# Patient Record
Sex: MALE | Race: BLACK OR AFRICAN AMERICAN | NOT HISPANIC OR LATINO | Employment: FULL TIME | URBAN - METROPOLITAN AREA
[De-identification: names, ages, dates, MRNs, and addresses within clinical notes are randomized per-mention and may not be internally consistent; named-entity substitution may affect disease eponyms.]

---

## 2017-05-22 ENCOUNTER — PATIENT OUTREACH (OUTPATIENT)
Dept: ADMINISTRATIVE | Facility: HOSPITAL | Age: 52
End: 2017-05-22

## 2017-05-22 NOTE — PROGRESS NOTES
Called both numbers listed for patient and cell phone does not have voice mail set up.  The second phone number rang and rang did not go to voice mail.    Sent a letter per mail for patient to call back and schedule appointment for labs, office visit, and update health maintenance.

## 2017-08-31 ENCOUNTER — PATIENT MESSAGE (OUTPATIENT)
Dept: FAMILY MEDICINE | Facility: CLINIC | Age: 52
End: 2017-08-31

## 2017-09-09 ENCOUNTER — LAB VISIT (OUTPATIENT)
Dept: LAB | Facility: HOSPITAL | Age: 52
End: 2017-09-09
Payer: COMMERCIAL

## 2017-09-09 LAB
ESTIMATED AVG GLUCOSE: 226 MG/DL
HBA1C MFR BLD HPLC: 9.5 %

## 2017-09-09 PROCEDURE — 83036 HEMOGLOBIN GLYCOSYLATED A1C: CPT

## 2017-09-09 PROCEDURE — 36415 COLL VENOUS BLD VENIPUNCTURE: CPT | Mod: PO

## 2017-09-11 ENCOUNTER — PATIENT MESSAGE (OUTPATIENT)
Dept: FAMILY MEDICINE | Facility: CLINIC | Age: 52
End: 2017-09-11

## 2017-09-18 DIAGNOSIS — N52.9 ERECTILE DYSFUNCTION, UNSPECIFIED ERECTILE DYSFUNCTION TYPE: Primary | ICD-10-CM

## 2017-09-18 RX ORDER — VARDENAFIL HYDROCHLORIDE 20 MG/1
TABLET ORAL
Qty: 10 TABLET | Refills: 12 | Status: SHIPPED | OUTPATIENT
Start: 2017-09-18 | End: 2021-02-05

## 2017-09-18 RX ORDER — METFORMIN HYDROCHLORIDE 1000 MG/1
TABLET ORAL
Qty: 60 TABLET | Refills: 5 | Status: SHIPPED | OUTPATIENT
Start: 2017-09-18 | End: 2018-08-17 | Stop reason: SDUPTHER

## 2017-10-20 ENCOUNTER — TELEPHONE (OUTPATIENT)
Dept: ENDOCRINOLOGY | Facility: CLINIC | Age: 52
End: 2017-10-20

## 2017-12-12 ENCOUNTER — TELEPHONE (OUTPATIENT)
Dept: ENDOCRINOLOGY | Facility: CLINIC | Age: 52
End: 2017-12-12

## 2018-07-30 ENCOUNTER — PATIENT MESSAGE (OUTPATIENT)
Dept: FAMILY MEDICINE | Facility: CLINIC | Age: 53
End: 2018-07-30

## 2018-07-30 DIAGNOSIS — Z00.00 ROUTINE MEDICAL EXAM: Primary | ICD-10-CM

## 2018-07-30 NOTE — TELEPHONE ENCOUNTER
very overdue for labs, definitely needs to get labs done before the appointment or reschedule the appointment.      Labs will be fasting and urine needed also

## 2018-08-02 ENCOUNTER — LAB VISIT (OUTPATIENT)
Dept: LAB | Facility: HOSPITAL | Age: 53
End: 2018-08-02
Attending: INTERNAL MEDICINE
Payer: COMMERCIAL

## 2018-08-02 ENCOUNTER — PATIENT MESSAGE (OUTPATIENT)
Dept: FAMILY MEDICINE | Facility: CLINIC | Age: 53
End: 2018-08-02

## 2018-08-02 DIAGNOSIS — Z00.00 ROUTINE MEDICAL EXAM: ICD-10-CM

## 2018-08-02 LAB
ALBUMIN SERPL BCP-MCNC: 3.9 G/DL
ALP SERPL-CCNC: 66 U/L
ALT SERPL W/O P-5'-P-CCNC: 29 U/L
ANION GAP SERPL CALC-SCNC: 7 MMOL/L
AST SERPL-CCNC: 24 U/L
BASOPHILS # BLD AUTO: 0.03 K/UL
BASOPHILS NFR BLD: 0.5 %
BILIRUB SERPL-MCNC: 1.3 MG/DL
BUN SERPL-MCNC: 11 MG/DL
CALCIUM SERPL-MCNC: 9.8 MG/DL
CHLORIDE SERPL-SCNC: 98 MMOL/L
CHOLEST SERPL-MCNC: 197 MG/DL
CHOLEST/HDLC SERPL: 5.1 {RATIO}
CO2 SERPL-SCNC: 26 MMOL/L
COMPLEXED PSA SERPL-MCNC: 1.8 NG/ML
CREAT SERPL-MCNC: 1.2 MG/DL
DIFFERENTIAL METHOD: NORMAL
EOSINOPHIL # BLD AUTO: 0.1 K/UL
EOSINOPHIL NFR BLD: 2.1 %
ERYTHROCYTE [DISTWIDTH] IN BLOOD BY AUTOMATED COUNT: 12.2 %
EST. GFR  (AFRICAN AMERICAN): >60 ML/MIN/1.73 M^2
EST. GFR  (NON AFRICAN AMERICAN): >60 ML/MIN/1.73 M^2
ESTIMATED AVG GLUCOSE: 255 MG/DL
GLUCOSE SERPL-MCNC: 206 MG/DL
HBA1C MFR BLD HPLC: 10.5 %
HCT VFR BLD AUTO: 46.3 %
HDLC SERPL-MCNC: 39 MG/DL
HDLC SERPL: 19.8 %
HGB BLD-MCNC: 15.7 G/DL
IMM GRANULOCYTES # BLD AUTO: 0.03 K/UL
IMM GRANULOCYTES NFR BLD AUTO: 0.5 %
LDLC SERPL CALC-MCNC: 125.8 MG/DL
LYMPHOCYTES # BLD AUTO: 1.7 K/UL
LYMPHOCYTES NFR BLD: 29.3 %
MCH RBC QN AUTO: 30.6 PG
MCHC RBC AUTO-ENTMCNC: 33.9 G/DL
MCV RBC AUTO: 90 FL
MONOCYTES # BLD AUTO: 0.7 K/UL
MONOCYTES NFR BLD: 12.8 %
NEUTROPHILS # BLD AUTO: 3.1 K/UL
NEUTROPHILS NFR BLD: 54.8 %
NONHDLC SERPL-MCNC: 158 MG/DL
NRBC BLD-RTO: 0 /100 WBC
PLATELET # BLD AUTO: 209 K/UL
PMV BLD AUTO: 10 FL
POTASSIUM SERPL-SCNC: 4.9 MMOL/L
PROT SERPL-MCNC: 7.3 G/DL
RBC # BLD AUTO: 5.13 M/UL
SODIUM SERPL-SCNC: 131 MMOL/L
TRIGL SERPL-MCNC: 161 MG/DL
TSH SERPL DL<=0.005 MIU/L-ACNC: 1.07 UIU/ML
WBC # BLD AUTO: 5.63 K/UL

## 2018-08-02 PROCEDURE — 85025 COMPLETE CBC W/AUTO DIFF WBC: CPT

## 2018-08-02 PROCEDURE — 84443 ASSAY THYROID STIM HORMONE: CPT

## 2018-08-02 PROCEDURE — 84153 ASSAY OF PSA TOTAL: CPT

## 2018-08-02 PROCEDURE — 83036 HEMOGLOBIN GLYCOSYLATED A1C: CPT

## 2018-08-02 PROCEDURE — 36415 COLL VENOUS BLD VENIPUNCTURE: CPT | Mod: PO

## 2018-08-02 PROCEDURE — 80053 COMPREHEN METABOLIC PANEL: CPT

## 2018-08-02 PROCEDURE — 80061 LIPID PANEL: CPT

## 2018-08-17 ENCOUNTER — OFFICE VISIT (OUTPATIENT)
Dept: FAMILY MEDICINE | Facility: CLINIC | Age: 53
End: 2018-08-17
Payer: COMMERCIAL

## 2018-08-17 VITALS
WEIGHT: 240.75 LBS | OXYGEN SATURATION: 99 % | HEIGHT: 74 IN | SYSTOLIC BLOOD PRESSURE: 130 MMHG | BODY MASS INDEX: 30.9 KG/M2 | HEART RATE: 85 BPM | DIASTOLIC BLOOD PRESSURE: 88 MMHG | TEMPERATURE: 98 F

## 2018-08-17 DIAGNOSIS — Z12.5 SCREENING FOR PROSTATE CANCER: ICD-10-CM

## 2018-08-17 DIAGNOSIS — Z86.010 HISTORY OF COLONIC POLYPS: ICD-10-CM

## 2018-08-17 DIAGNOSIS — E78.5 HYPERLIPIDEMIA, UNSPECIFIED HYPERLIPIDEMIA TYPE: ICD-10-CM

## 2018-08-17 DIAGNOSIS — Z91.199 POOR COMPLIANCE: ICD-10-CM

## 2018-08-17 DIAGNOSIS — Z00.00 ROUTINE MEDICAL EXAM: Primary | ICD-10-CM

## 2018-08-17 DIAGNOSIS — E78.6 HDL LIPOPROTEIN DEFICIENCY: ICD-10-CM

## 2018-08-17 PROBLEM — Z86.0100 HISTORY OF COLONIC POLYPS: Status: ACTIVE | Noted: 2018-08-17

## 2018-08-17 PROCEDURE — 99396 PREV VISIT EST AGE 40-64: CPT | Mod: S$GLB,,, | Performed by: INTERNAL MEDICINE

## 2018-08-17 PROCEDURE — 99999 PR PBB SHADOW E&M-EST. PATIENT-LVL IV: CPT | Mod: PBBFAC,,, | Performed by: INTERNAL MEDICINE

## 2018-08-17 PROCEDURE — 3046F HEMOGLOBIN A1C LEVEL >9.0%: CPT | Mod: CPTII,S$GLB,, | Performed by: INTERNAL MEDICINE

## 2018-08-17 RX ORDER — GLIMEPIRIDE 4 MG/1
4 TABLET ORAL 2 TIMES DAILY
Qty: 60 TABLET | Refills: 3 | Status: SHIPPED | OUTPATIENT
Start: 2018-08-17 | End: 2019-08-19 | Stop reason: SDUPTHER

## 2018-08-17 RX ORDER — METFORMIN HYDROCHLORIDE 1000 MG/1
TABLET ORAL
Qty: 60 TABLET | Refills: 3 | Status: SHIPPED | OUTPATIENT
Start: 2018-08-17 | End: 2019-06-27 | Stop reason: SDUPTHER

## 2018-08-17 RX ORDER — PRAVASTATIN SODIUM 40 MG/1
40 TABLET ORAL DAILY
Qty: 90 TABLET | Refills: 6 | Status: SHIPPED | OUTPATIENT
Start: 2018-08-17 | End: 2019-06-27 | Stop reason: SDUPTHER

## 2018-08-17 NOTE — PROGRESS NOTES
Chief complaint: Follow-up on lab are    52-year-old black male . Last seen 2016.  PSA normal. MULTIPLE and large polyps 8/16 -3 yrs      poorly compliant with follow-up.  He is actually not on Amaryl and Pravachol.  Does not appear to have a triglyceride problem so we will not restart the again febrile.  We will restart his Amaryl the discussion of potential low sugars.  He has already seen the dietitian and does not feel he needs a refresher.  He does state today he is often he is going to get a the big frozen drink and plan out to him he really needs to check some postprandial is a make some significant adjustments.  We discussed at great length all the complications of uncontrolled diabetes including blindness, kidney damage, vascular disease.  We will set him up with Optometry.  Discussed the possible need for insulin another more expensive medicines.  Encouraged him I gave him written instruction to follow up in mid November and only gave him 3 months refills    ROS:   CONST: weight stable. EYES: no vision change. ENT: no sore throat. CV: no chest pain w/ exertion. RESP: no shortness of breath. GI: no nausea, vomiting, diarrhea. No dysphagia. : no urinary issues. MUSCULOSKELETAL: no new myalgias or arthralgias. SKIN: no new changes. NEURO: no focal deficits. PSYCH: no new issues. ENDOCRINE: no polyuria. HEME: no lymph nodes. ALLERGY: no general pruritis.      PAST MEDICAL HISTORY:                                                        1.  Diabetes.    Uncontrolled  , Possible neuropathy                                                          2.  Hyperlipidemia.  Lopid added 5/12  , Pravachol added 11/12                                                      3.  Low HDL.    4.  Erectile dysfunction   5.  Poor compliance   6.  epicondylitis     7.  Hoarseness -seen by ENT- did inhaler     8.  Superficial thrombosis and varicose veins right lower leg, seeing vascular       9.  MULTIPLE and large polyps 8/16 -3  "yrs                                                                                                                               PAST SURGICAL HISTORY:  None.                                                                                                                             FAMILY HISTORY:  Father doing well with history of colon polyps.  Mom is      with history of breast cancer.  Three brothers and two sisters      with no known medical problems.                                                                                                                           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  Quit smoking in  and only had a brief      period of smoking.  He reports "moderate" amount of alcohol with no          frequency stated.     Few beers now and then     Gen: no distress  EYES: conjunctiva clear, non-icteric, PERRL  ENT: nose clear, nasal mucosa normal, oropharynx clear and moist, teeth good  NECK:supple, thyroid non-palpable  RESP: effort is good, lungs clear  CV: heart RRR w/o murmur, gallops or rubs; no carotid bruits, no edema  GI: abdomen soft, non-distended, non-tender, no hepatosplenomegaly  MS: gait normal, no clubbing or cyanosis of the digits  SKIN: no rashes, warm to touch    Labs reviewed    Abel was seen today for diabetes.    Diagnoses and all orders for this visit:    Routine medical exam, up-to-date on appropriate health maintenance    Screening for prostate cancer    Diabetes mellitus with neurological manifestations, uncontrolled, discussed as above  -     glimepiride (AMARYL) 4 MG tablet; Take 1 tablet (4 mg total) by mouth 2 (two) times daily.  -     metFORMIN (GLUCOPHAGE) 1000 MG tablet; 1 Tablet Oral Twice a day  -     Ambulatory referral to Optometry    HDL lipoprotein deficiency    Hyperlipidemia, unspecified hyperlipidemia type, restart pravastatin    Poor compliance    History of " "colonic polyps, discussed the significance of his large polyps    Other orders  -     Cancel: Ambulatory referral to Optometry  -     Cancel: Ambulatory referral to Podiatry  -     pravastatin (PRAVACHOL) 40 MG tablet; Take 1 tablet (40 mg total) by mouth once daily.     "This note will not be shared with the patient."  "

## 2018-11-28 LAB
LEFT EYE DM RETINOPATHY: NEGATIVE
RIGHT EYE DM RETINOPATHY: NEGATIVE

## 2019-02-15 DIAGNOSIS — E11.9 TYPE 2 DIABETES MELLITUS WITHOUT COMPLICATION: ICD-10-CM

## 2019-06-09 ENCOUNTER — PATIENT MESSAGE (OUTPATIENT)
Dept: FAMILY MEDICINE | Facility: CLINIC | Age: 54
End: 2019-06-09

## 2019-06-11 ENCOUNTER — PATIENT MESSAGE (OUTPATIENT)
Dept: FAMILY MEDICINE | Facility: CLINIC | Age: 54
End: 2019-06-11

## 2019-06-11 DIAGNOSIS — E11.65 UNCONTROLLED TYPE 2 DIABETES MELLITUS WITH HYPERGLYCEMIA: Primary | ICD-10-CM

## 2019-06-12 ENCOUNTER — PATIENT MESSAGE (OUTPATIENT)
Dept: FAMILY MEDICINE | Facility: CLINIC | Age: 54
End: 2019-06-12

## 2019-06-13 ENCOUNTER — LAB VISIT (OUTPATIENT)
Dept: LAB | Facility: HOSPITAL | Age: 54
End: 2019-06-13
Attending: INTERNAL MEDICINE
Payer: COMMERCIAL

## 2019-06-13 DIAGNOSIS — E11.9 TYPE 2 DIABETES MELLITUS WITHOUT COMPLICATION: ICD-10-CM

## 2019-06-13 DIAGNOSIS — E11.65 UNCONTROLLED TYPE 2 DIABETES MELLITUS WITH HYPERGLYCEMIA: ICD-10-CM

## 2019-06-13 LAB
ALBUMIN SERPL BCP-MCNC: 3.8 G/DL (ref 3.5–5.2)
ALP SERPL-CCNC: 65 U/L (ref 55–135)
ALT SERPL W/O P-5'-P-CCNC: 34 U/L (ref 10–44)
ANION GAP SERPL CALC-SCNC: 11 MMOL/L (ref 8–16)
AST SERPL-CCNC: 29 U/L (ref 10–40)
BILIRUB SERPL-MCNC: 0.8 MG/DL (ref 0.1–1)
BUN SERPL-MCNC: 7 MG/DL (ref 6–20)
CALCIUM SERPL-MCNC: 9.4 MG/DL (ref 8.7–10.5)
CHLORIDE SERPL-SCNC: 101 MMOL/L (ref 95–110)
CHOLEST SERPL-MCNC: 212 MG/DL (ref 120–199)
CHOLEST/HDLC SERPL: 4.9 {RATIO} (ref 2–5)
CO2 SERPL-SCNC: 21 MMOL/L (ref 23–29)
CREAT SERPL-MCNC: 1.1 MG/DL (ref 0.5–1.4)
ERYTHROCYTE [SEDIMENTATION RATE] IN BLOOD BY WESTERGREN METHOD: <2 MM/HR (ref 0–23)
EST. GFR  (AFRICAN AMERICAN): >60 ML/MIN/1.73 M^2
EST. GFR  (NON AFRICAN AMERICAN): >60 ML/MIN/1.73 M^2
ESTIMATED AVG GLUCOSE: 263 MG/DL (ref 68–131)
ESTIMATED AVG GLUCOSE: 263 MG/DL (ref 68–131)
GLUCOSE SERPL-MCNC: 223 MG/DL (ref 70–110)
HBA1C MFR BLD HPLC: 10.8 % (ref 4–5.6)
HBA1C MFR BLD HPLC: 10.8 % (ref 4–5.6)
HDLC SERPL-MCNC: 43 MG/DL (ref 40–75)
HDLC SERPL: 20.3 % (ref 20–50)
LDLC SERPL CALC-MCNC: 136.6 MG/DL (ref 63–159)
NONHDLC SERPL-MCNC: 169 MG/DL
POTASSIUM SERPL-SCNC: 3.9 MMOL/L (ref 3.5–5.1)
PROT SERPL-MCNC: 6.9 G/DL (ref 6–8.4)
SODIUM SERPL-SCNC: 133 MMOL/L (ref 136–145)
TRIGL SERPL-MCNC: 162 MG/DL (ref 30–150)

## 2019-06-13 PROCEDURE — 36415 COLL VENOUS BLD VENIPUNCTURE: CPT | Mod: PO

## 2019-06-13 PROCEDURE — 80053 COMPREHEN METABOLIC PANEL: CPT

## 2019-06-13 PROCEDURE — 80061 LIPID PANEL: CPT

## 2019-06-13 PROCEDURE — 85652 RBC SED RATE AUTOMATED: CPT

## 2019-06-13 PROCEDURE — 83036 HEMOGLOBIN GLYCOSYLATED A1C: CPT

## 2019-06-27 RX ORDER — PRAVASTATIN SODIUM 40 MG/1
40 TABLET ORAL DAILY
Qty: 90 TABLET | Refills: 12 | Status: SHIPPED | OUTPATIENT
Start: 2019-06-27 | End: 2021-02-05 | Stop reason: SDUPTHER

## 2019-06-27 RX ORDER — METFORMIN HYDROCHLORIDE 1000 MG/1
TABLET ORAL
Qty: 180 TABLET | Refills: 12 | Status: SHIPPED | OUTPATIENT
Start: 2019-06-27 | End: 2020-12-08

## 2019-07-15 ENCOUNTER — TELEPHONE (OUTPATIENT)
Dept: FAMILY MEDICINE | Facility: CLINIC | Age: 54
End: 2019-07-15

## 2019-07-30 ENCOUNTER — PATIENT MESSAGE (OUTPATIENT)
Dept: FAMILY MEDICINE | Facility: CLINIC | Age: 54
End: 2019-07-30

## 2019-07-30 DIAGNOSIS — M79.605 LEFT LEG PAIN: Primary | ICD-10-CM

## 2019-07-30 DIAGNOSIS — R29.898 BILATERAL LEG WEAKNESS: Primary | ICD-10-CM

## 2019-07-31 ENCOUNTER — PATIENT MESSAGE (OUTPATIENT)
Dept: FAMILY MEDICINE | Facility: CLINIC | Age: 54
End: 2019-07-31

## 2019-07-31 ENCOUNTER — PATIENT OUTREACH (OUTPATIENT)
Dept: ADMINISTRATIVE | Facility: HOSPITAL | Age: 54
End: 2019-07-31

## 2019-07-31 DIAGNOSIS — Z12.11 SCREEN FOR COLON CANCER: Primary | ICD-10-CM

## 2019-08-01 ENCOUNTER — PATIENT MESSAGE (OUTPATIENT)
Dept: FAMILY MEDICINE | Facility: CLINIC | Age: 54
End: 2019-08-01

## 2019-08-01 DIAGNOSIS — Z12.11 COLON CANCER SCREENING: Primary | ICD-10-CM

## 2019-08-01 NOTE — TELEPHONE ENCOUNTER
I do not see any documentation that any nurse told him that the form would be done today.  Just to note, physicians are not given time during the day to do forms and that is why the policy is patients need to have an appointment to complete forms and I will relate this to the patient, thanks

## 2019-08-01 NOTE — TELEPHONE ENCOUNTER
Spoke with pt states he is checking on status of paperwork he faxed over last night, states he spoke with nurse earlier and was told it would be finished today... Please advise

## 2019-08-06 ENCOUNTER — TELEPHONE (OUTPATIENT)
Dept: ADMINISTRATIVE | Facility: HOSPITAL | Age: 54
End: 2019-08-06

## 2019-08-12 ENCOUNTER — OFFICE VISIT (OUTPATIENT)
Dept: ORTHOPEDICS | Facility: CLINIC | Age: 54
End: 2019-08-12
Payer: COMMERCIAL

## 2019-08-12 VITALS
BODY MASS INDEX: 30.62 KG/M2 | HEART RATE: 104 BPM | DIASTOLIC BLOOD PRESSURE: 74 MMHG | WEIGHT: 238.56 LBS | HEIGHT: 74 IN | SYSTOLIC BLOOD PRESSURE: 120 MMHG

## 2019-08-12 DIAGNOSIS — E11.42 DIABETIC POLYNEUROPATHY ASSOCIATED WITH TYPE 2 DIABETES MELLITUS: Primary | ICD-10-CM

## 2019-08-12 DIAGNOSIS — M79.604 LEG PAIN, BILATERAL: Primary | ICD-10-CM

## 2019-08-12 DIAGNOSIS — M79.605 LEG PAIN, BILATERAL: Primary | ICD-10-CM

## 2019-08-12 PROCEDURE — 99999 PR PBB SHADOW E&M-EST. PATIENT-LVL III: ICD-10-PCS | Mod: PBBFAC,,, | Performed by: ORTHOPAEDIC SURGERY

## 2019-08-12 PROCEDURE — 3046F HEMOGLOBIN A1C LEVEL >9.0%: CPT | Mod: CPTII,S$GLB,, | Performed by: ORTHOPAEDIC SURGERY

## 2019-08-12 PROCEDURE — 99203 OFFICE O/P NEW LOW 30 MIN: CPT | Mod: S$GLB,,, | Performed by: ORTHOPAEDIC SURGERY

## 2019-08-12 PROCEDURE — 3008F PR BODY MASS INDEX (BMI) DOCUMENTED: ICD-10-PCS | Mod: CPTII,S$GLB,, | Performed by: ORTHOPAEDIC SURGERY

## 2019-08-12 PROCEDURE — 3046F PR MOST RECENT HEMOGLOBIN A1C LEVEL > 9.0%: ICD-10-PCS | Mod: CPTII,S$GLB,, | Performed by: ORTHOPAEDIC SURGERY

## 2019-08-12 PROCEDURE — 3008F BODY MASS INDEX DOCD: CPT | Mod: CPTII,S$GLB,, | Performed by: ORTHOPAEDIC SURGERY

## 2019-08-12 PROCEDURE — 99999 PR PBB SHADOW E&M-EST. PATIENT-LVL III: CPT | Mod: PBBFAC,,, | Performed by: ORTHOPAEDIC SURGERY

## 2019-08-12 PROCEDURE — 99203 PR OFFICE/OUTPT VISIT, NEW, LEVL III, 30-44 MIN: ICD-10-PCS | Mod: S$GLB,,, | Performed by: ORTHOPAEDIC SURGERY

## 2019-08-12 RX ORDER — GABAPENTIN 300 MG/1
300 CAPSULE ORAL 3 TIMES DAILY
Qty: 90 CAPSULE | Refills: 11 | Status: SHIPPED | OUTPATIENT
Start: 2019-08-12 | End: 2023-03-24

## 2019-08-12 NOTE — PROGRESS NOTES
"Chief Complaint   Patient presents with    Right Lower Leg - Pain    Left Lower Leg - Pain     This patient was seen in consultation at the request of Dr. Sae Stakrey     HPI: Abel Morris is a 53 y.o. male who presents today complaining of bilateral leg pain       Duration of symptoms:  6 months  Trauma or new activity: no  Pain is constant  Has a very difficult time relaying his symptoms and responds that he is "unsure" or "it is hard to describe" in response to many questions  Occasional back pain   No numbness or paresthesias  Symptoms are not consistent with radiculopathy or myelopathy  Legs "feel tight" - describes this sensation as the feeling of a vacuum sealed back   Feels that is difficult to walk after he has been on his feet for a while -- feels uncoordinated, weak, painful   Pain localized from mid shin (anterior and posterior), radiates down to the plantar aspect of bilateral feet.     This is the extent of the patient's complaints at this time.      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    ROS      Review of patient's allergies indicates:  No Known Allergies      Current Outpatient Medications:     GEMFIBROZIL ORAL, , Disp: , Rfl:     glimepiride (AMARYL) 4 MG tablet, Take 1 tablet (4 mg total) by mouth 2 (two) times daily., Disp: 60 tablet, Rfl: 3    metFORMIN (GLUCOPHAGE) 1000 MG tablet, 1 Tablet Oral Twice a day, Disp: 180 tablet, Rfl: 12    sildenafil (REVATIO) 20 mg Tab, 1-5 pills at a time per day prn, Disp: 30 tablet, Rfl: 11    vardenafil (LEVITRA) 20 MG tablet, Half to one qd prn, Disp: 10 tablet, Rfl: 12    pravastatin (PRAVACHOL) 40 MG tablet, Take 1 tablet (40 mg total) by mouth once daily., Disp: 90 tablet, Rfl: 12    Past Medical History:   Diagnosis Date    Arthritis     Diabetes mellitus type II, uncontrolled 11/28/2012    Diabetes mellitus with neurological manifestations, uncontrolled 6/13/2014    ED (erectile " dysfunction) 11/28/2012    Eye injury     os hit broken orbital bone     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    History of colonic polyps 8/17/2018    Hyperlipidemia 11/28/2012    Poor compliance 1/8/2014       Patient Active Problem List   Diagnosis    ED (erectile dysfunction)    Hyperlipidemia    Diabetes mellitus type II, uncontrolled    Poor compliance    HDL lipoprotein deficiency    Medial epicondylitis    Diabetes mellitus with neurological manifestations, uncontrolled    Overweight (BMI 25.0-29.9)    Varicose veins of lower extremities with inflammation    Blood in stool    History of colonic polyps       Past Surgical History:   Procedure Laterality Date    COLONOSCOPY N/A 8/2/2016    Per[facility] Miguel [facility]aud [facility] END[facility]       Social History     Tobacco Use    Smoking status: Never Smoker    Smokeless tobacco: Never Used   Substance Use Topics    Alcohol use: Yes     Alcohol/week: 3.6 oz     Types: 6 Cans of beer per week     Frequency: 4 or more times a week     Drinks per session: 10 or more     Binge frequency: Daily or almost daily     Comment: per week    Drug use: No       Family History   Problem Relation Age of Onset    No Known Problems Mother     Colon polyps Father 61    No Known Problems Sister     No Known Problems Brother     No Known Problems Maternal Aunt     No Known Problems Maternal Uncle     No Known Problems Paternal Aunt     No Known Problems Paternal Uncle     No Known Problems Maternal Grandmother     No Known Problems Maternal Grandfather     No Known Problems Paternal Grandmother     No Known Problems Paternal Grandfather     Amblyopia Neg Hx     Blindness Neg Hx     Cancer Neg Hx     Cataracts Neg Hx     Diabetes Neg Hx     Glaucoma Neg Hx     Hypertension Neg Hx     Macular degeneration Neg Hx     Retinal detachment Neg Hx     Strabismus Neg Hx     Stroke Neg Hx     Thyroid disease Neg Hx      Physical Exam:     Vitals:     19 0818   BP: 120/74   Pulse: 104      General: Weight: 108.2 kg (238 lb 8.6 oz) Body mass index is 30.63 kg/m².  Patient is alert, awake and oriented to time, place and person. Mood and affect are appropriate.  Patient does not appear to be in any distress, denies any constitutional symptoms and appears stated age.   HEENT: Pupils are equal and round, sclera are not injected. External examination of ears and nose reveals no abnormalities. Cranial nerves II-X are grossly intact  Skin: no rashes, abrasions or open wounds on the affected extremity   Resp: No respiratory distress or audible wheezing   CV: 2+  pulses, all extremities warm and well perfused   Bilateral Lower Extremity    5/5 L3-S1  Diminished sensation throughout bilateral feet in stockingfoot distribution  Cannot detect 5.07 monofilament over MT heads, great toe bilaterally  Dry skin over heel, plantar foot consistent in appearance with DM neuropathy  No change in sensation in leg, thigh  Non tender over leg, foot, ankle  Calf is not swollen not tender  Symmetric reflexes   No clonus  No babinski   2+ DP, BCR all toes     Imagin views bilateral tibia fibula negative     A1c greater than 9 for the last 3 years.  10.2 in 2019 and 2018    Assessment: 53 y.o. male with diabetic neuropathy of bilateral feet    I explained my diagnostic impression and the reasoning behind it in detail, using layman's terms.  Models and/or pictures were used to help in the explanation.    Plan:   - Gabapentin 300 mg TID, discussed weekly titration from QD --> BID --> TID. Wrote 1 month, can be refilled by Dr. Starkey if this is something that he would like to continue  - Referral to podiatry for diabetic foot education, exams  - Explained importance of getting and keeping DM Under control.  Discussed risks of infection, osteomyelitis and amputation.   - Nightly foot checks  - Return to clinic as needed if symptoms worsen or fail to improve.    All  questions were answered in detail. The patient is in full agreement with the treatment plan and will proceed accordingly.    A note notifying Dr. Sae Starkey of my findings was sent via the electronic medical record     This note was created by combination of typed  and M-Modal dictation. Transcription and phonetic errors may be present.  If there are any questions, please contact me.

## 2019-08-12 NOTE — LETTER
August 12, 2019      Sae Starkey MD  [address] [place] [address]           [facility]  [address], [address]  [place] [address]  Phone: [phone]          Patient: Abel Morris   MR Number: [id number]   YOB: 1965   Date of Visit: 8/12/2019       Dear Dr. Sae Starkey:    Thank you for referring Abel Morris to me for evaluation. Attached you will find relevant portions of my assessment and plan of care.    If you have questions, please do not hesitate to call me. I look forward to following Abel Morris along with you.    Sincerely,    Pam Cordero MD    Enclosure  CC:  No Recipients    If you would like to receive this communication electronically, please contact [email or web address] or [phone] to request more information on [facility] Link access.    For providers and/or their staff who would like to refer a patient to [facility], please contact us through our one-stop-shop provider referral line, [facility], at [phone].    If you feel you have received this communication in error or would no longer like to receive these types of communications, please e-mail [email or web address]

## 2019-08-14 ENCOUNTER — OFFICE VISIT (OUTPATIENT)
Dept: FAMILY MEDICINE | Facility: CLINIC | Age: 54
End: 2019-08-14
Payer: COMMERCIAL

## 2019-08-14 ENCOUNTER — LAB VISIT (OUTPATIENT)
Dept: LAB | Facility: HOSPITAL | Age: 54
End: 2019-08-14
Attending: INTERNAL MEDICINE
Payer: COMMERCIAL

## 2019-08-14 VITALS
BODY MASS INDEX: 30.33 KG/M2 | HEART RATE: 100 BPM | WEIGHT: 236.31 LBS | HEIGHT: 74 IN | OXYGEN SATURATION: 99 % | SYSTOLIC BLOOD PRESSURE: 123 MMHG | TEMPERATURE: 100 F | DIASTOLIC BLOOD PRESSURE: 80 MMHG

## 2019-08-14 DIAGNOSIS — E11.65 UNCONTROLLED TYPE 2 DIABETES MELLITUS WITH HYPERGLYCEMIA: ICD-10-CM

## 2019-08-14 DIAGNOSIS — Z86.010 HISTORY OF COLONIC POLYPS: ICD-10-CM

## 2019-08-14 DIAGNOSIS — E11.42 DIABETIC POLYNEUROPATHY ASSOCIATED WITH TYPE 2 DIABETES MELLITUS: ICD-10-CM

## 2019-08-14 DIAGNOSIS — Z91.199 POOR COMPLIANCE: ICD-10-CM

## 2019-08-14 DIAGNOSIS — E78.5 HYPERLIPIDEMIA, UNSPECIFIED HYPERLIPIDEMIA TYPE: ICD-10-CM

## 2019-08-14 DIAGNOSIS — E11.42 DIABETIC POLYNEUROPATHY ASSOCIATED WITH TYPE 2 DIABETES MELLITUS: Primary | ICD-10-CM

## 2019-08-14 DIAGNOSIS — R29.898 BILATERAL LEG WEAKNESS: ICD-10-CM

## 2019-08-14 LAB
ALBUMIN SERPL BCP-MCNC: 4.3 G/DL (ref 3.5–5.2)
ALP SERPL-CCNC: 68 U/L (ref 55–135)
ALT SERPL W/O P-5'-P-CCNC: 24 U/L (ref 10–44)
ANION GAP SERPL CALC-SCNC: 12 MMOL/L (ref 8–16)
AST SERPL-CCNC: 17 U/L (ref 10–40)
BASOPHILS # BLD AUTO: 0.03 K/UL (ref 0–0.2)
BASOPHILS NFR BLD: 0.7 % (ref 0–1.9)
BILIRUB SERPL-MCNC: 0.4 MG/DL (ref 0.1–1)
BUN SERPL-MCNC: 15 MG/DL (ref 6–20)
CALCIUM SERPL-MCNC: 10.1 MG/DL (ref 8.7–10.5)
CHLORIDE SERPL-SCNC: 98 MMOL/L (ref 95–110)
CO2 SERPL-SCNC: 24 MMOL/L (ref 23–29)
COMPLEXED PSA SERPL-MCNC: 1.5 NG/ML (ref 0–4)
CREAT SERPL-MCNC: 1.4 MG/DL (ref 0.5–1.4)
DIFFERENTIAL METHOD: ABNORMAL
EOSINOPHIL # BLD AUTO: 0 K/UL (ref 0–0.5)
EOSINOPHIL NFR BLD: 0.9 % (ref 0–8)
ERYTHROCYTE [DISTWIDTH] IN BLOOD BY AUTOMATED COUNT: 11.8 % (ref 11.5–14.5)
ERYTHROCYTE [SEDIMENTATION RATE] IN BLOOD BY WESTERGREN METHOD: 8 MM/HR (ref 0–23)
EST. GFR  (AFRICAN AMERICAN): >60 ML/MIN/1.73 M^2
EST. GFR  (NON AFRICAN AMERICAN): 57.4 ML/MIN/1.73 M^2
ESTIMATED AVG GLUCOSE: 217 MG/DL (ref 68–131)
GLUCOSE SERPL-MCNC: 447 MG/DL (ref 70–110)
HBA1C MFR BLD HPLC: 9.2 % (ref 4–5.6)
HCT VFR BLD AUTO: 45.5 % (ref 40–54)
HGB BLD-MCNC: 16.5 G/DL (ref 14–18)
IMM GRANULOCYTES # BLD AUTO: 0.03 K/UL (ref 0–0.04)
IMM GRANULOCYTES NFR BLD AUTO: 0.7 % (ref 0–0.5)
LYMPHOCYTES # BLD AUTO: 1.3 K/UL (ref 1–4.8)
LYMPHOCYTES NFR BLD: 28.7 % (ref 18–48)
MCH RBC QN AUTO: 31.5 PG (ref 27–31)
MCHC RBC AUTO-ENTMCNC: 36.3 G/DL (ref 32–36)
MCV RBC AUTO: 87 FL (ref 82–98)
MONOCYTES # BLD AUTO: 0.4 K/UL (ref 0.3–1)
MONOCYTES NFR BLD: 9.7 % (ref 4–15)
NEUTROPHILS # BLD AUTO: 2.6 K/UL (ref 1.8–7.7)
NEUTROPHILS NFR BLD: 59.3 % (ref 38–73)
NRBC BLD-RTO: 0 /100 WBC
PLATELET # BLD AUTO: 213 K/UL (ref 150–350)
PMV BLD AUTO: 11.4 FL (ref 9.2–12.9)
POTASSIUM SERPL-SCNC: 4.6 MMOL/L (ref 3.5–5.1)
PROT SERPL-MCNC: 8.1 G/DL (ref 6–8.4)
RBC # BLD AUTO: 5.24 M/UL (ref 4.6–6.2)
SODIUM SERPL-SCNC: 134 MMOL/L (ref 136–145)
T4 FREE SERPL-MCNC: 0.78 NG/DL (ref 0.71–1.51)
TSH SERPL DL<=0.005 MIU/L-ACNC: 0.93 UIU/ML (ref 0.4–4)
VIT B12 SERPL-MCNC: 510 PG/ML (ref 210–950)
WBC # BLD AUTO: 4.35 K/UL (ref 3.9–12.7)

## 2019-08-14 PROCEDURE — 82607 VITAMIN B-12: CPT

## 2019-08-14 PROCEDURE — 83036 HEMOGLOBIN GLYCOSYLATED A1C: CPT

## 2019-08-14 PROCEDURE — 99999 PR PBB SHADOW E&M-EST. PATIENT-LVL IV: ICD-10-PCS | Mod: PBBFAC,,, | Performed by: INTERNAL MEDICINE

## 2019-08-14 PROCEDURE — 99214 OFFICE O/P EST MOD 30 MIN: CPT | Mod: S$GLB,,, | Performed by: INTERNAL MEDICINE

## 2019-08-14 PROCEDURE — 99999 PR PBB SHADOW E&M-EST. PATIENT-LVL IV: CPT | Mod: PBBFAC,,, | Performed by: INTERNAL MEDICINE

## 2019-08-14 PROCEDURE — 84165 PROTEIN E-PHORESIS SERUM: CPT

## 2019-08-14 PROCEDURE — 99214 PR OFFICE/OUTPT VISIT, EST, LEVL IV, 30-39 MIN: ICD-10-PCS | Mod: S$GLB,,, | Performed by: INTERNAL MEDICINE

## 2019-08-14 PROCEDURE — 84165 PATHOLOGIST INTERPRETATION SPE: ICD-10-PCS | Mod: 26,,, | Performed by: PATHOLOGY

## 2019-08-14 PROCEDURE — 3008F PR BODY MASS INDEX (BMI) DOCUMENTED: ICD-10-PCS | Mod: CPTII,S$GLB,, | Performed by: INTERNAL MEDICINE

## 2019-08-14 PROCEDURE — 3046F PR MOST RECENT HEMOGLOBIN A1C LEVEL > 9.0%: ICD-10-PCS | Mod: CPTII,S$GLB,, | Performed by: INTERNAL MEDICINE

## 2019-08-14 PROCEDURE — 84439 ASSAY OF FREE THYROXINE: CPT

## 2019-08-14 PROCEDURE — 36415 COLL VENOUS BLD VENIPUNCTURE: CPT | Mod: PO

## 2019-08-14 PROCEDURE — 84165 PROTEIN E-PHORESIS SERUM: CPT | Mod: 26,,, | Performed by: PATHOLOGY

## 2019-08-14 PROCEDURE — 84443 ASSAY THYROID STIM HORMONE: CPT

## 2019-08-14 PROCEDURE — 80053 COMPREHEN METABOLIC PANEL: CPT

## 2019-08-14 PROCEDURE — 3008F BODY MASS INDEX DOCD: CPT | Mod: CPTII,S$GLB,, | Performed by: INTERNAL MEDICINE

## 2019-08-14 PROCEDURE — 85652 RBC SED RATE AUTOMATED: CPT

## 2019-08-14 PROCEDURE — 85025 COMPLETE CBC W/AUTO DIFF WBC: CPT

## 2019-08-14 PROCEDURE — 84153 ASSAY OF PSA TOTAL: CPT

## 2019-08-14 PROCEDURE — 3046F HEMOGLOBIN A1C LEVEL >9.0%: CPT | Mod: CPTII,S$GLB,, | Performed by: INTERNAL MEDICINE

## 2019-08-14 NOTE — PROGRESS NOTES
Chief complaint:  Bilateral leg weakness    53-year-old black male . Last seen 2016 then 8/18 .  PSA 2018. MULTIPLE and large polyps 8/16 -3 yrs .  Patient essentially seen a year ago.  He is poorly compliant with diabetic follow-up.  He said a message that he was developing bilateral leg weakness and some other unspecified symptoms we communicated back and forth over the computer.  Even the possibility of a spinal problem he was referred to Orthopedics in the interval.  He did have an interval appointment with me that apparently he reschedule due to the weather.  Orthopedic note reviewed and was felt to be secondary to diabetic neuropathy.  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  We discussed the need for him to see Endocrine at this point to get diabetes under control, seen Neurology to have the neuropathy evaluated and confirmed.  We will update all of his blood work.    Long discussion today regarding the probability of it being diabetic neuropathy but I do have an MRI pending that has not been schedule and I will send a message to the referral coordinators to check on that.  He was given gabapentin 300.  He has not yet started because was concerned as it is an anticonvulsant.  We discussed side effects and now that he is off work I would like him to start it.  If it is too strong I can call in 100 mg pill but also discussed he may need to titrate upward for affect.    His lower extremity issues appear to be below the knee and they appear to be a tightness is if the legs are swollen which caused him to walk slowly and feel a little bit more unsteady.  We discussed increased risk for amputations and so forth.  He did have a pedicure lately and could not really feel anything.  We discussed the need to see Podiatry which was ordered by Orthopedics and he should always wear  "shoes, check his feet daily and so forth.  He has no radicular symptoms but do think we need to rule out structural issues in the spine since this is a fairly new symptoms ongoing for the last couple of months and is interfering with his job function.  He may have more paperwork for his job.Total time over 25 minutes with over 50% counseling.      Seen Ortho:  HPI: Abel Morris is a 53 y.o. male who presents today complaining of bilateral leg pain     Duration of symptoms:  6 months  Trauma or new activity: no  Pain is constant  Has a very difficult time relaying his symptoms and responds that he is "unsure" or "it is hard to describe" in response to many questions  Occasional back pain   No numbness or paresthesias  Symptoms are not consistent with radiculopathy or myelopathy  Legs "feel tight" - describes this sensation as the feeling of a vacuum sealed back   Feels that is difficult to walk after he has been on his feet for a while -- feels uncoordinated, weak, painful   Pain localized from mid shin (anterior and posterior), radiates down to the plantar aspect of bilateral feet.           ROS:   CONST: weight stable. EYES: no vision change. ENT: no sore throat. CV: no chest pain w/ exertion. RESP: no shortness of breath. GI: no nausea, vomiting, diarrhea. No dysphagia. : no urinary issues. MUSCULOSKELETAL: no new myalgias or arthralgias. SKIN: no new changes. NEURO: no focal deficits. PSYCH: no new issues. ENDOCRINE: no polyuria. HEME: no lymph nodes. ALLERGY: no general pruritis.      PAST MEDICAL HISTORY:                                                        1.  Diabetes.    Uncontrolled  , Possible neuropathy                                                          2.  Hyperlipidemia.  Lopid added 5/12  , Pravachol added 11/12                                                      3.  Low HDL.    4.  Erectile dysfunction   5.  Poor compliance   6.  epicondylitis     7.  Hoarseness -seen by ENT- did " "inhaler     8.  Superficial thrombosis and varicose veins right lower leg, seeing vascular       9.  MULTIPLE and large polyps /16 -3 yrs                                                                                                                               PAST SURGICAL HISTORY:  None.                                                                                                                             FAMILY HISTORY:  Father doing well with history of colon polyps.  Mom is      with history of breast cancer.  Three brothers and two sisters      with no known medical problems.                                                                                                                           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  Quit smoking in  and only had a brief      period of smoking.  He reports "moderate" amount of alcohol with no          frequency stated.     Few beers now and then     Gen: no distress  Musculoskeletal:  His foot strength appears to be good although plantar flexion appears to be a little weak and it is a little bit difficult for him to go up on his toes and stay up on his toes.  I would expect him to be able to do it a lot longer than just a couple of seconds.  His straight leg testing is negative.  No atrophy or fasciculations.      Labs reviewed    Abel was seen today for follow-up.    Diagnoses and all orders for this visit:    Diabetic polyneuropathy associated with type 2 diabetes mellitus, presumed diabetic neuropathy given the chronically uncontrolled diabetes and now that he symptomatic he does appear to be more inclined to get his diabetes under control.  Discussed that it may only worsened may be a permanent deficit related to diabetes.  We discussed other diabetic complications.  Discussed the need to see endocrinology is he may well be insulin dependent at this time and may need " "to initiate insulin and other medications.  He reports compliance with current medications.  -     Vitamin B12; Future  -     Protein electrophoresis, serum; Future  -     Comprehensive metabolic panel; Future  -     TSH; Future  -     T4, free; Future  -     Sedimentation rate; Future  -     Ambulatory referral to Neurology    Bilateral leg weakness, MRI pending, lab work for other causes of neuropathy but also referred to Neurology and discussed the possibility of needing nerve conduction studies and explained that testing to the patient  -     Ambulatory referral to Neurology    Uncontrolled type 2 diabetes mellitus with hyperglycemia, overdue with poor compliance  -     Vitamin B12; Future  -     Protein electrophoresis, serum; Future  -     Comprehensive metabolic panel; Future  -     Hemoglobin A1c; Future  -     CBC auto differential; Future  -     TSH; Future  -     T4, free; Future  -     PSA, Screening; Future  -     Cancel: Lipid panel; Future  -     Urinalysis; Future  -     Microalbumin/creatinine urine ratio; Future  -     Sedimentation rate; Future  -     Ambulatory referral to Endocrinology    Diabetes mellitus with neurological manifestations, uncontrolled    History of colonic polyps    Hyperlipidemia, unspecified hyperlipidemia type, reports compliance with the statin but not fasting today so will defer    Poor compliance    Clinical obviously not therapeutic for patient to have access given the poor compliance"This note will not be shared with the patient."  "

## 2019-08-15 ENCOUNTER — TELEPHONE (OUTPATIENT)
Dept: FAMILY MEDICINE | Facility: CLINIC | Age: 54
End: 2019-08-15

## 2019-08-15 LAB
ALBUMIN SERPL ELPH-MCNC: 4.77 G/DL (ref 3.35–5.55)
ALPHA1 GLOB SERPL ELPH-MCNC: 0.24 G/DL (ref 0.17–0.41)
ALPHA2 GLOB SERPL ELPH-MCNC: 0.78 G/DL (ref 0.43–0.99)
B-GLOBULIN SERPL ELPH-MCNC: 0.89 G/DL (ref 0.5–1.1)
GAMMA GLOB SERPL ELPH-MCNC: 1.02 G/DL (ref 0.67–1.58)
PROT SERPL-MCNC: 7.7 G/DL (ref 6–8.4)

## 2019-08-15 NOTE — TELEPHONE ENCOUNTER
----- Message from Sae Starkey MD sent at 8/14/2019  8:38 AM CDT -----  Please check on status of lumbar MRI and try to set appt w Neuro and Endo NP, thanks

## 2019-08-15 NOTE — TELEPHONE ENCOUNTER
Patient appointments for Neurology has been scheduled for 8/20/19 and Endocrinology appointment is set for 8/19. MRI order has to be put in STAT to be scheduled as a STAT...Please advise

## 2019-08-16 LAB — PATHOLOGIST INTERPRETATION SPE: NORMAL

## 2019-08-18 ENCOUNTER — PATIENT OUTREACH (OUTPATIENT)
Dept: ADMINISTRATIVE | Facility: OTHER | Age: 54
End: 2019-08-18

## 2019-08-19 ENCOUNTER — OFFICE VISIT (OUTPATIENT)
Dept: ENDOCRINOLOGY | Facility: CLINIC | Age: 54
End: 2019-08-19
Payer: COMMERCIAL

## 2019-08-19 ENCOUNTER — TELEPHONE (OUTPATIENT)
Dept: ENDOCRINOLOGY | Facility: CLINIC | Age: 54
End: 2019-08-19

## 2019-08-19 VITALS
BODY MASS INDEX: 29.71 KG/M2 | HEART RATE: 105 BPM | DIASTOLIC BLOOD PRESSURE: 77 MMHG | WEIGHT: 231.38 LBS | SYSTOLIC BLOOD PRESSURE: 113 MMHG

## 2019-08-19 DIAGNOSIS — E78.5 HYPERLIPIDEMIA, UNSPECIFIED HYPERLIPIDEMIA TYPE: ICD-10-CM

## 2019-08-19 DIAGNOSIS — Z78.9 ALCOHOL USE: ICD-10-CM

## 2019-08-19 PROCEDURE — 99204 OFFICE O/P NEW MOD 45 MIN: CPT | Mod: S$GLB,,, | Performed by: NURSE PRACTITIONER

## 2019-08-19 PROCEDURE — 99999 PR PBB SHADOW E&M-EST. PATIENT-LVL IV: ICD-10-PCS | Mod: PBBFAC,,, | Performed by: NURSE PRACTITIONER

## 2019-08-19 PROCEDURE — 3046F PR MOST RECENT HEMOGLOBIN A1C LEVEL > 9.0%: ICD-10-PCS | Mod: CPTII,S$GLB,, | Performed by: NURSE PRACTITIONER

## 2019-08-19 PROCEDURE — 3046F HEMOGLOBIN A1C LEVEL >9.0%: CPT | Mod: CPTII,S$GLB,, | Performed by: NURSE PRACTITIONER

## 2019-08-19 PROCEDURE — 3008F PR BODY MASS INDEX (BMI) DOCUMENTED: ICD-10-PCS | Mod: CPTII,S$GLB,, | Performed by: NURSE PRACTITIONER

## 2019-08-19 PROCEDURE — 99999 PR PBB SHADOW E&M-EST. PATIENT-LVL IV: CPT | Mod: PBBFAC,,, | Performed by: NURSE PRACTITIONER

## 2019-08-19 PROCEDURE — 99204 PR OFFICE/OUTPT VISIT, NEW, LEVL IV, 45-59 MIN: ICD-10-PCS | Mod: S$GLB,,, | Performed by: NURSE PRACTITIONER

## 2019-08-19 PROCEDURE — 3008F BODY MASS INDEX DOCD: CPT | Mod: CPTII,S$GLB,, | Performed by: NURSE PRACTITIONER

## 2019-08-19 RX ORDER — PEN NEEDLE, DIABETIC 30 GX3/16"
NEEDLE, DISPOSABLE MISCELLANEOUS
Qty: 100 EACH | Refills: 3 | Status: SHIPPED | OUTPATIENT
Start: 2019-08-19 | End: 2021-02-05 | Stop reason: SDUPTHER

## 2019-08-19 RX ORDER — GLIMEPIRIDE 4 MG/1
4 TABLET ORAL
Qty: 90 TABLET | Refills: 0 | Status: SHIPPED | OUTPATIENT
Start: 2019-08-19 | End: 2021-03-05

## 2019-08-19 RX ORDER — INSULIN GLARGINE 300 [IU]/ML
INJECTION, SOLUTION SUBCUTANEOUS
Qty: 3 SYRINGE | Refills: 0 | Status: SHIPPED | OUTPATIENT
Start: 2019-08-19 | End: 2019-09-17 | Stop reason: SDUPTHER

## 2019-08-19 NOTE — LETTER
August 19, 2019      Sae Starkey MD  [address] [place] [address]           [facility]  [address] [place] [address]  Phone: [phone]  Fax: [phone]          Patient: Abel Morris   MR Number: [id number]   YOB: 1965   Date of Visit: 8/19/2019       Dear Dr. Sae Starkey:    Thank you for referring Abel Morris to me for evaluation. Attached you will find relevant portions of my assessment and plan of care.    If you have questions, please do not hesitate to call me. I look forward to following Abel Morris along with you.    Sincerely,    Kirstin Horta NP    Enclosure  CC:  No Recipients    If you would like to receive this communication electronically, please contact [email or web address] or [phone] to request more information on [facility] access.    For providers and/or their staff who would like to refer a patient to [facility], please contact us through our one-stop-shop provider referral line, [facility], at [phone].    If you feel you have received this communication in error or would no longer like to receive these types of communications, please e-mail [email or web address]

## 2019-08-19 NOTE — PROGRESS NOTES
CC: This 53 y.o. Black or  male  is here for evaluation of  T2DM along with comorbidities indicated in the Visit Diagnosis section of this encounter.    HPI: Abel Morris was diagnosed with T2DM in ~ late 20s to early 30s.     New to Endocrine. Referred by Dr. Starkey.   Patient has developed loss of sensation to his feet.   Has not been taking glimepiride for maybe a year now.     LAST DIABETES EDUCATION: a class a few years ago     HOSPITALIZED FOR DIABETES-  No.    PRESCRIBED DIABETES MEDICATIONS: metformin 1000 mg bid, glimepiride 4 mg bid   Misses medication doses - Yes - as above     DM COMPLICATIONS: retinopathy and peripheral neuropathy    SIGNIFICANT DIABETES MED HISTORY: denies     SELF MONITORING BLOOD GLUCOSE: Checks blood glucose at home infrequent. Gets tired of seeing high BGs.     HYPOGLYCEMIC EPISODES: n/a      CURRENT DIET: drinks water and beer (6-12 cans/day) - bud light. Now that he's not working currently on disability - eats breakfast, lunch, then snacks on peanuts and gets too full for dinner. Then eats wakes up at 2 am hungry and eats a meal.   Sleeps off and on day and night.     Dinner was 1 popeyes fried chicken breast, 1/2 biscuit, red beans and rice, bud light. Overnight ate some of the red beans.     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 Poor coordination of legs.   OTHER: no night sweats       PHYSICAL EXAM:  GENERAL: Well  developed, well nourished. No acute distress.   PSYCH: AAOx3, appropriate mood and affect, conversant, casually-groomed. Judgement and insight good. Answers tend to be vague.   NEURO: Cranial nerves grossly intact. Speech clear, no tremor.   NECK: Trachea midline, no thyromegaly or lymphadenopathy.   CHEST: Respirations even and unlabored. CTA bilaterally.  CARDIOVASCULAR: Regular rate and rhythm. No bruits. No murmur. No edema.   ABDOMEN: Soft, non-tender, non-distended. Bowel sounds present.   MS: Gait steady. No clubbing.   SKIN: Normal skin turgor. Skin warm and dry. No areas of breakdown. + acanthosis nigricans.  FEET: Footware inappropriate. - wearing Crocs       Hemoglobin A1C   Date Value Ref Range Status   08/14/2019 9.2 (H) 4.0 - 5.6 % Final     Comment:     ADA Screening Guidelines:  5.7-6.4%  Consistent with prediabetes  >or=6.5%  Consistent with diabetes  High levels of fetal hemoglobin interfere with the HbA1C  assay. Heterozygous hemoglobin variants (HbS, HgC, etc)do  not significantly interfere with this assay.   However, presence of multiple variants may affect accuracy.     06/13/2019 10.8 (H) 4.0 - 5.6 % Final     Comment:     ADA Screening Guidelines:  5.7-6.4%  Consistent with prediabetes  >or=6.5%  Consistent with diabetes  High levels of fetal hemoglobin interfere with the HbA1C  assay. Heterozygous hemoglobin variants (HbS, HgC, etc)do  not significantly interfere with this assay.   However, presence of multiple variants may affect accuracy.     06/13/2019 10.8 (H) 4.0 - 5.6 % Final     Comment:     ADA Screening Guidelines:  5.7-6.4%  Consistent with prediabetes  >or=6.5%  Consistent with diabetes  High levels of fetal hemoglobin interfere with the HbA1C  assay. Heterozygous hemoglobin variants (HbS, HgC, etc)do  not significantly interfere with this assay.   However, presence of multiple variants may affect accuracy.             Chemistry        Component Value Date/Time     (L)  08/14/2019 0855    K 4.6 08/14/2019 0855    CL 98 08/14/2019 0855    CO2 24 08/14/2019 0855    BUN 15 08/14/2019 0855    CREATININE 1.4 08/14/2019 0855     (H) 08/14/2019 0855        Component Value Date/Time    CALCIUM 10.1 08/14/2019 0855    ALKPHOS 68 08/14/2019 0855    AST 17 08/14/2019 0855    ALT 24 08/14/2019 0855    BILITOT 0.4 08/14/2019 0855    ESTGFRAFRICA >60.0 08/14/2019 0855    EGFRNONAA 57.4 (A) 08/14/2019 0855          Lab Results   Component Value Date    LDLCALC 136.6 06/13/2019          Ref. Range 6/13/2019 08:57   Cholesterol Latest Ref Range: 120 - 199 mg/dL 212 (H)   HDL Latest Ref Range: 40 - 75 mg/dL 43   Hdl/Cholesterol Ratio Latest Ref Range: 20.0 - 50.0 % 20.3   LDL Cholesterol External Latest Ref Range: 63.0 - 159.0 mg/dL 136.6   Non-HDL Cholesterol Latest Units: mg/dL 169   Total Cholesterol/HDL Ratio Latest Ref Range: 2.0 - 5.0  4.9   Triglycerides Latest Ref Range: 30 - 150 mg/dL 162 (H)       Lab Results   Component Value Date    MICALBCREAT 4.7 08/02/2018               ASSESSMENT and PLAN:    A1C GOAL: < 7 %     1. Type 2 diabetes, uncontrolled, with neuropathy  Discussed progression of DM disease, long term complications, and tx options. Reviewed A1c and BG goals.     Reviewed importance of foot care. Needs to wear socks and shoes and avoid sandals/Crocs.     Continue metformin twice daily.   Restart glimepiride 4 mg ONCE daily with breakfast.   Should eat 3 meals/day and adopt regular day and night schedule. Avoid daytime naps.   Start Toujeo insulin at 20 units once daily.   See Diabetes Educator/Registered Dietician for Medical Nutrition Therapy and insulin training.   Test blood sugar 2x/day. - fasting and also before dinner/bedtime.   Return to clinic in 4 weeks.         Ambulatory Referral to Diabetes Education   2. Hyperlipidemia, unspecified hyperlipidemia type  Previously was not taking pravastatin regularly. Reinforced compliance   3. Alcohol use  Avoid drinking  more than 2 beers/day. Reviewed CV risk of excessive alcohol intake.        Orders Placed This Encounter   Procedures    Ambulatory Referral to Diabetes Education     Referral Priority:   Routine     Referral Type:   Consultation     Referral Reason:   Specialty Services Required     Requested Specialty:   Endocrinology     Number of Visits Requested:   1     Expiration Date:   8/19/2020        Follow up in about 4 weeks (around 9/16/2019).     Thank you very much for allowing me to participate in Abel Morris's care.

## 2019-08-19 NOTE — TELEPHONE ENCOUNTER
"----- Message from Ivelisse Infante MA sent at 8/19/2019 11:15 AM CDT -----  Contact: Melvin  Patient needs new RX. Read below.    [facility] [place]  ----- Message -----  From: Debbie Mcfadden  Sent: 8/19/2019  10:39 AM  To: [facility] Staff    Type:  Pharmacy Calling to Clarify an RX    Name of Caller: Daisha    Pharmacy Name: [facility]    Prescription Name:  pen needle, diabetic (NOVOFINE PLUS) 32 gauge x 1/6" Ndle no longer provided  What do they need to clarify?  Please send a Script for BD needles same length and gauge    Best Call Back Number:  [phone]          "

## 2019-08-20 ENCOUNTER — LAB VISIT (OUTPATIENT)
Dept: LAB | Facility: HOSPITAL | Age: 54
End: 2019-08-20
Attending: PSYCHIATRY & NEUROLOGY
Payer: COMMERCIAL

## 2019-08-20 ENCOUNTER — OFFICE VISIT (OUTPATIENT)
Dept: NEUROLOGY | Facility: CLINIC | Age: 54
End: 2019-08-20
Payer: COMMERCIAL

## 2019-08-20 VITALS
HEIGHT: 74 IN | SYSTOLIC BLOOD PRESSURE: 126 MMHG | HEART RATE: 89 BPM | DIASTOLIC BLOOD PRESSURE: 83 MMHG | WEIGHT: 235 LBS | BODY MASS INDEX: 30.16 KG/M2

## 2019-08-20 DIAGNOSIS — R29.898 BILATERAL LEG WEAKNESS: ICD-10-CM

## 2019-08-20 DIAGNOSIS — R29.898 BILATERAL LEG WEAKNESS: Primary | ICD-10-CM

## 2019-08-20 LAB — CK SERPL-CCNC: 143 U/L (ref 20–200)

## 2019-08-20 PROCEDURE — 99999 PR PBB SHADOW E&M-EST. PATIENT-LVL IV: ICD-10-PCS | Mod: PBBFAC,,, | Performed by: PSYCHIATRY & NEUROLOGY

## 2019-08-20 PROCEDURE — 82550 ASSAY OF CK (CPK): CPT

## 2019-08-20 PROCEDURE — 99204 OFFICE O/P NEW MOD 45 MIN: CPT | Mod: S$GLB,,, | Performed by: PSYCHIATRY & NEUROLOGY

## 2019-08-20 PROCEDURE — 3008F BODY MASS INDEX DOCD: CPT | Mod: CPTII,S$GLB,, | Performed by: PSYCHIATRY & NEUROLOGY

## 2019-08-20 PROCEDURE — 99999 PR PBB SHADOW E&M-EST. PATIENT-LVL IV: CPT | Mod: PBBFAC,,, | Performed by: PSYCHIATRY & NEUROLOGY

## 2019-08-20 PROCEDURE — 36415 COLL VENOUS BLD VENIPUNCTURE: CPT

## 2019-08-20 PROCEDURE — 99204 PR OFFICE/OUTPT VISIT, NEW, LEVL IV, 45-59 MIN: ICD-10-PCS | Mod: S$GLB,,, | Performed by: PSYCHIATRY & NEUROLOGY

## 2019-08-20 PROCEDURE — 3008F PR BODY MASS INDEX (BMI) DOCUMENTED: ICD-10-PCS | Mod: CPTII,S$GLB,, | Performed by: PSYCHIATRY & NEUROLOGY

## 2019-08-20 NOTE — LETTER
August 20, 2019      Sae Starkey MD  [address] [place] [address]           [facility] [address] [facility] [address]  [facility] [address]  Phone: [phone]  Fax: [phone]          Patient: Abel Morris   MR Number: [id number]   YOB: 1965   Date of Visit: 8/20/2019       Dear Dr. Sae Starkey:    Thank you for referring Abel Morris to me for evaluation. Attached you will find relevant portions of my assessment and plan of care.    If you have questions, please do not hesitate to call me. I look forward to following Abel Morris along with you.    Sincerely,    Arlene Barth,     Enclosure  CC:  No Recipients    If you would like to receive this communication electronically, please contact [email or web address] or [phone] to request more information on [email or web address] access.    For providers and/or their staff who would like to refer a patient to [facility], please contact us through our one-stop-shop provider referral line, [facility] , at [phone].    If you feel you have received this communication in error or would no longer like to receive these types of communications, please e-mail [email or web address]

## 2019-08-21 ENCOUNTER — CLINICAL SUPPORT (OUTPATIENT)
Dept: DIABETES | Facility: CLINIC | Age: 54
End: 2019-08-21
Payer: COMMERCIAL

## 2019-08-21 ENCOUNTER — TELEPHONE (OUTPATIENT)
Dept: ENDOCRINOLOGY | Facility: CLINIC | Age: 54
End: 2019-08-21

## 2019-08-21 VITALS — WEIGHT: 234.13 LBS | BODY MASS INDEX: 30.06 KG/M2

## 2019-08-21 DIAGNOSIS — E11.65 UNCONTROLLED TYPE 2 DIABETES MELLITUS WITH HYPERGLYCEMIA: Primary | ICD-10-CM

## 2019-08-21 DIAGNOSIS — E11.42 DIABETIC POLYNEUROPATHY ASSOCIATED WITH TYPE 2 DIABETES MELLITUS: ICD-10-CM

## 2019-08-21 PROCEDURE — G0108 DIAB MANAGE TRN  PER INDIV: HCPCS | Mod: S$GLB,,, | Performed by: DIETITIAN, REGISTERED

## 2019-08-21 PROCEDURE — G0108 PR DIAB MANAGE TRN  PER INDIV: ICD-10-PCS | Mod: S$GLB,,, | Performed by: DIETITIAN, REGISTERED

## 2019-08-21 NOTE — TELEPHONE ENCOUNTER
"----- Message from Bevmickie Dwyer sent at 8/21/2019  2:27 PM CDT -----  Contact: Daisha [phone]  Type:  Pharmacy Calling to Clarify an RX    Name of Caller: Daisha    Pharmacy Name:   [facility] [address] [place] [address] ([place], [place] - [address]  [address]  [place] [address]  Phone: [phone] Fax: [phone]    Prescription Name: pen needle, diabetic (BD ULTRA-FINE SHORT PEN NEEDLE) 31 gauge x 5/16" Ndle    What do they need to clarify? Pharmacy is calling to change the pen needles to a brand that they have BD ultra fine    Best Call Back Number: [phone]        "

## 2019-08-21 NOTE — PROGRESS NOTES
Diabetes Education  Author: Kira Roa RD  Date: 8/21/2019    Diabetes Care Management Summary  Diabetes Education Record Assessment/Progress: Initial  Current Diabetes Risk Level: Moderate     Last A1c:   Lab Results   Component Value Date    HGBA1C 9.2 (H) 08/14/2019     Last visit with Diabetes Educator: : 08/21/2019    Diabetes Type  Diabetes Type : Type II  Diabetes History  Diabetes Diagnosis: >10 years  Current Treatment: Diet, Insulin, Oral Medication    Health Maintenance was reviewed today with patient. Discussed with patient importance of routine eye exams, foot exams/foot care, blood work (i.e.: A1c, microalbumin, and lipid), dental visits, yearly flu vaccine, and pneumonia vaccine as indicated by PCP. Patient verbalized understanding.     Health Maintenance Topics with due status: Not Due       Topic Last Completion Date    Influenza Vaccine 09/14/2011    Eye Exam 11/28/2018    Lipid Panel 06/13/2019    Hemoglobin A1c 08/14/2019     Health Maintenance Due   Topic Date Due    TETANUS VACCINE  12/19/1984    Pneumococcal Vaccine (Medium Risk) (1 of 1 - PPSV23) 12/19/1985    Foot Exam  10/08/2016    Urine Microalbumin  08/02/2019       Nutrition  Meal Planning: skipping meals, drinks regular soda, water, eats out seldom(works 8pm-8am shift)  What type of sweetener do you use?: sugar  What type of beverages do you drink?: sport drinks, other (see comments), juice  Meal Plan 24 Hour Recall - Breakfast: gets in from work at 8am and has Bkfst at 9am: 4 cups salted grits w butter and occ cheese  Meal Plan 24 Hour Recall - Lunch: sleeps from 10am to 3pm  Meal Plan 24 Hour Recall - Dinner: 4pm: 4 boiled eggs or 2 cups red beans made w pigtails and  1 cup rice, one 16 oz beer w meal (several more at other times of the day before going to work)  Meal Plan 24 Hour Recall - Snack: at work pt may have a mid-shift snack of 6 bags of peanuts and a large Monster energy drink ( drink has 54 grams  "carb/sugar)    Monitoring   Monitoring: Other  Self Monitoring : not currently checking BG more than a few times a week  Blood Glucose Logs: No  Do you use a personal continuous glucose monitor?: No  In the last month, how often have you had a low blood sugar reaction?: never  Can you tell when your blood sugar is too high?: no    Exercise   Exercise Type: none(pain in legs)    Current Diabetes Treatment   Current Treatment: Diet, Insulin, Oral Medication    Social History  Preferred Learning Method: Face to Face, Group Education, Reading Materials  Primary Support: Self, Spouse  Educational Level: High School  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  Smoking Status: Current Smoker  Alcohol Use: Daily(6-12 beers/day)  Barriers to Change: None  Learning Challenges : None   Readiness to Learn : Acceptance  Cultural Influences: No    Diabetes Education Assessment/Progress  Diabetes Disease Process (diabetes disease process and treatment options): Discussion, Instructed, Comprehends Key Points, Written Materials Provided, Individual Session  -Pt stated he has not been eating correctly nor check BG as advised previoulsy  -Pt visit today focused on ntroducing pt to diabetes self management w emphasis on CHO awareness/counting, meal planning/label reading, SMBG, exercise, and meds.    Nutrition (Incorporating nutritional management into one's lifestyle): Individual Session, Written Materials Provided, Instructed, Discussion, Needs Review, Comprehends Key Points  -diet recall indicates pt eats large amt of carb at 2 meals/day (over 100+ g carb/meal); no fruit nura fresh , vegetable nura non-starch, low fat dairy or whole grains consumed at meals. Pt works night shift and has 1 or 2 (16 oz) energy drinks (each 54 g carb) and peanuts as snacks during evening shift.  -Rec'd pt develop an eating pattern during his "day" that includes 3 evenly spaced meals with 60 grams of carb/meal, to add more non-starchy " vegetables to meals and limit snacking to one 15-30 before sleep meal  - Discussed carb vs non-carb foods. Discussed appropriate amount of carbs to have at meals/snacks. Discussed appropriate serving sizes of individual carb items.Reviewed label reading, portion control (hand) and using the plate method of meal planning.   Pt states the diet seemed too restrictive for someone of his stature but urged pt to add more non-carb foods to his meals which will help with satiety. Pt will return in 1 month to discuss diet in more detail if needed  -Also reminded pt to restrict beer intake to 2- twelve ounce cans/day    Physical Activity (incorporating physical activity into one's lifestyle): Individual Session, Written Materials Provided, Instructed, Discussion, Comprehends Key Points  Medications (states correct name, dose, onset, peak, duration, side effects & timing of meds): Instructed, Comprehends Key Points, Demonstration, Discussion, Return Demonstration, Written Materials Provided, Individual Session, Demonstrates  Understanding/Competency(verbalizes/demonstrates)  -the patient was instructed on insulin supplies needed, storage of insulin,disposal of needles, and precautions related to hyper/hypoglycemia.  Injection sites were discussed and patient was instructed on importance of rotating sites.Pt demonstrated the injection process correctly and stated he felt comfortable w injecting insulin. Pt stated he would inject at same time every evening    Monitoring (monitoring blood glucose/other parameters & using results): Individual Session, Written Materials Provided, Instructed, Discussion, Needs Review, Comprehends Key Points  -pt admits to not checking BG often and does not keep log  --Reviewed A1c goal of 7 % or less and BGgoals of  pre meal/fasting, <180* 2hrpp.   Discussed BG readings and how to use data in DM self management; rec'd continue  SMBG 2x daily, fasting and before or 2 hr after dinner. Pt asked  "to keep log and bring to clinic appts/ copy log sheet provided.    Acute Complications (preventing, detecting, and treating acute complications): Individual Session, Written Materials Provided, Instructed, Discussion  -pt not experiencing hypo  -Reviewed s/s, causes, and treatment of hyperglycemia and hypoglycemia and use of  "rule of 15" w/ hypoglycemia.Pt encourage to carry hypoglycemia treatment at all time and to have hypoglycemic treatment supplies at bedside and while out/traveling.    Chronic Complications (preventing, detecting, and treating chronic complications): Individual Session, Written Materials Provided, Instructed, Discussion, Comprehends Key Points  Clinical (diabetes, other pertinent medical history, and relevant comorbidities reviewed during visit): Discussion, Individual Session, Written Materials Provided, Instructed, Comprehends Key Points  Cognitive (knowledge of self-management skills, functional health literacy): Discussion, Not Covered/Deferred(time restriction)  Psychosocial (emotional response to diabetes): Not Covered/Deferred(time restriction)  Diabetes Distress and Support Systems: Not Covered/Deferred(time restriction)  Behavioral (readiness for change, lifestyle practices, self-care behaviors): Not Covered/Deferred(time restriction)    Goals  Patient has selected/evaluated goals during today's session: Yes, selected  Healthy Eating: Set(will develop a standard eating pattern based on pts's work schedule and spacing meals 5 hours apart/day with 45-60 g carb/meal)  Start Date: 08/21/19  Target Date: 09/21/19     Diabetes Care Plan/Intervention  Education Plan/Intervention: Individual Follow-Up DSMT(follow up appt 9/26 at 11am after BELINDA Horta appt)    Diabetes Meal Plan  Restrictions: Restricted Carbohydrate  Carbohydrate Per Meal: 45-60g  Carbohydrate Per Snack : 15-20g    Today's Self-Management Care Plan was developed with the patient's input and is based on barriers identified " during today's assessment.    The long and short-term goals in the care plan were written with the patient/caregiver's input. The patient has agreed to work toward these goals to improve his overall diabetes control.      The patient received a copy of today's self-management plan and verbalized understanding of the care plan, goals, and all of today's instructions.      The patient was encouraged to communicate with his physician and care team regarding his condition(s) and treatment.  I provided the patient with my contact information today and encouraged him to contact me via phone or patient portal as needed.     Education Units of Time   Time Spent: 60 min

## 2019-08-22 ENCOUNTER — PROCEDURE VISIT (OUTPATIENT)
Dept: NEUROLOGY | Facility: CLINIC | Age: 54
End: 2019-08-22
Payer: COMMERCIAL

## 2019-08-22 VITALS — HEIGHT: 74 IN | BODY MASS INDEX: 30.03 KG/M2 | WEIGHT: 234 LBS

## 2019-08-22 DIAGNOSIS — R29.898 BILATERAL LEG WEAKNESS: ICD-10-CM

## 2019-08-22 DIAGNOSIS — R94.131 ABNORMAL EMG: Primary | ICD-10-CM

## 2019-08-22 PROCEDURE — 95911 PR NERVE CONDUCTION STUDY; 9-10 STUDIES: ICD-10-PCS | Mod: S$GLB,,, | Performed by: PSYCHIATRY & NEUROLOGY

## 2019-08-22 PROCEDURE — 95911 NRV CNDJ TEST 9-10 STUDIES: CPT | Mod: S$GLB,,, | Performed by: PSYCHIATRY & NEUROLOGY

## 2019-08-22 PROCEDURE — 95886 PR EMG COMPLETE, W/ NERVE CONDUCTION STUDIES, 5+ MUSCLES: ICD-10-PCS | Mod: S$GLB,,, | Performed by: PSYCHIATRY & NEUROLOGY

## 2019-08-22 PROCEDURE — 95886 MUSC TEST DONE W/N TEST COMP: CPT | Mod: S$GLB,,, | Performed by: PSYCHIATRY & NEUROLOGY

## 2019-08-27 ENCOUNTER — CLINICAL SUPPORT (OUTPATIENT)
Dept: REHABILITATION | Facility: HOSPITAL | Age: 54
End: 2019-08-27
Attending: PSYCHIATRY & NEUROLOGY
Payer: COMMERCIAL

## 2019-08-27 DIAGNOSIS — Z74.09 DECREASED STRENGTH, ENDURANCE, AND MOBILITY: ICD-10-CM

## 2019-08-27 DIAGNOSIS — R68.89 DECREASED STRENGTH, ENDURANCE, AND MOBILITY: ICD-10-CM

## 2019-08-27 DIAGNOSIS — R53.1 DECREASED STRENGTH, ENDURANCE, AND MOBILITY: ICD-10-CM

## 2019-08-27 DIAGNOSIS — R26.9 GAIT DIFFICULTY: ICD-10-CM

## 2019-08-27 DIAGNOSIS — R29.898 LEG WEAKNESS, BILATERAL: ICD-10-CM

## 2019-08-27 DIAGNOSIS — R26.89 IMPAIRMENT OF BALANCE: ICD-10-CM

## 2019-08-27 PROCEDURE — 97110 THERAPEUTIC EXERCISES: CPT | Mod: PN

## 2019-08-27 PROCEDURE — 97162 PT EVAL MOD COMPLEX 30 MIN: CPT | Mod: PN

## 2019-08-27 NOTE — PLAN OF CARE
[facility]  Physical Therapy Initial Evaluation    Name: Abel Morris  Clinic Number: [id number]    Therapy Diagnosis:   Encounter Diagnoses   Name Primary?    Leg weakness, bilateral     Decreased strength, endurance, and mobility     Gait difficulty     Impairment of balance      Physician: Arlene Barth DO    Physician Orders: PT Eval and Treat   Medical Diagnosis from Referral: R29.898 (ICD-10-CM) - Bilateral leg weakness  Evaluation Date: 8/27/2019  Authorization Period Expiration: 12/31/2019  Plan of Care Expiration: 11/27/2019  Visit # / Visits authorized: 1/20    Time In: 7:05a  Time Out: 8:00a  Total Billable Time: 55 minutes    Precautions: Standard, Diabetes and Fall    Subjective   Date of onset: 6 months   History of current condition - Abel reports: He has pain and weakness in his legs. He states It happened all of a sudden and it escalated until he went to the doctor. He reports no injury or illness prior to the incident. He states he just started feeling weak at work and had to use the scooter the rest of his shift. He states once he went to the doctor they started doing tests on him and they still don't know what it is. He is now on medical leave from work but plans to return. He's having some balance issues and difficulty walking normal. When he tries to walk faster, it gets worse. No family history of anything like this. No weakness in arms. He states pain in his legs, unsure if tingling sensation, but his feet are numb and he can't feel anything down there. He states driving is fine, have to adjust foot pressure. He is currently taking gabapentin - helps a little. He states no differences between the legs.     Medical History:   Past Medical History:   Diagnosis Date    Arthritis     Diabetes mellitus type II, uncontrolled 11/28/2012    Diabetes mellitus with neurological manifestations, uncontrolled 6/13/2014    ED (erectile dysfunction)  11/28/2012    Eye injury     os hit broken orbital bone     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    History of colonic polyps 8/17/2018    Hyperlipidemia 11/28/2012    Poor compliance 1/8/2014       Surgical History:   Abel Morris  has a past surgical history that includes Colonoscopy (N/A, 8/2/2016).    Medications:   Abel has a current medication list which includes the following prescription(s): gabapentin, gemfibrozil, glimepiride, insulin glargine (toujeo), metformin, pen needle, diabetic, polyethylene glycol, pravastatin, sildenafil, and vardenafil.    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  Prior Level of Function: independent   Current Level of Function: modified independent   DME Used: none used and none owned     Pain:  Current 2-3/10, worst 7/10, best 0/10   Location: bilateral lower legs  Description: Burning  Aggravating Factors: Walking and Night Time  Easing Factors: nothing    Pts goals: increase strength in legs     Objective     Observation: slow to rise from chair, staggering gait, slow gait speed     Posture:  Forward head and rounded shoulders     DTR:   Right Left   Patellar (L3-4) absent absent   Achilles (S1) absent absent   Brachioradialis 1+ 1+   Biceps  1+ 1+   triceps 1+ 1+      Babinski's: negative   Clonus: negative  Hanson's: negative     Lower Extremity Strength  Right LE  Left LE    Knee extension: 3+/5 Knee extension: 3+/5   Knee flexion: 3+/5 Knee flexion: 3+/5   Hip flexion: 4-/5 Hip flexion: 4-/5   Hip extension:  4-/5 Hip extension: 4-/5   Hip abduction: 3+/5 Hip abduction: 3+/5   Hip ER 4-/5 Hip ER 3+/5   Hip IR 4-/5 Hip IR 4-/5   Hip adduction:  "3-/5 Hip adduction 3-/5   Ankle dorsiflexion: 3-/5 Ankle dorsiflexion: 3-/5   Ankle plantarflexion: 3-/5 Ankle plantarflexion: 3-/5     Special Tests:   -FGA score: 21/30   -Single Limb Balance: unable bilaterally - poor strategies   -Mod CTSIB:    -Standing on ground, EO: 30 seconds - min to no sway   -Standing on ground, EC: 30 seconds - min sway (posterior > anterior)   -Standing on foam, EO: 30 seconds- min sway    -Standing on foam, EC: 30 seconds - min to mod sway (arms in high guard)   -10 meter walk test: 1.1m/s  -5 time sit to stand: 28 seconds - "medium difficulty"     Sensation: absent sensation over L4, L5 and S1 distally, saphenous nerve, sural nerve, tibial nerve distribution     Pulses: decreased pulse in bilateral distal LE pulses    Flexibility: decreased gastroc/soleus muscle length, hamstring distal/proximal length    CMS Impairment/Limitation/Restriction for FOTO Lower Leg (w/o knee) Survey    Therapist reviewed FOTO scores for Abel Morris on 8/27/2019.   FOTO documents entered into [employer] - see Media section.    Limitation Score: 57%  Category: Mobility    Current : CK = at least 40% but < 60% impaired, limited or restricted  Goal: CJ = at least 20% but < 40% impaired, limited or restricted       TREATMENT   Treatment Time In: 7:50a  Treatment Time Out: 8:00a  Total Treatment time separate from Evaluation: 10 minutes    Abel received therapeutic exercises to develop strength, endurance, ROM, flexibility, posture and core stabilization for 10 minutes including:    Bridges x5  Ankle pumps x5   LAQ x5   Supine marching x5    Home Exercises and Patient Education Provided    Education provided:   - prevent fatigue   - safety awareness   - HEP compliance  - Role of PT     Written Home Exercises Provided: yes.  Exercises were reviewed and Abel was able to demonstrate them prior to the end of the session.  Abel demonstrated good  understanding of the education provided.     See EMR under " Patient Instructions for exercises provided 8/27/2019.    Assessment   Abel is a 53 y.o. male referred to outpatient Physical Therapy with a medical diagnosis of Bilateral leg weakness. Pt presents to physical therapy after an insidious onset of BLE weakness that began about 6 months ago. The weakness and pain has progressed since then. Pt has signs and symptoms including decreased distal and proximal strength in BLE, absent sensation over bilateral distal legs & feet, absent L4/5, S1 reflexes, impaired gait and balance, endurance deficits, coordination impairments, and proprioception deficits. Pt is at a risk for falls, as demonstrated through an FGA score of 21/30. He has a gait speed of 1.1 indicating he is safe for community ambulation. He took 28 seconds to perform 5 time sit to stand indicating decreased LE strength, coordination, and ability to perform transfers independently when compared to his age normative value. Due to his impairments, pt is now on medical leave from work and is unable to participate in his normal daily activities. Due to the neurological component of peripheral nerve involvement, pt educated on preventing fatigue and working within his ability in order to improve neuron synapse and decrease demyelination. Pt would benefit from therapy in order to improve his strength, tolerance to standing activities, and decrease his risk of falls by improving dynamic and static balance.     Pt prognosis is Good.   Pt will benefit from skilled outpatient Physical Therapy to address the deficits stated above and in the chart below, provide pt/family education, and to maximize pt's level of independence.     Plan of care discussed with patient: Yes  Pt's spiritual, cultural and educational needs considered and patient is agreeable to the plan of care and goals as stated below:     Anticipated Barriers for therapy: none    Medical Necessity is demonstrated by the following  History  Co-morbidities and  personal factors that may impact the plan of care Co-morbidities:   diabetes, high BMI and hyperlipidemia    Personal Factors:   no deficits     moderate   Examination  Body Structures and Functions, activity limitations and participation restrictions that may impact the plan of care Body Regions:   lower extremities  trunk    Body Systems:    ROM  strength  gross coordinated movement  balance  gait  transfers  transitions  motor control  motor learning    Participation Restrictions:   Mod to max    Activity limitations:   Learning and applying knowledge  no deficits    General Tasks and Commands  no deficits    Communication  no deficits    Mobility  lifting and carrying objects  walking  driving (bike, car, motorcycle)    Self care  washing oneself (bathing, drying, washing hands)  caring for body parts (brushing teeth, shaving, grooming)  toileting  dressing    Domestic Life  shopping  cooking  doing house work (cleaning house, washing dishes, laundry)  assisting others    Interactions/Relationships  no deficits    Life Areas  employment    Community and Social Life  community life  recreation and leisure         moderate   Clinical Presentation evolving clinical presentation with changing clinical characteristics moderate   Decision Making/ Complexity Score: moderate     Goals:  Short Term Goals: 4 weeks   1. Pt will be independent with his HEP in order to demonstrate self-management of his condition.   2. Pt will demonstrate an increase in BLE muscle strength by 1/3 muscle grade in order to improve functional mobility.   3. Pt will be able to perform 5 time sit to  20 seconds to demonstrate increased LE strength, coordination, and ease with transfers.  4. Pt will demonstrate increased gait speed during 10 meter walk with safety and no LOB to 1.2m/s in order to ambulate in community more efficiently.  5. Pt will be able to walk 2 blocks with < or = to minimal difficulty in order to improve quality of  life.     Long Term Goals: 8 weeks   1. Pt will demonstrate in increase in BLE muscle strength by 1 muscle grade in order to improve functional mobility.   3. Pt will be able to perform 5 time sit to  17 seconds to demonstrate increased LE strength, coordination, and ease with transfers.  4. Pt will demonstrate increased gait speed during 10 meter walk with safety and no LOB to 1.3m/s in order to ambulate in community more efficiently.  5. Pt will demonstrate an increased FGA score to > or = to 23/30 to demonstrate decrease risk of falls.   6. Pt will be able to perform his usual work or housework activities with < or = to minimal difficulty in order to participate fully in his life.     Plan   Plan of care Certification: 8/27/2019 to 11/27/2019.    Outpatient Physical Therapy 1-2 times weekly for 10 weeks to include the following interventions: Gait Training, Manual Therapy, Moist Heat/ Ice, Neuromuscular Re-ed, Patient Education, Self Care, Therapeutic Activites and Therapeutic Exercise.     Mely Angel, PT  08/27/2019    Thank you for your referral!     I have seen the patient, reviewed the therapist's plan of care, and I agree with the plan of care.      I certify the need for these services furnished under this plan of treatment and while under my care.      ___________________ ________ Physician/Referring Practitioner             ___________________________ Date of Signature

## 2019-08-30 ENCOUNTER — PATIENT OUTREACH (OUTPATIENT)
Dept: ADMINISTRATIVE | Facility: OTHER | Age: 54
End: 2019-08-30

## 2019-09-03 ENCOUNTER — ANESTHESIA EVENT (OUTPATIENT)
Dept: ENDOSCOPY | Facility: HOSPITAL | Age: 54
End: 2019-09-03
Payer: COMMERCIAL

## 2019-09-03 ENCOUNTER — ANESTHESIA (OUTPATIENT)
Dept: ENDOSCOPY | Facility: HOSPITAL | Age: 54
End: 2019-09-03
Payer: COMMERCIAL

## 2019-09-03 ENCOUNTER — TELEPHONE (OUTPATIENT)
Dept: NEUROLOGY | Facility: CLINIC | Age: 54
End: 2019-09-03

## 2019-09-03 ENCOUNTER — HOSPITAL ENCOUNTER (OUTPATIENT)
Facility: HOSPITAL | Age: 54
Discharge: HOME | End: 2019-09-03
Attending: INTERNAL MEDICINE | Admitting: INTERNAL MEDICINE
Payer: COMMERCIAL

## 2019-09-03 VITALS
WEIGHT: 235 LBS | RESPIRATION RATE: 18 BRPM | TEMPERATURE: 99 F | HEIGHT: 75 IN | HEART RATE: 68 BPM | DIASTOLIC BLOOD PRESSURE: 66 MMHG | OXYGEN SATURATION: 98 % | BODY MASS INDEX: 29.22 KG/M2 | SYSTOLIC BLOOD PRESSURE: 124 MMHG

## 2019-09-03 DIAGNOSIS — R26.9 GAIT DIFFICULTY: ICD-10-CM

## 2019-09-03 DIAGNOSIS — Z12.11 COLON CANCER SCREENING: ICD-10-CM

## 2019-09-03 DIAGNOSIS — E66.3 OVERWEIGHT (BMI 25.0-29.9): ICD-10-CM

## 2019-09-03 DIAGNOSIS — E11.42 DIABETIC POLYNEUROPATHY ASSOCIATED WITH TYPE 2 DIABETES MELLITUS: ICD-10-CM

## 2019-09-03 DIAGNOSIS — I83.11 VARICOSE VEINS OF RIGHT LOWER EXTREMITY WITH INFLAMMATION: ICD-10-CM

## 2019-09-03 DIAGNOSIS — E11.65 UNCONTROLLED TYPE 2 DIABETES MELLITUS WITH HYPERGLYCEMIA: ICD-10-CM

## 2019-09-03 DIAGNOSIS — M77.00 MEDIAL EPICONDYLITIS OF ELBOW, UNSPECIFIED LATERALITY: ICD-10-CM

## 2019-09-03 DIAGNOSIS — R68.89 DECREASED STRENGTH, ENDURANCE, AND MOBILITY: ICD-10-CM

## 2019-09-03 DIAGNOSIS — Z74.09 DECREASED STRENGTH, ENDURANCE, AND MOBILITY: ICD-10-CM

## 2019-09-03 DIAGNOSIS — N52.9 ERECTILE DYSFUNCTION, UNSPECIFIED ERECTILE DYSFUNCTION TYPE: Primary | ICD-10-CM

## 2019-09-03 DIAGNOSIS — R29.898 LEG WEAKNESS, BILATERAL: ICD-10-CM

## 2019-09-03 DIAGNOSIS — E78.6 HDL LIPOPROTEIN DEFICIENCY: ICD-10-CM

## 2019-09-03 DIAGNOSIS — R26.89 IMPAIRMENT OF BALANCE: ICD-10-CM

## 2019-09-03 DIAGNOSIS — Z91.199 POOR COMPLIANCE: ICD-10-CM

## 2019-09-03 DIAGNOSIS — Z86.010 HISTORY OF COLONIC POLYPS: ICD-10-CM

## 2019-09-03 DIAGNOSIS — R53.1 DECREASED STRENGTH, ENDURANCE, AND MOBILITY: ICD-10-CM

## 2019-09-03 DIAGNOSIS — E78.5 HYPERLIPIDEMIA, UNSPECIFIED HYPERLIPIDEMIA TYPE: ICD-10-CM

## 2019-09-03 DIAGNOSIS — K92.1 BLOOD IN STOOL: ICD-10-CM

## 2019-09-03 LAB — POCT GLUCOSE: 102 MG/DL (ref 70–110)

## 2019-09-03 PROCEDURE — G0105 COLORECTAL SCRN; HI RISK IND: HCPCS | Mod: ,,, | Performed by: INTERNAL MEDICINE

## 2019-09-03 PROCEDURE — 82962 GLUCOSE BLOOD TEST: CPT | Performed by: INTERNAL MEDICINE

## 2019-09-03 PROCEDURE — D9220A PRA ANESTHESIA: ICD-10-PCS | Mod: CRNA,,, | Performed by: NURSE ANESTHETIST, CERTIFIED REGISTERED

## 2019-09-03 PROCEDURE — D9220A PRA ANESTHESIA: Mod: CRNA,,, | Performed by: NURSE ANESTHETIST, CERTIFIED REGISTERED

## 2019-09-03 PROCEDURE — G0121 COLON CA SCRN NOT HI RSK IND: HCPCS | Performed by: INTERNAL MEDICINE

## 2019-09-03 PROCEDURE — 63600175 PHARM REV CODE 636 W HCPCS: Performed by: ANESTHESIOLOGY

## 2019-09-03 PROCEDURE — 63600175 PHARM REV CODE 636 W HCPCS: Performed by: NURSE ANESTHETIST, CERTIFIED REGISTERED

## 2019-09-03 PROCEDURE — D9220A PRA ANESTHESIA: ICD-10-PCS | Mod: ANES,,, | Performed by: ANESTHESIOLOGY

## 2019-09-03 PROCEDURE — 37000008 HC ANESTHESIA 1ST 15 MINUTES: Performed by: INTERNAL MEDICINE

## 2019-09-03 PROCEDURE — G0105 COLORECTAL SCRN; HI RISK IND: ICD-10-PCS | Mod: ,,, | Performed by: INTERNAL MEDICINE

## 2019-09-03 PROCEDURE — 37000009 HC ANESTHESIA EA ADD 15 MINS: Performed by: INTERNAL MEDICINE

## 2019-09-03 PROCEDURE — D9220A PRA ANESTHESIA: Mod: ANES,,, | Performed by: ANESTHESIOLOGY

## 2019-09-03 RX ORDER — PROPOFOL 10 MG/ML
VIAL (ML) INTRAVENOUS
Status: COMPLETED
Start: 2019-09-03 | End: 2019-09-03

## 2019-09-03 RX ORDER — LIDOCAINE HCL/PF 100 MG/5ML
SYRINGE (ML) INTRAVENOUS
Status: DISCONTINUED | OUTPATIENT
Start: 2019-09-03 | End: 2019-09-03

## 2019-09-03 RX ORDER — LIDOCAINE HYDROCHLORIDE 20 MG/ML
INJECTION, SOLUTION EPIDURAL; INFILTRATION; INTRACAUDAL; PERINEURAL
Status: DISCONTINUED
Start: 2019-09-03 | End: 2019-09-03 | Stop reason: HOSPADM

## 2019-09-03 RX ORDER — SODIUM CHLORIDE 9 MG/ML
INJECTION, SOLUTION INTRAVENOUS CONTINUOUS PRN
Status: DISCONTINUED | OUTPATIENT
Start: 2019-09-03 | End: 2019-09-03

## 2019-09-03 RX ORDER — SODIUM CHLORIDE 0.9 % (FLUSH) 0.9 %
10 SYRINGE (ML) INJECTION
Status: DISCONTINUED | OUTPATIENT
Start: 2019-09-03 | End: 2019-09-03 | Stop reason: HOSPADM

## 2019-09-03 RX ORDER — PROPOFOL 10 MG/ML
VIAL (ML) INTRAVENOUS
Status: DISCONTINUED | OUTPATIENT
Start: 2019-09-03 | End: 2019-09-03

## 2019-09-03 RX ORDER — SODIUM CHLORIDE 9 MG/ML
INJECTION, SOLUTION INTRAVENOUS ONCE
Status: COMPLETED | OUTPATIENT
Start: 2019-09-03 | End: 2019-09-03

## 2019-09-03 RX ADMIN — LIDOCAINE HYDROCHLORIDE 100 MG: 20 INJECTION, SOLUTION INTRAVENOUS at 09:09

## 2019-09-03 RX ADMIN — PROPOFOL 100 MG: 10 INJECTION, EMULSION INTRAVENOUS at 09:09

## 2019-09-03 RX ADMIN — PROPOFOL 50 MG: 10 INJECTION, EMULSION INTRAVENOUS at 09:09

## 2019-09-03 RX ADMIN — SODIUM CHLORIDE: 0.9 INJECTION, SOLUTION INTRAVENOUS at 08:09

## 2019-09-03 RX ADMIN — PROPOFOL 150 MG: 10 INJECTION, EMULSION INTRAVENOUS at 09:09

## 2019-09-03 NOTE — [employer] PATIENT INSTRUCTIONS
Discharge Summary/Instructions after an Endoscopic Procedure  Patient Name: Abel Morris  Patient MRN: [id number]  Patient YOB: 1965 Tuesday, September 03, 2019  Álvaro Carmichael MD  RESTRICTIONS:  During your procedure today, you received medications for sedation.  These   medications may affect your judgment, balance and coordination.  Therefore,   for 24 hours, you have the following restrictions:   - DO NOT drive a car, operate machinery, make legal/financial decisions,   sign important papers or drink alcohol.    ACTIVITY:  Today: no heavy lifting, straining or running due to procedural   sedation/anesthesia.  The following day: return to full activity including work.  DIET:  Eat and drink normally unless instructed otherwise.     TREATMENT FOR COMMON SIDE EFFECTS:  - Mild abdominal pain, nausea, belching, bloating or excessive gas:  rest,   eat lightly and use a heating pad.  - Sore Throat: treat with throat lozenges and/or gargle with warm salt   water.  - Because air was used during the procedure, expelling large amounts of air   from your rectum or belching is normal.  - If a bowel prep was taken, you may not have a bowel movement for 1-3 days.    This is normal.  SYMPTOMS TO WATCH FOR AND REPORT TO YOUR PHYSICIAN:  1. Abdominal pain or bloating, other than gas cramps.  2. Chest pain.  3. Back pain.  4. Signs of infection such as: chills or fever occurring within 24 hours   after the procedure.  5. Rectal bleeding, which would show as bright red, maroon, or black stools.   (A tablespoon of blood from the rectum is not serious, especially if   hemorrhoids are present.)  6. Vomiting.  7. Weakness or dizziness.  GO DIRECTLY TO THE NEAREST EMERGENCY ROOM IF YOU HAVE ANY OF THE FOLLOWING:      Difficulty breathing              Chills and/or fever over 101 F   Persistent vomiting and/or vomiting blood   Severe abdominal pain   Severe chest pain   Black, tarry stools   Bleeding- more than one  tablespoon   Any other symptom or condition that you feel may need urgent attention  Your doctor recommends these additional instructions:  If any biopsies were taken, your doctors clinic will contact you in 1 to 2   weeks with any results.  - Repeat colonoscopy in 5 years for surveillance.   - Return to referring physician as previously scheduled.   - Discharge patient to home (via wheelchair).  For questions, problems or results please call your physician - Álvaro Carmichael MD at Work:  [phone].  [facility] can be reached at [phone]     IF A COMPLICATION OR EMERGENCY SITUATION ARISES AND YOU ARE UNABLE TO REACH   YOUR PHYSICIAN - GO DIRECTLY TO THE EMERGENCY ROOM.  Álvaro Carmichael MD  9/3/2019 9:35:12 AM  This report has been verified and signed electronically.  [employer]

## 2019-09-03 NOTE — ANESTHESIA POSTPROCEDURE EVALUATION
Anesthesia Post Evaluation    Patient: Abel Morris    Procedure(s) Performed: Procedure(s) (LRB):  COLONOSCOPY (N/A)    Final Anesthesia Type: general  Patient location during evaluation: PACU  Patient participation: Yes- Able to Participate  Level of consciousness: awake and alert, oriented and awake  Post-procedure vital signs: reviewed and stable  Pain management: adequate  Airway patency: patent  PONV status at discharge: No PONV  Anesthetic complications: no      Cardiovascular status: blood pressure returned to baseline, hemodynamically stable and stable  Respiratory status: unassisted and spontaneous ventilation  Hydration status: euvolemic  Follow-up not needed.          Vitals Value Taken Time   /66 9/3/2019 10:07 AM   Temp 37 °C (98.6 °F) 9/3/2019  9:37 AM   Pulse 68 9/3/2019 10:07 AM   Resp 18 9/3/2019 10:07 AM   SpO2 98 % 9/3/2019 10:07 AM         Event Time     Out of Recovery 10:18:54          Pain/Jessy Score: Jessy Score: 10 (9/3/2019 10:07 AM)  Modified Jessy Score: 20 (9/3/2019 10:07 AM)

## 2019-09-03 NOTE — H&P
Pre-Procedure H&P:  Reason for Procedure:  History of colon polyps    HPI:  Pt is a 53 y.o. male with a history of multiple colon polyps noted on a 2016 colonoscopy by Dr. Hernández.  One of the polyps was 2.5 cm.  He is here for surveillance.    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    History of colonic polyps 8/17/2018    Hyperlipidemia 11/28/2012    Poor compliance 1/8/2014       Past Surgical History:   Procedure Laterality Date    COLONOSCOPY N/A 8/2/2016    Performed by Miguel [facility]aud [facility] at [facility]       Family History   Problem Relation Age of Onset    No Known Problems Mother     Colon polyps Father 61    No Known Problems Sister     No Known Problems Brother     No Known Problems Maternal Aunt     No Known Problems Maternal Uncle     No Known Problems Paternal Aunt     No Known Problems Paternal Uncle     Diabetes Maternal Grandmother     No Known Problems Maternal Grandfather     No Known Problems Paternal Grandmother     No Known Problems Paternal Grandfather     Amblyopia Neg Hx     Blindness Neg Hx     Cancer Neg Hx     Cataracts Neg Hx     Glaucoma Neg Hx     Hypertension Neg Hx     Macular degeneration Neg Hx     Retinal detachment Neg Hx     Strabismus Neg Hx     Stroke Neg Hx     Thyroid disease Neg Hx        Social History     Socioeconomic History    Marital status:      Spouse name: Not on file    Number of children: Not on file    Years of education: Not on file    Highest education level: Not on file   Occupational History    Not on file   Social Needs    Financial resource strain: Somewhat hard    Food insecurity:     Worry: Never true     Inability: Never true     "Transportation needs:     Medical: No     Non-medical: No   Tobacco Use    Smoking status: Current Every Day Smoker     Types: Cigars    Smokeless tobacco: Never Used   Substance and Sexual Activity    Alcohol use: Yes     Alcohol/week: 3.6 oz     Types: 6 Cans of beer per week     Frequency: 4 or more times a week     Drinks per session: 10 or more     Binge frequency: Daily or almost daily     Comment: per week    Drug use: No    Sexual activity: Not on file   Lifestyle    Physical activity:     Days per week: 4 days     Minutes per session: Not on file    Stress: Not at all   Relationships    Social connections:     Talks on phone: More than three times a week     Gets together: More than three times a week     Attends [employer] service: Not on file     Active member of club or organization: Yes     Attends meetings of clubs or organizations: More than 4 times per year     Relationship status:    Other Topics Concern    Not on file   Social History Narrative    Not on file       Endoscopic History:  2016 colonoscopy    Review of patient's allergies indicates:  No Known Allergies    No current facility-administered medications on file prior to encounter.      Current Outpatient Medications on File Prior to Encounter   Medication Sig Dispense Refill    metFORMIN (GLUCOPHAGE) 1000 MG tablet 1 Tablet Oral Twice a day 180 tablet 12    sildenafil (REVATIO) 20 mg Tab 1-5 pills at a time per day prn 30 tablet 11    vardenafil (LEVITRA) 20 MG tablet Half to one qd prn 10 tablet 12    pravastatin (PRAVACHOL) 40 MG tablet Take 1 tablet (40 mg total) by mouth once daily. 90 tablet 12       Current Facility-Administered Medications:     sodium chloride 0.9% flush 10 mL, 10 mL, Intravenous, PRN, Zacarias Figueroa MD    ROS: Negative x 10    Patient Vitals for the past 24 hrs:   BP Temp Pulse Resp SpO2 Height Weight   09/03/19 0847 138/74 97.8 °F (36.6 °C) 82 18 98 % 6' 3" (1.905 m) 106.6 kg (235 lb) "       Gen: Well developed, well nourished, no apparent distress  HEENT: Anicteric, PERRLA  CV: S1, S2, no murmers/rubs, non-displaced PMI  Lungs: CTA-B, normal excursion  Abd: Soft, NT, ND, normal BS's, no HSM  Ext: No c/c/e, 1+ DP pulses to BLE's  Neuro: CN II-XII grossly intact, no asterixis.  Skin: No rashes/lesions.  Psych: AA&O x 4    Assessment:  Pt. Is a 53 y.o. male with history of colon polyps on 2016 for surveillance.      Recommendations:  1.Colonoscopy

## 2019-09-03 NOTE — TELEPHONE ENCOUNTER
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

## 2019-09-03 NOTE — ANESTHESIA PREPROCEDURE EVALUATION
09/03/2019  Abel Morris is a 53 y.o., male.    Anesthesia Evaluation         Review of Systems  Anesthesia Hx:  No previous Anesthesia   Social:  Non-Smoker    Hematology/Oncology:  Hematology Normal   Oncology Normal     EENT/Dental:EENT/Dental Normal   Cardiovascular:  Cardiovascular Normal     Pulmonary:  Pulmonary Normal    Renal/:  Renal/ Normal     Hepatic/GI:  Hepatic/GI Normal    Musculoskeletal:  Musculoskeletal Normal    Neurological:   Neuromuscular Disease,    Endocrine:   Diabetes    Dermatological:  Skin Normal    Psych:  Psychiatric Normal           Physical Exam  General:  Well nourished    Airway/Jaw/Neck:  Airway Findings: Mallampati: II TM Distance: 4 - 6 cm      Dental:  DENTAL FINDINGS: Normal   Chest/Lungs:  Chest/Lungs Clear    Heart/Vascular:  Heart Findings: Normal       Mental Status:  Mental Status Findings:  Cooperative, Alert and Oriented         Anesthesia Plan  Type of Anesthesia, risks & benefits discussed:  Anesthesia Type:  general  Patient's Preference:   Intra-op Monitoring Plan: standard ASA monitors  Intra-op Monitoring Plan Comments:   Post Op Pain Control Plan: multimodal analgesia, IV/PO Opioids PRN and per primary service following discharge from PACU  Post Op Pain Control Plan Comments:   Induction:    Beta Blocker:  Patient is not currently on a Beta-Blocker (No further documentation required).       Informed Consent: Patient understands risks and agrees with Anesthesia plan.  Questions answered. Anesthesia consent signed with patient.  ASA Score: 3     Day of Surgery Review of History & Physical:    H&P update referred to the provider.  H&P completed by Anesthesiologist.   Anesthesia Plan Notes: npo        Ready For Surgery From Anesthesia Perspective.

## 2019-09-03 NOTE — TRANSFER OF CARE
"Anesthesia Transfer of Care Note    Patient: Abel Morris    Procedure(s) Performed: Procedure(s) (LRB):  COLONOSCOPY (N/A)    Patient location: PACU    Anesthesia Type: general    Transport from OR: Transported from OR on room air with adequate spontaneous ventilation    Post pain: adequate analgesia    Post assessment: no apparent anesthetic complications and tolerated procedure well    Post vital signs: stable    Level of consciousness: awake, alert and oriented    Nausea/Vomiting: no nausea/vomiting    Complications: none    Transfer of care protocol was followed      Last vitals:   Visit Vitals  /64   Pulse 84   Temp 37 °C (98.6 °F) (Oral)   Resp 16   Ht 6' 3" (1.905 m)   Wt 106.6 kg (235 lb)   SpO2 96%   BMI 29.37 kg/m²     "

## 2019-09-04 ENCOUNTER — DOCUMENTATION ONLY (OUTPATIENT)
Dept: NEUROLOGY | Facility: CLINIC | Age: 54
End: 2019-09-04

## 2019-09-04 NOTE — PROCEDURES
Procedures     EMG/NCS was performed. Please see scanned EMG report for further details. There is electrodiagnostic evidence of a severe generalized sensorimotor polyneuropathy with active and chronic denervation in various muscles of the upper and lower extremities. Overlying lumbar and cervical radiculopathies cannot be ruled out. There is electrodiagnostic evidence of a mild right median neuropathy at the wrist.    Given diffuse active denervation, I recommend an MRI C, T and L spine w/wo contrast to evaluate for polyradiculopathy.     I will see patient back in 2 weeks or sooner if necessary to review imaging findings.     Patient was advised to notify me for worsening symptoms.    Arlene Barth DO

## 2019-09-05 ENCOUNTER — CLINICAL SUPPORT (OUTPATIENT)
Dept: REHABILITATION | Facility: HOSPITAL | Age: 54
End: 2019-09-05
Attending: PSYCHIATRY & NEUROLOGY
Payer: COMMERCIAL

## 2019-09-05 DIAGNOSIS — R53.1 DECREASED STRENGTH, ENDURANCE, AND MOBILITY: ICD-10-CM

## 2019-09-05 DIAGNOSIS — R26.89 IMPAIRMENT OF BALANCE: ICD-10-CM

## 2019-09-05 DIAGNOSIS — R68.89 DECREASED STRENGTH, ENDURANCE, AND MOBILITY: ICD-10-CM

## 2019-09-05 DIAGNOSIS — Z74.09 DECREASED STRENGTH, ENDURANCE, AND MOBILITY: ICD-10-CM

## 2019-09-05 DIAGNOSIS — R29.898 LEG WEAKNESS, BILATERAL: ICD-10-CM

## 2019-09-05 DIAGNOSIS — R26.9 GAIT DIFFICULTY: ICD-10-CM

## 2019-09-05 PROCEDURE — 97110 THERAPEUTIC EXERCISES: CPT | Mod: PN

## 2019-09-05 PROCEDURE — 97112 NEUROMUSCULAR REEDUCATION: CPT | Mod: PN

## 2019-09-05 NOTE — PROGRESS NOTES
Physical Therapy Daily Treatment Note     Name: Abel Romero Morris  Clinic Number: [id number]    Therapy Diagnosis:   Encounter Diagnoses   Name Primary?    Leg weakness, bilateral     Decreased strength, endurance, and mobility     Gait difficulty     Impairment of balance      Physician: Arlene Barth DO    Physician Orders: PT Eval and Treat   Medical Diagnosis from Referral: R29.898 (ICD-10-CM) - Bilateral leg weakness  Evaluation Date: 8/27/2019  Authorization Period Expiration: 12/31/2019  Plan of Care Expiration: 11/27/2019  Visit # / Visits authorized: 2/20                       PN Due: 9/24/2019     Time In: 805  Time Out: 855  Total Time: 50 minutes  Total Billable Time: 50 minutes    Precautions: Standard, Diabetes and Fall    Subjective     Pt reports: He did not take his gabapentin yet this morning so he is feeling some increased pain in his B LEs.     He was compliant with home exercise program.  Response to previous treatmen: no increased pain/discomfort  Functional change: none reported    Pain: 3/10  Location: bilateral legs      Objective     Abel received therapeutic exercises to develop strength, endurance and ROM for 30 minutes including:    Ankle Pumps 2x10  Bridges 2x10  LAQs 2x10  Hamstring Stretch 3x30 ea  Supine Marching 2x10    Abel participated in neuromuscular re-education activities to improve: Balance, Coordination and Proprioception for 20 minutes. The following activities were included:    Balance on firm surface:   Standing, feet together 3x30 ea   Standing feet apart eyes closed 3x30 ea   Standing feet apart horizontal/vertical head turns 3x30 ea    Home Exercises Provided and Patient Education Provided     Education provided:   Cont to perform HEP as provided.     Written Home Exercises Provided: Patient instructed to cont prior HEP.  Exercises were reviewed and Abel was able to demonstrate them prior to the end of the session.  Abel demonstrated good   understanding of the education provided.     See EMR under Patient Instructions for exercises provided prior visit.    Assessment     Pt tolerated initial treatment well overall with no adverse response noted. Multiple rest breaks taken between there-ex and standing balance activities. Pt reporting increased tinglnig in LEs to about 3-4/10 during treatment, PT advised pt to take rest if pain/tingliing increased to greater than 5/10. Pt with most difficulty completing LAQs this session. No LOB noted during balance activities.     Abel is progressing well towards his goals.   Pt prognosis is Good.     Pt will continue to benefit from skilled outpatient physical therapy to address the deficits listed in the problem list box on initial evaluation, provide pt/family education and to maximize pt's level of independence in the home and community environment.     Pt's spiritual, cultural and educational needs considered and pt agreeable to plan of care and goals.    Anticipated barriers to physical therapy: none    Goals:  Short Term Goals: 4 weeks   1. Pt will be independent with his HEP in order to demonstrate self-management of his condition. - progressing  2. Pt will demonstrate an increase in BLE muscle strength by 1/3 muscle grade in order to improve functional mobility.   3. Pt will be able to perform 5 time sit to  20 seconds to demonstrate increased LE strength, coordination, and ease with transfers.  4. Pt will demonstrate increased gait speed during 10 meter walk with safety and no LOB to 1.2m/s in order to ambulate in community more efficiently.  5. Pt will be able to walk 2 blocks with < or = to minimal difficulty in order to improve quality of life.      Long Term Goals: 8 weeks   1. Pt will demonstrate in increase in BLE muscle strength by 1 muscle grade in order to improve functional mobility.   3. Pt will be able to perform 5 time sit to  17 seconds to demonstrate increased LE strength,  coordination, and ease with transfers.  4. Pt will demonstrate increased gait speed during 10 meter walk with safety and no LOB to 1.3m/s in order to ambulate in community more efficiently.  5. Pt will demonstrate an increased FGA score to > or = to 23/30 to demonstrate decrease risk of falls.   6. Pt will be able to perform his usual work or housework activities with < or = to minimal difficulty in order to participate fully in his life.     Plan     Certification date: 8/27/2019 - 11/27/2019    Cont skilled PT session towards PT and patient's goals. Continue progression of LE strengthening and balance as tolerated    Arabella Rainey, PT, DPT  09/05/2019

## 2019-09-10 ENCOUNTER — CLINICAL SUPPORT (OUTPATIENT)
Dept: REHABILITATION | Facility: HOSPITAL | Age: 54
End: 2019-09-10
Attending: PSYCHIATRY & NEUROLOGY
Payer: COMMERCIAL

## 2019-09-10 DIAGNOSIS — Z74.09 DECREASED STRENGTH, ENDURANCE, AND MOBILITY: ICD-10-CM

## 2019-09-10 DIAGNOSIS — R26.9 GAIT DIFFICULTY: ICD-10-CM

## 2019-09-10 DIAGNOSIS — R53.1 DECREASED STRENGTH, ENDURANCE, AND MOBILITY: ICD-10-CM

## 2019-09-10 DIAGNOSIS — R29.898 LEG WEAKNESS, BILATERAL: ICD-10-CM

## 2019-09-10 DIAGNOSIS — R26.89 IMPAIRMENT OF BALANCE: ICD-10-CM

## 2019-09-10 DIAGNOSIS — R68.89 DECREASED STRENGTH, ENDURANCE, AND MOBILITY: ICD-10-CM

## 2019-09-10 PROCEDURE — 97110 THERAPEUTIC EXERCISES: CPT | Mod: PN

## 2019-09-10 PROCEDURE — 97112 NEUROMUSCULAR REEDUCATION: CPT | Mod: PN

## 2019-09-10 NOTE — PROGRESS NOTES
Physical Therapy Daily Treatment Note     Name: Abel Morris  Clinic Number: [id number]    Therapy Diagnosis:   Encounter Diagnoses   Name Primary?    Leg weakness, bilateral     Decreased strength, endurance, and mobility     Gait difficulty     Impairment of balance      Physician: Arlene Barth DO    Physician Orders: PT Eval and Treat   Medical Diagnosis from Referral: R29.898 (ICD-10-CM) - Bilateral leg weakness  Evaluation Date: 8/27/2019  Authorization Period Expiration: 12/31/2019  Plan of Care Expiration: 11/27/2019  Visit # / Visits authorized: 3/20                       PN Due: 9/24/2019     Time In: 6:58a  Time Out: 7:55a  Total Time: 57 minutes  Total Billable Time: 25 minutes    Precautions: Standard, Diabetes and Fall    Subjective     Pt reports: he feels pretty good today. Last treatment went well and he has been doing his exercises.      He was compliant with home exercise program.  Response to previous treatmen: no increased pain/discomfort  Functional change: none reported    Pain: 3/10  Location: bilateral legs      Objective     Abel received therapeutic exercises to develop strength, endurance and ROM for 27 minutes including:    Nustep 2x2 min - rest break in between   Bridges 2x10  LAQs 2x10  Seated toe raise 2x10  Hamstring Stretch 3x30 ea  Standing marching 2x10   Standing hamstring curls 2x10  Standing heel raise 2x10    Abel participated in neuromuscular re-education activities to improve: Balance, Coordination and Proprioception for 30 minutes. The following activities were included:    Balance on firm surface:   Standing, feet together 3x30 ea   Standing feet together eyes closed 3x30 ea   Standing feet together horizontal/vertical head turns 3x30 ea   Tandem standing 3x30 ea  Foam surface:    Standing feet together, EC [id number]    Standing feet together horizontal/vertical head turns 3x30 sec ea     Home Exercises Provided and Patient Education Provided      Education provided:   Cont to perform HEP as provided.     Written Home Exercises Provided: Patient instructed to cont prior HEP.  Exercises were reviewed and Abel was able to demonstrate them prior to the end of the session.  Abel demonstrated good  understanding of the education provided.     See EMR under Patient Instructions for exercises provided prior visit.    Assessment     Pt tolerated treatment well overall with no adverse affects. He required rest breaks throughout the session, but had no increased tingling sensation in legs which is due to him taking gabapentin prior to coming to therapy. Reviewed with patient importance of decreasing fatigue during exercises, but overall had decreased rest breaks as compared to last session. He had good muscle response with new exercises added with most difficult still remaining LAQs and standing marching. Pt had most difficulty with tandem balance exercises requiring UE support occasionally.      Abel is progressing well towards his goals.   Pt prognosis is Good.     Pt will continue to benefit from skilled outpatient physical therapy to address the deficits listed in the problem list box on initial evaluation, provide pt/family education and to maximize pt's level of independence in the home and community environment.     Pt's spiritual, cultural and educational needs considered and pt agreeable to plan of care and goals.    Anticipated barriers to physical therapy: none    Goals:  Short Term Goals: 4 weeks   1. Pt will be independent with his HEP in order to demonstrate self-management of his condition. - progressing  2. Pt will demonstrate an increase in BLE muscle strength by 1/3 muscle grade in order to improve functional mobility. progressing  3. Pt will be able to perform 5 time sit to  20 seconds to demonstrate increased LE strength, coordination, and ease with transfers. progressing  4. Pt will demonstrate increased gait speed during 10 meter  walk with safety and no LOB to 1.2m/s in order to ambulate in community more efficiently. progressing  5. Pt will be able to walk 2 blocks with < or = to minimal difficulty in order to improve quality of life. progressing     Long Term Goals: 8 weeks   1. Pt will demonstrate in increase in BLE muscle strength by 1 muscle grade in order to improve functional mobility. progressing  3. Pt will be able to perform 5 time sit to  17 seconds to demonstrate increased LE strength, coordination, and ease with transfers. progressing  4. Pt will demonstrate increased gait speed during 10 meter walk with safety and no LOB to 1.3m/s in order to ambulate in community more efficiently. progressing  5. Pt will demonstrate an increased FGA score to > or = to 23/30 to demonstrate decrease risk of falls. progressing  6. Pt will be able to perform his usual work or housework activities with < or = to minimal difficulty in order to participate fully in his life. progressing    Plan     Certification date: 8/27/2019 - 11/27/2019    Cont skilled PT session towards PT and patient's goals. Continue progression of LE strengthening and balance as tolerated    Mely Angel, PT, DPT  09/10/2019

## 2019-09-12 ENCOUNTER — PATIENT OUTREACH (OUTPATIENT)
Dept: ADMINISTRATIVE | Facility: OTHER | Age: 54
End: 2019-09-12

## 2019-09-12 ENCOUNTER — CLINICAL SUPPORT (OUTPATIENT)
Dept: REHABILITATION | Facility: HOSPITAL | Age: 54
End: 2019-09-12
Attending: PSYCHIATRY & NEUROLOGY
Payer: COMMERCIAL

## 2019-09-12 DIAGNOSIS — R29.898 LEG WEAKNESS, BILATERAL: ICD-10-CM

## 2019-09-12 DIAGNOSIS — R53.1 DECREASED STRENGTH, ENDURANCE, AND MOBILITY: ICD-10-CM

## 2019-09-12 DIAGNOSIS — R26.9 GAIT DIFFICULTY: ICD-10-CM

## 2019-09-12 DIAGNOSIS — Z74.09 DECREASED STRENGTH, ENDURANCE, AND MOBILITY: ICD-10-CM

## 2019-09-12 DIAGNOSIS — R68.89 DECREASED STRENGTH, ENDURANCE, AND MOBILITY: ICD-10-CM

## 2019-09-12 DIAGNOSIS — R26.89 IMPAIRMENT OF BALANCE: ICD-10-CM

## 2019-09-12 PROCEDURE — 97110 THERAPEUTIC EXERCISES: CPT | Mod: PN

## 2019-09-12 PROCEDURE — 97112 NEUROMUSCULAR REEDUCATION: CPT | Mod: PN

## 2019-09-12 NOTE — PROGRESS NOTES
Physical Therapy Daily Treatment Note     Name: Abel Morris  Clinic Number: [id number]    Therapy Diagnosis:   Encounter Diagnoses   Name Primary?    Leg weakness, bilateral     Decreased strength, endurance, and mobility     Gait difficulty     Impairment of balance      Physician: Arlene Barth DO    Physician Orders: PT Eval and Treat   Medical Diagnosis from Referral: R29.898 (ICD-10-CM) - Bilateral leg weakness  Evaluation Date: 8/27/2019  Authorization Period Expiration: 12/31/2019  Plan of Care Expiration: 11/27/2019  Visit # / Visits authorized: 4/20                       PN Due: 9/24/2019     Time In: 7:05a  Time Out: 7:55a  Total Time: 50 minutes  Total Billable Time: 50 minutes    Precautions: Standard, Diabetes and Fall    Subjective     Pt reports: He took his gabapentin this morning so he is not having pain or tingling in his legs right now. States he felt good after last session, his B LEs felt a little bit sore.     He was compliant with home exercise program.  Response to previous treatmen: no increased pain/discomfort  Functional change: none reported    Pain: 0/10  Location: bilateral legs      Objective     Abel received therapeutic exercises to develop strength, endurance and ROM for 25 minutes including:    Nustep 2x3 min - rest break in between 1.5 minutes  Bridges 2x10  LAQs 2x10  Seated toe raise 2x10  Hamstring Stretch 3x30 ea  Standing marching 2x10   Standing hamstring curls 2x10  Standing heel raise 2x10    Aebl participated in neuromuscular re-education activities to improve: Balance, Coordination and Proprioception for 25 minutes. The following activities were included:    Balance on firm surface:   Standing, feet together 3x30 ea   Standing feet together eyes closed 3x30 ea   Standing feet together horizontal/vertical head turns 3x30 ea   Tandem standing 3x30 ea  Foam surface:    Standing feet together, EC [id number]    Standing feet together horizontal/vertical  head turns 3x30 sec ea     Home Exercises Provided and Patient Education Provided     Education provided:   Cont to perform HEP as provided.     Written Home Exercises Provided: Patient instructed to cont prior HEP.  Exercises were reviewed and Abel was able to demonstrate them prior to the end of the session.  Abel demonstrated good  understanding of the education provided.     See EMR under Patient Instructions for exercises provided prior visit.    Assessment     Pt tolerated treatment well overall with no adverse affects. Pt able to complete tandem stance this session without UE support, however, requiring CGA for maintaining balance. Pt tolerated all exercises with minimal reported pain/tingling in B LEs secondary to taking gabapentin prior to therapy. Rest breaks taken frequently throughout exercise, pt reminded of importance of monitoring LE fatigue and adjusting activity as necessary.     Abel is progressing well towards his goals.   Pt prognosis is Good.     Pt will continue to benefit from skilled outpatient physical therapy to address the deficits listed in the problem list box on initial evaluation, provide pt/family education and to maximize pt's level of independence in the home and community environment.     Pt's spiritual, cultural and educational needs considered and pt agreeable to plan of care and goals.    Anticipated barriers to physical therapy: none    Goals:  Short Term Goals: 4 weeks   1. Pt will be independent with his HEP in order to demonstrate self-management of his condition. - progressing  2. Pt will demonstrate an increase in BLE muscle strength by 1/3 muscle grade in order to improve functional mobility. progressing  3. Pt will be able to perform 5 time sit to  20 seconds to demonstrate increased LE strength, coordination, and ease with transfers. progressing  4. Pt will demonstrate increased gait speed during 10 meter walk with safety and no LOB to 1.2m/s in order to  ambulate in community more efficiently. progressing  5. Pt will be able to walk 2 blocks with < or = to minimal difficulty in order to improve quality of life. progressing     Long Term Goals: 8 weeks   1. Pt will demonstrate in increase in BLE muscle strength by 1 muscle grade in order to improve functional mobility. progressing  3. Pt will be able to perform 5 time sit to  17 seconds to demonstrate increased LE strength, coordination, and ease with transfers. progressing  4. Pt will demonstrate increased gait speed during 10 meter walk with safety and no LOB to 1.3m/s in order to ambulate in community more efficiently. progressing  5. Pt will demonstrate an increased FGA score to > or = to 23/30 to demonstrate decrease risk of falls. progressing  6. Pt will be able to perform his usual work or housework activities with < or = to minimal difficulty in order to participate fully in his life. progressing    Plan     Certification date: 8/27/2019 - 11/27/2019    Cont skilled PT session towards PT and patient's goals. Continue progression of LE strengthening and balance as tolerated    Arabella Rainey, PT, DPT  09/12/2019

## 2019-09-13 ENCOUNTER — HOSPITAL ENCOUNTER (OUTPATIENT)
Dept: RADIOLOGY | Facility: HOSPITAL | Age: 54
Discharge: HOME | End: 2019-09-13
Attending: PSYCHIATRY & NEUROLOGY
Payer: COMMERCIAL

## 2019-09-13 DIAGNOSIS — R29.898 BILATERAL LEG WEAKNESS: ICD-10-CM

## 2019-09-13 DIAGNOSIS — R94.131 ABNORMAL EMG: ICD-10-CM

## 2019-09-13 PROCEDURE — 25500020 PHARM REV CODE 255: Performed by: PSYCHIATRY & NEUROLOGY

## 2019-09-13 PROCEDURE — 72158 MRI LUMBAR SPINE W/O & W/DYE: CPT | Mod: 26,,, | Performed by: RADIOLOGY

## 2019-09-13 PROCEDURE — 72156 MRI CERVICAL SPINE W WO CONTRAST: ICD-10-PCS | Mod: 26,,, | Performed by: RADIOLOGY

## 2019-09-13 PROCEDURE — 72158 MRI LUMBAR SPINE W/O & W/DYE: CPT | Mod: TC

## 2019-09-13 PROCEDURE — 72156 MRI NECK SPINE W/O & W/DYE: CPT | Mod: TC

## 2019-09-13 PROCEDURE — 72157 MRI CHEST SPINE W/O & W/DYE: CPT | Mod: TC

## 2019-09-13 PROCEDURE — A9585 GADOBUTROL INJECTION: HCPCS | Performed by: PSYCHIATRY & NEUROLOGY

## 2019-09-13 PROCEDURE — 72156 MRI NECK SPINE W/O & W/DYE: CPT | Mod: 26,,, | Performed by: RADIOLOGY

## 2019-09-13 PROCEDURE — 72157 MRI CHEST SPINE W/O & W/DYE: CPT | Mod: 26,,, | Performed by: RADIOLOGY

## 2019-09-13 PROCEDURE — 72157 MRI THORACIC SPINE W WO CONTRAST: ICD-10-PCS | Mod: 26,,, | Performed by: RADIOLOGY

## 2019-09-13 PROCEDURE — 72158 MRI LUMBAR SPINE W WO CONTRAST: ICD-10-PCS | Mod: 26,,, | Performed by: RADIOLOGY

## 2019-09-13 RX ORDER — GADOBUTROL 604.72 MG/ML
10 INJECTION INTRAVENOUS
Status: COMPLETED | OUTPATIENT
Start: 2019-09-13 | End: 2019-09-13

## 2019-09-13 RX ADMIN — GADOBUTROL 10 ML: 604.72 INJECTION INTRAVENOUS at 09:09

## 2019-09-16 ENCOUNTER — OFFICE VISIT (OUTPATIENT)
Dept: NEUROLOGY | Facility: CLINIC | Age: 54
End: 2019-09-16
Payer: COMMERCIAL

## 2019-09-16 ENCOUNTER — TELEPHONE (OUTPATIENT)
Dept: NEUROLOGY | Facility: CLINIC | Age: 54
End: 2019-09-16

## 2019-09-16 VITALS
HEART RATE: 104 BPM | DIASTOLIC BLOOD PRESSURE: 76 MMHG | HEIGHT: 75 IN | BODY MASS INDEX: 30.13 KG/M2 | SYSTOLIC BLOOD PRESSURE: 126 MMHG | WEIGHT: 242.31 LBS

## 2019-09-16 DIAGNOSIS — G62.9 POLYNEUROPATHY: Primary | ICD-10-CM

## 2019-09-16 PROCEDURE — 99214 OFFICE O/P EST MOD 30 MIN: CPT | Mod: S$GLB,,, | Performed by: PSYCHIATRY & NEUROLOGY

## 2019-09-16 PROCEDURE — 3008F PR BODY MASS INDEX (BMI) DOCUMENTED: ICD-10-PCS | Mod: CPTII,S$GLB,, | Performed by: PSYCHIATRY & NEUROLOGY

## 2019-09-16 PROCEDURE — 99999 PR PBB SHADOW E&M-EST. PATIENT-LVL IV: CPT | Mod: PBBFAC,,, | Performed by: PSYCHIATRY & NEUROLOGY

## 2019-09-16 PROCEDURE — 3008F BODY MASS INDEX DOCD: CPT | Mod: CPTII,S$GLB,, | Performed by: PSYCHIATRY & NEUROLOGY

## 2019-09-16 PROCEDURE — 99214 PR OFFICE/OUTPT VISIT, EST, LEVL IV, 30-39 MIN: ICD-10-PCS | Mod: S$GLB,,, | Performed by: PSYCHIATRY & NEUROLOGY

## 2019-09-16 PROCEDURE — 99999 PR PBB SHADOW E&M-EST. PATIENT-LVL IV: ICD-10-PCS | Mod: PBBFAC,,, | Performed by: PSYCHIATRY & NEUROLOGY

## 2019-09-16 NOTE — TELEPHONE ENCOUNTER
----- Message from Ivelisse Infante MA sent at 9/16/2019  9:33 AM CDT -----  Dr. Gil would like for patient to be seen within 2 weeks if possible.    Thanks,  XOCHITL Oviedo

## 2019-09-16 NOTE — PROGRESS NOTES
Neurology Follow Up Note    Chief Complaint: follow up MRI and EMG results    HPI:   Abel Morris is a 53 y.o. male with medical conditions as outlined below who presents for follow up for bilateral lower extremity paresthesias and weakness. He had an MRI full spine which showed degenerative changes without abnormal cord signal. He had an EMG/NCS which showed mild right carpal tunnel syndrome and a severe generalized polyneuropathy with active and chronic denervation in various muscles of the upper and lower extremities. He had absent lower extremity responses and conduction velocities in 30s in upper extremities. He feels his strength is improving a little bit with physical therapy. He is on gabapentin for paresthesias and states he is tolerating this well. He denies recent falls or bowel or bladder problems. He denies chest pain or shortness or breath. He admits to drinking 2-3 beers daily for many years. He states he has started to cut down on drinking recently. He has no further complaints.     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    History of colonic polyps 8/17/2018    Hyperlipidemia 11/28/2012    Poor compliance 1/8/2014       Past Surgical History:  Past Surgical History:   Procedure Laterality Date    COLONOSCOPY N/A 9/3/2019    Performed by [facility] Carmichael [facility] at [facility]    COLONOSCOPY N/A 8/2/2016    Performed by Miguel Persaud MD at [facility]       Social History:  Social History     Socioeconomic History    Marital status:      Spouse name: Not on file    Number of children: Not on file    Years of education: Not on file    Highest education level: Not on file   Occupational  History    Not on file   Social Needs    Financial resource strain: Somewhat hard    Food insecurity:     Worry: Never true     Inability: Never true    Transportation needs:     Medical: No     Non-medical: No   Tobacco Use    Smoking status: Current Every Day Smoker     Types: Cigars    Smokeless tobacco: Never Used   Substance and Sexual Activity    Alcohol use: Yes     Alcohol/week: 3.6 oz     Types: 6 Cans of beer per week     Frequency: 4 or more times a week     Drinks per session: 10 or more     Binge frequency: Daily or almost daily     Comment: per week    Drug use: No    Sexual activity: Not on file   Lifestyle    Physical activity:     Days per week: 4 days     Minutes per session: Not on file    Stress: Not at all   Relationships    Social connections:     Talks on phone: More than three times a week     Gets together: More than three times a week     Attends L[employer] service: Not on file     Active member of club or organization: Yes     Attends meetings of clubs or organizations: More than 4 times per year     Relationship status:    Other Topics Concern    Not on file   Social History Narrative    Not on file       Family History:  Family History   Problem Relation Age of Onset    No Known Problems Mother     Colon polyps Father 61    No Known Problems Sister     No Known Problems Brother     No Known Problems Maternal Aunt     No Known Problems Maternal Uncle     No Known Problems Paternal Aunt     No Known Problems Paternal Uncle     Diabetes Maternal Grandmother     No Known Problems Maternal Grandfather     No Known Problems Paternal Grandmother     No Known Problems Paternal Grandfather     Amblyopia Neg Hx     Blindness Neg Hx     Cancer Neg Hx     Cataracts Neg Hx     Glaucoma Neg Hx     Hypertension Neg Hx     Macular degeneration Neg Hx     Retinal detachment Neg Hx     Strabismus Neg Hx     Stroke Neg Hx     Thyroid disease Neg Hx   "      Medications:  Current Outpatient Medications   Medication Sig Dispense Refill    gabapentin (NEURONTIN) 300 MG capsule Take 1 capsule (300 mg total) by mouth 3 (three) times daily. 90 capsule 11    GEMFIBROZIL ORAL       glimepiride (AMARYL) 4 MG tablet Take 1 tablet (4 mg total) by mouth daily with breakfast. 90 tablet 0    insulin glargine, TOUJEO, (TOUJEO SOLOSTAR U-300 INSULIN) 300 unit/mL (1.5 mL) InPn pen Inject 40 units once daily 3 Syringe 0    metFORMIN (GLUCOPHAGE) 1000 MG tablet 1 Tablet Oral Twice a day 180 tablet 12    pen needle, diabetic (BD ULTRA-FINE SHORT PEN NEEDLE) 31 gauge x 5/16" Ndle Use 1x/day 100 each 3    polyethylene glycol (COLYTE) 240-22.72-6.72 -5.84 gram SolR TAKE 4000 MLS BY MOUTH ONCE FOR 1 DOSE  0    sildenafil (REVATIO) 20 mg Tab 1-5 pills at a time per day prn 30 tablet 11    vardenafil (LEVITRA) 20 MG tablet Half to one qd prn 10 tablet 12    pravastatin (PRAVACHOL) 40 MG tablet Take 1 tablet (40 mg total) by mouth once daily. 90 tablet 12     No current facility-administered medications for this visit.        Allergies:  Review of patient's allergies indicates:  No Known Allergies    ROS:  A 12 point review of system was negative aside from pertinent positives and negatives as outlined above.    Physical Exam  Vitals:    09/16/19 0813   BP: 126/76   Pulse: 104       General: well nourished, well developed  Eyes: no scleral icterus   Nose: nasal turbinates intact  Neck: supple, ROM intact  Skin: no rash or ecchymosis  Joints: no swelling or erythema  Cardiac: regular rate and rhythm  Lungs: clear to auscultation bilaterally    Neuro:  Mental status: AAO x 3, no dysarthria, no aphasia, communicating appropriately  CN: PERRL, EOMI, VFF, V1-V3 sensation intact, no facial asymmetry, hearing grossly intact, tongue midline  Motor:   Deltoid R 5/5 L 5/5   Biceps R 5/5 L 5/5   Triceps R 5/5 L 5/5   Wrist flexion R 5/5 L 5/5   Wrist extension R 5/5 L 5/5   Finger abduction "  R 5/5 L 5/5   Thumb abduction R 5/5 L 5/5      Hip flexion R 4+/5 L 5/5   Knee extension R 5/5 L 5/5   Knee flexion R 5/5 L 5/5   Foot dorsiflexion R 5/5 L 5/5   Foot plantar flexion R 5/5 L 5/5   Foot inversion R 5/5 L 5/5   Foot eversion R 5/5 L 5/5         Normal bulk and tone     Reflexes: absent ankle and knee jerks, 1+ biceps and brachioradialis bilaterally, toes equivocal bilaterally  Sensory: decreased to pinprick up to mid shin bilaterally, position sense intact bilaterally, vibration sense decreased at toes  Coordination: no dysmetria on FTN  Gait: mildly unsteady    Prior Imaging/Labs:  Reviewed and discussed with patient      Assessment and Plan:    53 y.o. male with paresthesias in feet and unsteady gait 2/2 severe generalized polyneuropathy with active and chronic denervation on EMG/NCS. It is possible his neuropathy is 2/2 diabetes vs alcohol use, however, given onset of symptoms only 7 months ago CIDP or an autoimmune neuropathy remains in the differential.    1. Polyneuropathy  C/w gabapentin  - FL Lumbar Puncture (xpd); Future  - Gram stain; Future  - CSF culture; Future  - Protein, CSF  - Glucose, CSF; Future  - CSF cell count with differential; Future  - Ambulatory Referral to Dr. Pelayo, neuromuscular specialist for further evaluation            Patient was advised to notify me for worsening symptoms. I will call patient with LP results and see him back as needed following evaluation with Dr. Pierce Barth DO  [facility]  [address] [facility] [address]  [place] [id number] [phone]

## 2019-09-16 NOTE — TELEPHONE ENCOUNTER
Called pt and scheduled polyneuropathy consult with Dr. Pelayo; date/time/location confirmed; verbalized understanding. Pt to receive appt updates via [facility].

## 2019-09-17 RX ORDER — INSULIN GLARGINE 300 [IU]/ML
INJECTION, SOLUTION SUBCUTANEOUS
Qty: 3 SYRINGE | Refills: 0 | Status: SHIPPED | OUTPATIENT
Start: 2019-09-17 | End: 2021-02-05 | Stop reason: SDUPTHER

## 2019-09-19 ENCOUNTER — HOSPITAL ENCOUNTER (OUTPATIENT)
Facility: HOSPITAL | Age: 54
Discharge: HOME | End: 2019-09-19
Attending: RADIOLOGY | Admitting: RADIOLOGY
Payer: COMMERCIAL

## 2019-09-19 VITALS
SYSTOLIC BLOOD PRESSURE: 129 MMHG | TEMPERATURE: 98 F | HEART RATE: 90 BPM | DIASTOLIC BLOOD PRESSURE: 83 MMHG | OXYGEN SATURATION: 98 % | RESPIRATION RATE: 18 BRPM

## 2019-09-19 DIAGNOSIS — G62.9 POLYNEUROPATHY: ICD-10-CM

## 2019-09-19 LAB
CLARITY CSF: CLEAR
COLOR CSF: COLORLESS
GLUCOSE CSF-MCNC: 114 MG/DL (ref 40–70)
PROT CSF-MCNC: 72 MG/DL (ref 15–40)
RBC # CSF: 4 /CU MM
SPECIMEN VOL CSF: 2 ML
WBC # CSF: 1 /CU MM (ref 0–5)

## 2019-09-19 PROCEDURE — 84157 ASSAY OF PROTEIN OTHER: CPT

## 2019-09-19 PROCEDURE — 82945 GLUCOSE OTHER FLUID: CPT

## 2019-09-19 PROCEDURE — 89051 BODY FLUID CELL COUNT: CPT

## 2019-09-19 PROCEDURE — 87205 SMEAR GRAM STAIN: CPT

## 2019-09-19 PROCEDURE — 87070 CULTURE OTHR SPECIMN AEROBIC: CPT

## 2019-09-19 NOTE — H&P
[facility]  History & Physical - Short Stay  Interventional Radiology    SUBJECTIVE:     Chief Complaint/Reason for Admission: Polyneuropathy    Informant(s):  self and Electronic Health Record    History of Present Illness:  Abel Morris is a 53 y.o. male with a history of polyneuropathy.    Patient presents for fluoro guided LP.    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    History of colonic polyps 8/17/2018    Hyperlipidemia 11/28/2012    Poor compliance 1/8/2014     Past Surgical History:   Procedure Laterality Date    COLONOSCOPY N/A 9/3/2019    Performed by Álvaro Carmichael [facility] at [facility]    COLONOSCOPY N/A 8/2/2016    Performed by Miguel Persaud MD at [facility] ENDO     Family History   Problem Relation Age of Onset    No Known Problems Mother     Colon polyps Father 61    No Known Problems Sister     No Known Problems Brother     No Known Problems Maternal Aunt     No Known Problems Maternal Uncle     No Known Problems Paternal Aunt     No Known Problems Paternal Uncle     Diabetes Maternal Grandmother     No Known Problems Maternal Grandfather     No Known Problems Paternal Grandmother     No Known Problems Paternal Grandfather     Amblyopia Neg Hx     Blindness Neg Hx     Cancer Neg Hx     Cataracts Neg Hx     Glaucoma Neg Hx     Hypertension Neg Hx     Macular degeneration Neg Hx     Retinal detachment Neg Hx     Strabismus Neg Hx     Stroke Neg Hx     Thyroid disease Neg Hx      Social History     Tobacco Use    Smoking status: Current Every Day Smoker      Types: Cigars    Smokeless tobacco: Never Used   Substance Use Topics    Alcohol use: Yes     Alcohol/week: 3.6 oz     Types: 6 Cans of beer per week     Frequency: 4 or more times a week     Drinks per session: 10 or more     Binge frequency: Daily or almost daily     Comment: per week    Drug use: No        Review of Systems:  ROS not obtained    OBJECTIVE:     Vital Signs (Most Recent):       Physical Exam:  Lungs: No respiratory distress  Cardiac: regular rate and rhythm  Aleret and oriented    Laboratory  CBC:   Lab Results   Component Value Date/Time    WBC 4.35 08/14/2019 08:55 AM    RBC 5.24 08/14/2019 08:55 AM    HGB 16.5 08/14/2019 08:55 AM    HCT 45.5 08/14/2019 08:55 AM     08/14/2019 08:55 AM    MCV 87 08/14/2019 08:55 AM    MCH 31.5 (H) 08/14/2019 08:55 AM    MCHC 36.3 (H) 08/14/2019 08:55 AM           ASSESSMENT/PLAN:     Polyneuropathy.    Patient will undergo LP.      Sedation Plan: Lidocaine local only

## 2019-09-19 NOTE — DISCHARGE INSTRUCTIONS
BATHING:  ? You may shower tomorrow.  DRESSING:  ? Remove dressing tomorrow.        ACTIVITY LEVEL: If you have received sedation or an anesthetic, you may feel sleepy for several hours. Rest until you are more awake. Gradually resume your normal activities    Do not drive, drink alcohol, or sign legal documents for 24 hours, or if taking narcotic pain medication.      DIET: You may resume your home diet. If nausea is present, increase your diet gradually with fluids and bland foods.    Medications: Pain medication should be taken only if needed and as directed. If antibiotics are prescribed, the medication should be taken until completed. You will be given an updated list of you medications.  ? No driving, alcoholic beverages or signing legal documents for next 24 hours if you have had sedation, or while taking pain medication    CALL THE DOCTOR:   For any obvious bleeding (some dried blood over the incision is normal).     Redness, swelling, foul smell around incision or fever over 101.  Shortness of breath.  Persistent pain or nausea not relieved by medication.  Call  [phone]     to speak with an Interventional Radiologist    If any unusual problems or difficulties occur contact your doctor. If you cannot contact your doctor but feel your signs and symptoms warrant a physicians attention return to the emergency room.       Fall Prevention  Millions of people fall every year and injure themselves. You may have had anesthesia or sedation which may increase your risk of falling. You may have health issues that put you at an increased risk of falling.     Here are ways to reduce your risk of falling.  ·   · Make your home safe by keeping walkways clear of objects you may trip over.  · Use non-slip pads under rugs. Do not use area rugs or small throw rugs.  · Use non-slip mats in bathtubs and showers.  · Install handrails and lights on staircases.  · Do not walk in poorly lit areas.  · Do not stand on chairs or  wobbly ladders.  · Use caution when reaching overhead or looking upward. This position can cause a loss of balance.  · Be sure your shoes fit properly, have non-slip bottoms and are in good condition.   · Wear shoes both inside and out. Avoid going barefoot or wearing slippers.  · Be cautious when going up and down stairs, curbs, and when walking on uneven sidewalks.  · If your balance is poor, consider using a cane or walker.  · If your fall was related to alcohol use, stop or limit alcohol intake.   · If your fall was related to use of sleeping medicines, talk to your doctor about this. You may need to reduce your dosage at bedtime if you awaken during the night to go to the bathroom.    · To reduce the need for nighttime bathroom trips:  ¨ Avoid drinking fluids for several hours before going to bed  ¨ Empty your bladder before going to bed  ¨ Men can keep a urinal at the bedside  · Stay as active as you can. Balance, flexibility, strength, and endurance all come from exercise. They all play a role in preventing falls. Ask your healthcare provider which types of activity are right for you.  · Get your vision checked on a regular basis.  · If you have pets, know where they are before you stand up or walk so you don't trip over them.  · Use night lights.

## 2019-09-19 NOTE — OP NOTE
[facility]  Interventional Radiology  High Risk Procedure - Outpatient    Date: 09/19/2019 Time: 10:03 AM    Pre-Op Diagnosis: Polyneuropathy    Post-Op Diagnosis: same    Procedure Performed by: Jose Guadalupe Callahan MD    Assistant: none    Procedure: Fluoro guided LP    Specimen/Tissue Removed: 8 mL of crystal clear CSF    Estimated Blood Loss: less than 5 mL    Procedure Note/Findings: Fluoro guided LP performed at L4  Level. No immediate post-procedure complications noted            Please refer to dictated report for additional details.

## 2019-09-19 NOTE — DISCHARGE SUMMARY
Radiology Discharge Summary      Admit date: 9/19/2019  8:08 AM  Discharge date: September 19, 2019    Instructions Given to patient: YesVerbal    Diet: Regular    Activity:Restriction as listed: No strenuous activities for 48 hours    Medications on discharge (List): Refer to Discharge Medication List    Hospital Course: Fluoro guided LP    Description of Condition on Discharge: stable    Discharge Disposition: Home    Discharge Diagnosis: polyneuropathy

## 2019-09-20 ENCOUNTER — TELEPHONE (OUTPATIENT)
Dept: NEUROLOGY | Facility: CLINIC | Age: 54
End: 2019-09-20

## 2019-09-20 NOTE — TELEPHONE ENCOUNTER
Spoke patient and notified him of spinal tap results. His CSF protein is 72. This could be due to diabetes vs an autoimmune neuropathy. His symptoms have remained stable. He has an appointment with Dr. Pelayo, neuromuscular specialist, on October 21 for opinion on cause of neuropathy and lower extremity weakness. He was made aware of the importance of keeping this appointment. He was advised to notify me if he experiences worsening symptoms prior to this appointment. He demonstrated understanding and all questions were answered.    Arlene Barth DO

## 2019-09-20 NOTE — TELEPHONE ENCOUNTER
Called patient to discuss spinal tap results. Left a message for return call. Will discuss upon return call.    Arlene Barth DO

## 2019-09-21 ENCOUNTER — PATIENT OUTREACH (OUTPATIENT)
Dept: ADMINISTRATIVE | Facility: OTHER | Age: 54
End: 2019-09-21

## 2019-09-22 LAB
BACTERIA CSF CULT: NO GROWTH
GRAM STN SPEC: NORMAL

## 2019-09-24 ENCOUNTER — DOCUMENTATION ONLY (OUTPATIENT)
Dept: REHABILITATION | Facility: HOSPITAL | Age: 54
End: 2019-09-24

## 2019-09-24 NOTE — PROGRESS NOTES
Pt did not attend his physical therapy treatment today, 9/24/2019. Plan to perform re-assessment next visit treatment.

## 2019-09-25 ENCOUNTER — CLINICAL SUPPORT (OUTPATIENT)
Dept: DIABETES | Facility: CLINIC | Age: 54
End: 2019-09-25
Payer: COMMERCIAL

## 2019-09-25 ENCOUNTER — OFFICE VISIT (OUTPATIENT)
Dept: ENDOCRINOLOGY | Facility: CLINIC | Age: 54
End: 2019-09-25
Payer: COMMERCIAL

## 2019-09-25 ENCOUNTER — OFFICE VISIT (OUTPATIENT)
Dept: FAMILY MEDICINE | Facility: CLINIC | Age: 54
End: 2019-09-25
Payer: COMMERCIAL

## 2019-09-25 VITALS
DIASTOLIC BLOOD PRESSURE: 70 MMHG | WEIGHT: 240.06 LBS | HEART RATE: 90 BPM | HEIGHT: 75 IN | TEMPERATURE: 98 F | BODY MASS INDEX: 29.85 KG/M2 | SYSTOLIC BLOOD PRESSURE: 122 MMHG | OXYGEN SATURATION: 98 %

## 2019-09-25 VITALS
HEART RATE: 96 BPM | WEIGHT: 240.38 LBS | SYSTOLIC BLOOD PRESSURE: 110 MMHG | DIASTOLIC BLOOD PRESSURE: 70 MMHG | BODY MASS INDEX: 30.05 KG/M2

## 2019-09-25 DIAGNOSIS — E11.65 UNCONTROLLED TYPE 2 DIABETES MELLITUS WITH HYPERGLYCEMIA: Primary | ICD-10-CM

## 2019-09-25 DIAGNOSIS — R53.1 DECREASED STRENGTH, ENDURANCE, AND MOBILITY: ICD-10-CM

## 2019-09-25 DIAGNOSIS — E11.42 DIABETIC POLYNEUROPATHY ASSOCIATED WITH TYPE 2 DIABETES MELLITUS: ICD-10-CM

## 2019-09-25 DIAGNOSIS — Z78.9 ALCOHOL USE: ICD-10-CM

## 2019-09-25 DIAGNOSIS — E78.5 HYPERLIPIDEMIA, UNSPECIFIED HYPERLIPIDEMIA TYPE: ICD-10-CM

## 2019-09-25 DIAGNOSIS — R68.89 DECREASED STRENGTH, ENDURANCE, AND MOBILITY: ICD-10-CM

## 2019-09-25 DIAGNOSIS — E11.65 UNCONTROLLED TYPE 2 DIABETES MELLITUS WITH HYPERGLYCEMIA: ICD-10-CM

## 2019-09-25 DIAGNOSIS — E11.42 DIABETIC POLYNEUROPATHY ASSOCIATED WITH TYPE 2 DIABETES MELLITUS: Primary | ICD-10-CM

## 2019-09-25 DIAGNOSIS — Z74.09 DECREASED STRENGTH, ENDURANCE, AND MOBILITY: ICD-10-CM

## 2019-09-25 DIAGNOSIS — R26.89 IMPAIRMENT OF BALANCE: ICD-10-CM

## 2019-09-25 DIAGNOSIS — G62.9 POLYNEUROPATHY: ICD-10-CM

## 2019-09-25 DIAGNOSIS — R26.9 GAIT DIFFICULTY: ICD-10-CM

## 2019-09-25 PROCEDURE — 3008F PR BODY MASS INDEX (BMI) DOCUMENTED: ICD-10-PCS | Mod: CPTII,S$GLB,, | Performed by: PHYSICIAN ASSISTANT

## 2019-09-25 PROCEDURE — 99999 PR PBB SHADOW E&M-EST. PATIENT-LVL IV: CPT | Mod: PBBFAC,,, | Performed by: PHYSICIAN ASSISTANT

## 2019-09-25 PROCEDURE — 3046F PR MOST RECENT HEMOGLOBIN A1C LEVEL > 9.0%: ICD-10-PCS | Mod: CPTII,S$GLB,, | Performed by: NURSE PRACTITIONER

## 2019-09-25 PROCEDURE — 3046F PR MOST RECENT HEMOGLOBIN A1C LEVEL > 9.0%: ICD-10-PCS | Mod: CPTII,S$GLB,, | Performed by: PHYSICIAN ASSISTANT

## 2019-09-25 PROCEDURE — G0108 DIAB MANAGE TRN  PER INDIV: HCPCS | Mod: S$GLB,,, | Performed by: DIETITIAN, REGISTERED

## 2019-09-25 PROCEDURE — 99999 PR PBB SHADOW E&M-EST. PATIENT-LVL IV: ICD-10-PCS | Mod: PBBFAC,,, | Performed by: PHYSICIAN ASSISTANT

## 2019-09-25 PROCEDURE — 99214 PR OFFICE/OUTPT VISIT, EST, LEVL IV, 30-39 MIN: ICD-10-PCS | Mod: S$GLB,,, | Performed by: PHYSICIAN ASSISTANT

## 2019-09-25 PROCEDURE — 99999 PR PBB SHADOW E&M-EST. PATIENT-LVL IV: CPT | Mod: PBBFAC,,, | Performed by: NURSE PRACTITIONER

## 2019-09-25 PROCEDURE — 99214 OFFICE O/P EST MOD 30 MIN: CPT | Mod: S$GLB,,, | Performed by: PHYSICIAN ASSISTANT

## 2019-09-25 PROCEDURE — 3008F BODY MASS INDEX DOCD: CPT | Mod: CPTII,S$GLB,, | Performed by: NURSE PRACTITIONER

## 2019-09-25 PROCEDURE — 3008F PR BODY MASS INDEX (BMI) DOCUMENTED: ICD-10-PCS | Mod: CPTII,S$GLB,, | Performed by: NURSE PRACTITIONER

## 2019-09-25 PROCEDURE — 99214 PR OFFICE/OUTPT VISIT, EST, LEVL IV, 30-39 MIN: ICD-10-PCS | Mod: S$GLB,,, | Performed by: NURSE PRACTITIONER

## 2019-09-25 PROCEDURE — 3008F BODY MASS INDEX DOCD: CPT | Mod: CPTII,S$GLB,, | Performed by: PHYSICIAN ASSISTANT

## 2019-09-25 PROCEDURE — 99999 PR PBB SHADOW E&M-EST. PATIENT-LVL IV: ICD-10-PCS | Mod: PBBFAC,,, | Performed by: NURSE PRACTITIONER

## 2019-09-25 PROCEDURE — 3046F HEMOGLOBIN A1C LEVEL >9.0%: CPT | Mod: CPTII,S$GLB,, | Performed by: NURSE PRACTITIONER

## 2019-09-25 PROCEDURE — 99214 OFFICE O/P EST MOD 30 MIN: CPT | Mod: S$GLB,,, | Performed by: NURSE PRACTITIONER

## 2019-09-25 PROCEDURE — G0108 PR DIAB MANAGE TRN  PER INDIV: ICD-10-PCS | Mod: S$GLB,,, | Performed by: DIETITIAN, REGISTERED

## 2019-09-25 PROCEDURE — 3046F HEMOGLOBIN A1C LEVEL >9.0%: CPT | Mod: CPTII,S$GLB,, | Performed by: PHYSICIAN ASSISTANT

## 2019-09-25 RX ORDER — INSULIN PUMP SYRINGE, 3 ML
EACH MISCELLANEOUS
Qty: 1 EACH | Refills: 0 | Status: SHIPPED | OUTPATIENT
Start: 2019-09-25 | End: 2023-08-30

## 2019-09-25 RX ORDER — LANCETS
EACH MISCELLANEOUS
Qty: 300 EACH | Refills: 3 | Status: SHIPPED | OUTPATIENT
Start: 2019-09-25 | End: 2021-03-05 | Stop reason: SDUPTHER

## 2019-09-25 RX ORDER — ASPIRIN 81 MG/1
81 TABLET ORAL DAILY
Refills: 0 | COMMUNITY
Start: 2019-09-25 | End: 2023-08-30

## 2019-09-25 NOTE — ASSESSMENT & PLAN NOTE
A1c improving, now on insulin therapy and followed by endo  Continue current treatment plan  Keep follow up appointment with neuromuscular specialist   Will refer to podiatry for foot exam

## 2019-09-25 NOTE — PROGRESS NOTES
Patient Name: Abel Morris    : 1965  MRN: [id number]    Subjective:  Abel is a 53 y.o. male who presents today for:    Chief Complaint   Patient presents with    return to work paperwork       HPI  Patient has multiple medical diagnoses as listed below in the history. Patient is new to me, but known to the clinic. He presents for evaluation to return to work on 10/19. He has forms to be completed by PCP. He has been out of work for >1 month due to neuropathy and bilateral LE weakness. He was initially seen by PCP on 19. He has been worked up by orthopedics and neurology with MRI of spine and recent LP. Thus far, work up has been unremarkable. The patient does have history of uncontrolled diabetes with A1c average of 10%. He has recent seen endocrinology and has been placed on insulin injection once a day. He is taking gabapentin with good effect. He has been doing PT to aid in his stability and strength which he reports to be helpful. He does have a follow up with Dr. Pelayo, neuromuscular specialist, on 10/21 for second opinion. He reports improvement of symptoms. He reports his balance to have improved and the burning sensation to resolve. He does have numbness in the feet. He has yet to follow up with podiatry. No fever, SOB, CP, bowel/bladder incontinence or saddle anesthesia. He has no other acute complaints today and is otherwise healthy.      Past Medical History  Past Medical History:   Diagnosis Date    Arthritis     Diabetes mellitus type II, uncontrolled 2012    Diabetes mellitus with neurological manifestations, uncontrolled 2014    ED (erectile dysfunction) 2012    Eye injury     os hit broken orbital bone     HDL lipoprotein deficiency 2014    History of colonic polyps 2018    Hyperlipidemia 2012    Poor compliance 2014       Past Surgical History  Past Surgical History:   Procedure Laterality Date    COLONOSCOPY N/A 2016     Procedure: COLONOSCOPY;  Surgeon: Miguel Persaud MD;  Location: [facility] ENDO;  Service: Endoscopy;  Laterality: N/A;    COLONOSCOPY N/A 9/3/2019    Procedure: COLONOSCOPY;  Surgeon: Álvaro Carmichael MD;  Location: [facility] ENDO;  Service: Endoscopy;  Laterality: N/A;  confirmed appt- sp       Family History  Family History   Problem Relation Age of Onset    No Known Problems Mother     Colon polyps Father 61    No Known Problems Sister     No Known Problems Brother     No Known Problems Maternal Aunt     No Known Problems Maternal Uncle     No Known Problems Paternal Aunt     No Known Problems Paternal Uncle     Diabetes Maternal Grandmother     No Known Problems Maternal Grandfather     No Known Problems Paternal Grandmother     No Known Problems Paternal Grandfather     Amblyopia Neg Hx     Blindness Neg Hx     Cancer Neg Hx     Cataracts Neg Hx     Glaucoma Neg Hx     Hypertension Neg Hx     Macular degeneration Neg Hx     Retinal detachment Neg Hx     Strabismus Neg Hx     Stroke Neg Hx     Thyroid disease Neg Hx        Social History  Social History     Socioeconomic History    Marital status:      Spouse name: Not on file    Number of children: Not on file    Years of education: Not on file    Highest education level: Not on file   Occupational History    Not on file   Social Needs    Financial resource strain: Somewhat hard    Food insecurity:     Worry: Never true     Inability: Never true    Transportation needs:     Medical: No     Non-medical: No   Tobacco Use    Smoking status: Current Every Day Smoker     Types: Cigars    Smokeless tobacco: Never Used   Substance and Sexual Activity    Alcohol use: Yes     Alcohol/week: 6.0 standard drinks     Types: 6 Cans of beer per week     Frequency: 4 or more times a week     Drinks per session: 10 or more     Binge frequency: Daily or almost daily     Comment: per week    Drug use: No    Sexual activity: Not on file  "  Lifestyle    Physical activity:     Days per week: 4 days     Minutes per session: Not on file    Stress: Not at all   Relationships    Social connections:     Talks on phone: More than three times a week     Gets together: More than three times a week     Attends Latter day service: Not on file     Active member of club or organization: Yes     Attends meetings of clubs or organizations: More than 4 times per year     Relationship status:    Other Topics Concern    Not on file   Social History Narrative    Not on file       Current Medications  Current Outpatient Medications on File Prior to Visit   Medication Sig Dispense Refill    gabapentin (NEURONTIN) 300 MG capsule Take 1 capsule (300 mg total) by mouth 3 (three) times daily. 90 capsule 11    GEMFIBROZIL ORAL       glimepiride (AMARYL) 4 MG tablet Take 1 tablet (4 mg total) by mouth daily with breakfast. 90 tablet 0    insulin glargine, TOUJEO, (TOUJEO SOLOSTAR U-300 INSULIN) 300 unit/mL (1.5 mL) InPn pen Inject 40 units once daily 3 Syringe 0    metFORMIN (GLUCOPHAGE) 1000 MG tablet 1 Tablet Oral Twice a day 180 tablet 12    pen needle, diabetic (BD ULTRA-FINE SHORT PEN NEEDLE) 31 gauge x 5/16" Ndle Use 1x/day 100 each 3    polyethylene glycol (COLYTE) 240-22.72-6.72 -5.84 gram SolR TAKE 4000 MLS BY MOUTH ONCE FOR 1 DOSE  0    pravastatin (PRAVACHOL) 40 MG tablet Take 1 tablet (40 mg total) by mouth once daily. 90 tablet 12    sildenafil (REVATIO) 20 mg Tab 1-5 pills at a time per day prn 30 tablet 11    vardenafil (LEVITRA) 20 MG tablet Half to one qd prn 10 tablet 12     No current facility-administered medications on file prior to visit.        Allergies   Review of patient's allergies indicates:  No Known Allergies      ROS  Review of Systems   Constitutional: Negative for fatigue and fever.   Respiratory: Negative for shortness of breath.    Cardiovascular: Negative for chest pain and leg swelling.   Musculoskeletal: Negative for " "gait problem, myalgias and neck stiffness.   Neurological: Positive for numbness. Negative for dizziness, syncope, weakness and light-headedness.         Objective:    /70 (BP Location: Right arm, Patient Position: Sitting, BP Method: Large (Manual))   Pulse 90   Temp 98 °F (36.7 °C) (Oral)   Ht 6' 3" (1.905 m)   Wt 108.9 kg (240 lb 1.3 oz)   SpO2 98%   BMI 30.01 kg/m²     Physical Exam   Constitutional: Vital signs are normal.   Cardiovascular: Normal rate and regular rhythm.   Pulses:       Dorsalis pedis pulses are 2+ on the right side, and 2+ on the left side.   Pulmonary/Chest: Effort normal and breath sounds normal.   Musculoskeletal:        Right hip: Normal.        Left hip: Normal.        Right knee: Normal.        Left knee: Normal.        Right ankle: Normal.        Left ankle: Normal.   Neurological: He is alert. He has normal strength. No cranial nerve deficit.       Assessment/Plan:  Abel Morris is a 53 y.o. male who presents today for :    Abel was seen today for return to work paperwork.    Diagnoses and all orders for this visit:    Diabetic polyneuropathy associated with type 2 diabetes mellitus  -     Ambulatory referral to Podiatry    I have reviewed forms for return to work and will defer to PCP, Dr Starkey as he has followed patient's case. I informed patient this may take 2-3 business days to complete. We will call the patient when forms are complete.       Problem list issues addressed during this visit    Diabetic polyneuropathy associated with type 2 diabetes mellitus  A1c improving, now on insulin therapy and followed by alka,   Counseled at length on the importance of good diabetes control and better dietary options  Continue current treatment plan  Additionally followed by neurology  Keep follow up appointment with neuromuscular specialist for continued work up for neuropathies   Will refer to podiatry for foot exam    Polyneuropathy   As above       Diabetes " mellitus type II, uncontrolled  As above      Decreased strength, endurance, and mobility  Improving, continue with PT    Gait difficulty  As above      Impairment of balance  As above         Health maintenance reviewed and discussed, declined vaccines today      I spent >50% of this 25 minute encounter counseling the patient on diagnoses, risk factors, and treatments.         Encouraged to call/return to clinic if symptoms persist or worsen    Marilin Villasenor PA-C  [facility] Family [facility]/ Internal Med

## 2019-09-25 NOTE — PROGRESS NOTES
CC: This 53 y.o. Black or  male  is here for evaluation of  T2DM along with comorbidities indicated in the Visit Diagnosis section of this encounter.    HPI: Abel Morris was diagnosed with T2DM in ~ late 20s to early 30s.       Initial visit on 8/19/19  New to Endocrine. Referred by Dr. Starkey.   Patient has developed loss of sensation to his feet.   Has not been taking glimepiride for maybe a year now.   Plan Continue metformin twice daily.   Restart glimepiride 4 mg ONCE daily with breakfast.   Should eat 3 meals/day and adopt regular day and night schedule. Avoid daytime naps.   Start Toujeo insulin at 20 units once daily.   See Diabetes Educator/Registered Dietician for Medical Nutrition Therapy and insulin training.   Test blood sugar 2x/day. - fasting and also before dinner/bedtime.   Return to clinic in 4 weeks.     Interval history  He has been following with Neurology for neuropathy. Gabapentin has helped with burning but still suffers from numbness to both legs/feet.   He has gained 9 lb since last visit.   He has found his visits with diabetes educator helpful. Has added more fruits and veggies to his diet. May be challenging to maintain healthy diet when he goes back to work end of October.     Has cut down alcohol intake - may drink 3 beers every other day. Previously drank 6-10 beers/day.     LAST DIABETES EDUCATION: 8/21/19 and again 9/25/19      HOSPITALIZED FOR DIABETES-  No.    PRESCRIBED DIABETES MEDICATIONS: metformin 1000 mg bid, glimepiride 4 mg once daily, Toujeo 20 units once daily      Misses medication doses - no     DM COMPLICATIONS: retinopathy and peripheral neuropathy    SIGNIFICANT DIABETES MED HISTORY: denies     SELF MONITORING BLOOD GLUCOSE: Checks blood glucose 2-4x/day.               HYPOGLYCEMIC EPISODES: n/a      CURRENT DIET:  Eats 3 meals/day. No overnight meals anymore.       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 Poor coordination of legs - improving with physical therapy         PHYSICAL EXAM:  GENERAL: Well developed, well nourished. No acute distress.   PSYCH: AAOx3, appropriate mood and affect, conversant, casually-groomed. Judgement and insight good. Answers tend to be vague.   NEURO: Cranial nerves grossly intact. Speech clear, no tremor.   CHEST: Respirations even and unlabored.       Hemoglobin A1C   Date Value Ref Range Status   08/14/2019 9.2 (H) 4.0 - 5.6 % Final     Comment:     ADA Screening Guidelines:  5.7-6.4%  Consistent with prediabetes  >or=6.5%  Consistent with diabetes  High levels of fetal hemoglobin interfere with the HbA1C  assay. Heterozygous hemoglobin variants (HbS, HgC, etc)do  not significantly interfere with this assay.   However, presence of multiple variants may affect accuracy.     06/13/2019 10.8 (H) 4.0 - 5.6 % Final     Comment:     ADA Screening Guidelines:  5.7-6.4%  Consistent with prediabetes  >or=6.5%  Consistent with diabetes  High levels of fetal hemoglobin interfere with the HbA1C  assay. Heterozygous hemoglobin variants (HbS, HgC, etc)do  not significantly interfere with this assay.   However, presence of multiple variants may affect accuracy.     06/13/2019 10.8 (H) 4.0 - 5.6 % Final     Comment:     ADA Screening Guidelines:  5.7-6.4%  Consistent with prediabetes  >or=6.5%  Consistent with diabetes  High levels of fetal hemoglobin interfere with the HbA1C  assay. Heterozygous hemoglobin variants (HbS, HgC, etc)do  not significantly interfere with this assay.   However, presence of multiple variants may affect accuracy.             Chemistry        Component Value Date/Time     (L)  08/14/2019 0855    K 4.6 08/14/2019 0855    CL 98 08/14/2019 0855    CO2 24 08/14/2019 0855    BUN 15 08/14/2019 0855    CREATININE 1.4 08/14/2019 0855     (H) 08/14/2019 0855        Component Value Date/Time    CALCIUM 10.1 08/14/2019 0855    ALKPHOS 68 08/14/2019 0855    AST 17 08/14/2019 0855    ALT 24 08/14/2019 0855    BILITOT 0.4 08/14/2019 0855    ESTGFRAFRICA >60.0 08/14/2019 0855    EGFRNONAA 57.4 (A) 08/14/2019 0855          Lab Results   Component Value Date    LDLCALC 136.6 06/13/2019          Ref. Range 6/13/2019 08:57   Cholesterol Latest Ref Range: 120 - 199 mg/dL 212 (H)   HDL Latest Ref Range: 40 - 75 mg/dL 43   Hdl/Cholesterol Ratio Latest Ref Range: 20.0 - 50.0 % 20.3   LDL Cholesterol External Latest Ref Range: 63.0 - 159.0 mg/dL 136.6   Non-HDL Cholesterol Latest Units: mg/dL 169   Total Cholesterol/HDL Ratio Latest Ref Range: 2.0 - 5.0  4.9   Triglycerides Latest Ref Range: 30 - 150 mg/dL 162 (H)       Lab Results   Component Value Date    MICALBCREAT 4.7 08/02/2018               ASSESSMENT and PLAN:    A1C GOAL: < 7 %       1. Type 2 diabetes, uncontrolled, with neuropathy  BGs mostly at goal. Praised pt on progress.     Continue metformin twice daily and Toujeo 20 units once daily.   Since a meal schedule may be difficult to maintain once you're back at work, will stop glimepiride due to risk of hypoglycemia.   Start Trulicity 0.75 mg once weekly. Reviewed s/e and risks.   If blood sugars are higher than 150 consistently after being on it for a few weeks, then call office and higher dose of Trulicity will be ordered for you.     Test blood sugar 2-3x/day.     Return to clinic in 2 months       Hemoglobin A1c    Lipid panel    Microalbumin/creatinine urine ratio    Hepatic function panel   2. Hyperlipidemia, unspecified hyperlipidemia type  Lipid panel    Hepatic function panel   3. Alcohol use  Advised him re: risk of pancreatitis with Trulicity. Reviewed symptoms of pancreatitis.   Needs to avoid heavy alcohol use.        Orders Placed This Encounter   Procedures    Hemoglobin A1c     Standing Status:   Future     Standing Expiration Date:   11/23/2020    Lipid panel     Standing Status:   Future     Standing Expiration Date:   9/24/2020    Microalbumin/creatinine urine ratio     Standing Status:   Future     Standing Expiration Date:   11/23/2020     Order Specific Question:   Specimen Source     Answer:   Urine    Hepatic function panel     Standing Status:   Future     Standing Expiration Date:   11/23/2020        Follow up in about 2 months (around 11/25/2019).

## 2019-09-25 NOTE — PATIENT INSTRUCTIONS
Continue metformin twice daily and Toujeo 20 units once daily.   Since a meal schedule may be difficult to maintain once you're back at work, will stop glimepiride due to risk of hypoglycemia.   Start Trulicity 0.75 mg once weekly.   If blood sugars are higher than 150 consistently after being on it for a few weeks, then call office and higher dose of Trulicity will be ordered for you.     Test blood sugar 2-3x/day.     Return to clinic in 2 months with labs prior.

## 2019-09-25 NOTE — PROGRESS NOTES
Diabetes Education  Author: Kira Roa RD  Date: 9/25/2019    Diabetes Care Management Summary  Diabetes Education Record Assessment/Progress: Comprehensive/Group(last DM educ 8/21/19)  Current Diabetes Risk Level: Moderate     Last A1c:   Lab Results   Component Value Date    HGBA1C 9.2 (H) 08/14/2019     Last visit with Diabetes Educator: : 09/25/2019      Diabetes Type  Diabetes Type : Type II    Diabetes History  Diabetes Diagnosis: >10 years  Current Treatment: Insulin, Oral Medication, Diet    Health Maintenance was reviewed today with patient. Discussed with patient importance of routine eye exams, foot exams/foot care, blood work (i.e.: A1c, microalbumin, and lipid), dental visits, yearly flu vaccine, and pneumonia vaccine as indicated by PCP. Patient verbalized understanding.     Health Maintenance Topics with due status: Not Due       Topic Last Completion Date    Eye Exam 11/28/2018    Lipid Panel 06/13/2019    Hemoglobin A1c 08/14/2019    Colonoscopy 09/03/2019     Health Maintenance Due   Topic Date Due    TETANUS VACCINE  12/19/1984    Pneumococcal Vaccine (Medium Risk) (1 of 1 - PPSV23) 12/19/1985    Foot Exam  10/08/2016    Urine Microalbumin  08/02/2019       Nutrition  Meal Planning: 3 meals per day, diet drinks, artificial sweeteners, eats out seldom, water    Monitoring   Monitoring: Other  Self Monitoring : checks BG at least 2 to 4x/day/ fasting  and /or 2 hr pp  Blood Glucose Logs: Yes(in phone; [phone])  Do you use a personal continuous glucose monitor?: No  Can you tell when your blood sugar is too high?: no    Exercise   Exercise Type: walking(in PT)    Current Diabetes Treatment   Current Treatment: Insulin, Oral Medication, Diet    Social History  Preferred Learning Method: Face to Face  Primary Support: Self, Spouse  Educational Level: High School  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  Smoking Status: Current Smoker  Alcohol Use: Daily                                Barriers to  Change  Barriers to Change: None    Readiness to Learn   Readiness to Learn : Eager    Cultural Influences  Cultural Influences: No    Diabetes Education Assessment/Progress  Diabetes Disease Process (diabetes disease process and treatment options): Discussion  -Follow up visit today notes many positive changes in diet; pt keeping BG log, controlling wt and BG, reduced intake of beer  -Congratulated pt on modifying his past habits and improving his BG readings    Nutrition (Incorporating nutritional management into one's lifestyle): Discussion, Individual Session, Written Materials Provided, Demonstrates Understanding/Competency (verbalizes/demonstrates)  Pt has introduced fruit and veg to 3 meals/week  instead of 1-2/month as previously noted;   -beer intake down to 2-3/day and limited to meals. No longer drinks Monster drinks  -Discussed meal strategies once  back to work; suggested setting timer to remind pt to have his meals.pr snacks during his work shift. Discussed free-low carb (0-5) snack choices    Physical Activity (incorporating physical activity into one's lifestyle): Discussion, Individual Session  Medications (states correct name, dose, onset, peak, duration, side effects & timing of meds): Discussion, Individual Session  Monitoring (monitoring blood glucose/other parameters & using results): Discussion, Individual Session  -pt now checking BG 2+times/day and keeps log in phone; Ranges 101-149  Acute Complications (preventing, detecting, and treating acute complications): Discussion    Goals  Patient has selected/evaluated goals during today's session: Yes, evaluated  Healthy Eating: In Progress    Diabetes Self-Management Support Plan  Diabetes Learning: DM websites  Exercise/Nutrition: use a local track  Stress Management: family  Review Status: Patient was provided Diabetes Self-Management Support Plan document that includes support options.     Diabetes Meal Plan  Restrictions: Restricted  Carbohydrate  Carbohydrate Per Meal: 45-60g  Carbohydrate Per Snack : 15-20g    Today's Self-Management Care Plan was developed with the patient's input and is based on barriers identified during today's assessment.    The long and short-term goals in the care plan were written with the patient/caregiver's input. The patient has agreed to work toward these goals to improve his overall diabetes control.      The patient received a copy of today's self-management plan and verbalized understanding of the care plan, goals, and all of today's instructions.      The patient was encouraged to communicate with his physician and care team regarding his condition(s) and treatment.  I provided the patient with my contact information today and encouraged him to contact me via phone or patient portal as needed.     Education Units of Time   Time Spent: 30 min

## 2019-09-27 ENCOUNTER — TELEPHONE (OUTPATIENT)
Dept: FAMILY MEDICINE | Facility: CLINIC | Age: 54
End: 2019-09-27

## 2019-09-27 NOTE — LETTER
October 1, 2019    Abel Morris  [address] [place] [address]             [facility]  [address] [place] [address]  Phone: [phone]  Fax: [phone] Dear  Arturo:    I have been unable to reach you by phone for your appointment to Podiatry. Please call me at the clinic [phone] to book your appointment.      If you have any questions or concerns, please don't hesitate to call.    Sincerely,        Sirisha Thomson MA

## 2019-10-01 ENCOUNTER — CLINICAL SUPPORT (OUTPATIENT)
Dept: REHABILITATION | Facility: HOSPITAL | Age: 54
End: 2019-10-01
Attending: PSYCHIATRY & NEUROLOGY
Payer: COMMERCIAL

## 2019-10-01 DIAGNOSIS — Z74.09 DECREASED STRENGTH, ENDURANCE, AND MOBILITY: ICD-10-CM

## 2019-10-01 DIAGNOSIS — R29.898 LEG WEAKNESS, BILATERAL: ICD-10-CM

## 2019-10-01 DIAGNOSIS — R53.1 DECREASED STRENGTH, ENDURANCE, AND MOBILITY: ICD-10-CM

## 2019-10-01 DIAGNOSIS — R26.89 IMPAIRMENT OF BALANCE: ICD-10-CM

## 2019-10-01 DIAGNOSIS — R26.9 GAIT DIFFICULTY: ICD-10-CM

## 2019-10-01 DIAGNOSIS — R68.89 DECREASED STRENGTH, ENDURANCE, AND MOBILITY: ICD-10-CM

## 2019-10-01 PROCEDURE — 97110 THERAPEUTIC EXERCISES: CPT | Mod: PN

## 2019-10-01 NOTE — PROGRESS NOTES
Physical Therapy Daily Treatment Note     Name: Abel Morris  Clinic Number: [id number]    Therapy Diagnosis:   Encounter Diagnoses   Name Primary?    Leg weakness, bilateral     Decreased strength, endurance, and mobility     Gait difficulty     Impairment of balance      Physician: Arlene Barth DO    Physician Orders: PT Eval and Treat   Medical Diagnosis from Referral: R29.898 (ICD-10-CM) - Bilateral leg weakness  Evaluation Date: 8/27/2019  Authorization Period Expiration: 12/31/2019  Plan of Care Expiration: 11/27/2019  Visit # / Visits authorized: 5/20                       PN Due: 9/24/2019     Time In: 7:06  Time Out: 8:00a  Total Time: 54 minutes  Total Billable Time: 30 minutes    Precautions: Standard, Diabetes and Fall    Subjective     Pt reports: He took his gabapentin this morning so he is not having pain or tingling in his legs right now. States he felt good after last session, his B LEs felt a little bit sore.     He was compliant with home exercise program.  Response to previous treatmen: no increased pain/discomfort  Functional change: none reported    Pain: 0/10  Location: bilateral legs      Objective     CMS Impairment/Limitation/Restriction for FOTO Lower Leg (w/o Knee) Survey  Status Limitation G-Code CMS Severity Modifier  Intake 43% 57%  Predicted 63% 37% Goal Status+ CJ - At least 20 percent but less than 40 percent  10/1/2019 73% 27% Current Status CJ - At least 20 percent but less than 40 percent     Lower Extremity Strength  Right LE   Left LE     Knee extension: 4-/5 Knee extension: 4-/5   Knee flexion: 3+/5 Knee flexion: 3+/5   Hip flexion: 4-/5 Hip flexion: 4-/5   Hip extension:  4-/5 Hip extension: 4-/5   Hip abduction: 3+/5 Hip abduction: 3+/5   Hip ER 4-/5 Hip ER 4-/5   Hip IR 4/5 Hip IR 4/5   Hip adduction: 3-/5 Hip adduction 3-/5   Ankle dorsiflexion: 4-/5 Ankle dorsiflexion: 4-/5   Ankle plantarflexion: 3-/5 Ankle plantarflexion: 3-/5      Special Tests:    -FGA score: 22/30   -Single Limb Balance: poor strategies - 5 seconds on R, 2 seconds on L   -Mod CTSIB:               -Standing on ground, EO: 30 seconds - min to no sway              -Standing on ground, EC: 30 seconds - min to no sway (posterior > anterior)              -Standing on foam, EO: 30 seconds- min sway               -Standing on foam, EC: 30 seconds - mod sway (arms in high guard)   -10 meter walk test: 1.6m/s - normal pace, 2m/s - fast pace   -5 time sit to stand: 13 seconds    Abel received therapeutic exercises to develop strength, endurance and ROM for 44 minutes including:    Nustep 2x3 min - rest break in between 1.5 minutes  Bridges 2x10  LAQs 2x10  Seated toe raise 2x10  Hamstring Stretch 3x30 ea  Standing marching 2x20   Standing hamstring curls 2x10  Standing heel raise 2x10  +Mini squats x8  +Clamshells, YTB, 2x10   +Hip adduction ball squeeze 2x10, 3 sec hold     Abel participated in neuromuscular re-education activities to improve: Balance, Coordination and Proprioception for 10 minutes. The following activities were included:    Balance on firm surface:   Standing feet together eyes closed 3x30 ea   Standing feet together horizontal/vertical head turns 3x30 ea   Tandem standing 3x30 ea  Foam surface:    Standing feet together, EC [id number]    Standing feet together horizontal/vertical head turns 3x30 sec ea   Walking over 6 in hurdles in // bars   Side steps in // bars x4 rounds   Walking backwards in // bars x4 rounds   Standing on wedge into DF and PF - SBA - 3x30 sec ea     Home Exercises Provided and Patient Education Provided     Education provided:   Cont to perform HEP as provided.     Written Home Exercises Provided: Patient instructed to cont prior HEP.  Exercises were reviewed and Abel was able to demonstrate them prior to the end of the session.  Abel demonstrated good  understanding of the education provided.     See EMR under Patient Instructions for exercises provided  prior visit.    Assessment     Pt tolerated treatment well overall with no adverse affects. Pt able to complete tandem stance this session without UE support, however, requiring CGA for maintaining balance. Pt tolerated all exercises with minimal reported pain/tingling in B LEs secondary to taking gabapentin prior to therapy. Rest breaks taken frequently throughout exercise, pt reminded of importance of monitoring LE fatigue and adjusting activity as necessary.     Abel is progressing well towards his goals.   Pt prognosis is Good.     Pt will continue to benefit from skilled outpatient physical therapy to address the deficits listed in the problem list box on initial evaluation, provide pt/family education and to maximize pt's level of independence in the home and community environment.     Pt's spiritual, cultural and educational needs considered and pt agreeable to plan of care and goals.    Anticipated barriers to physical therapy: none    Goals:  Short Term Goals: 4 weeks   1. Pt will be independent with his HEP in order to demonstrate self-management of his condition. - Goal met   2. Pt will demonstrate an increase in BLE muscle strength by 1/3 muscle grade in order to improve functional mobility. progressing  3. Pt will be able to perform 5 time sit to  20 seconds to demonstrate increased LE strength, coordination, and ease with transfers. Goal met   4. Pt will demonstrate increased gait speed during 10 meter walk with safety and no LOB to 1.2m/s in order to ambulate in community more efficiently. Goal met   5. Pt will be able to walk 2 blocks with < or = to minimal difficulty in order to improve quality of life. Goal met      Long Term Goals: 8 weeks   1. Pt will demonstrate in increase in BLE muscle strength by 1 muscle grade in order to improve functional mobility. progressing  3. Pt will be able to perform 5 time sit to  17 seconds to demonstrate increased LE strength, coordination, and  ease with transfers. Goal Met   4. Pt will demonstrate increased gait speed during 10 meter walk with safety and no LOB to 1.3m/s in order to ambulate in community more efficiently. Goal met   5. Pt will demonstrate an increased FGA score to > or = to 23/30 to demonstrate decrease risk of falls. progressing  6. Pt will be able to perform his usual work or housework activities with < or = to minimal difficulty in order to participate fully in his life. Goal Met   New goal: Pt will be able to perform lifting 10# objects from the 12 in step with appropriate body mechanics in order to improve effectiveness when he returns to work. Ongoing     Plan     Certification date: 8/27/2019 - 11/27/2019    Cont skilled PT session towards PT and patient's goals. Continue progression of LE strengthening and balance as tolerated    Mely Angel, PT, DPT  10/01/2019

## 2019-10-09 ENCOUNTER — CLINICAL SUPPORT (OUTPATIENT)
Dept: REHABILITATION | Facility: HOSPITAL | Age: 54
End: 2019-10-09
Attending: PSYCHIATRY & NEUROLOGY
Payer: COMMERCIAL

## 2019-10-09 DIAGNOSIS — R26.89 IMPAIRMENT OF BALANCE: ICD-10-CM

## 2019-10-09 DIAGNOSIS — Z74.09 DECREASED STRENGTH, ENDURANCE, AND MOBILITY: ICD-10-CM

## 2019-10-09 DIAGNOSIS — R29.898 LEG WEAKNESS, BILATERAL: ICD-10-CM

## 2019-10-09 DIAGNOSIS — R26.9 GAIT DIFFICULTY: ICD-10-CM

## 2019-10-09 DIAGNOSIS — R68.89 DECREASED STRENGTH, ENDURANCE, AND MOBILITY: ICD-10-CM

## 2019-10-09 DIAGNOSIS — R53.1 DECREASED STRENGTH, ENDURANCE, AND MOBILITY: ICD-10-CM

## 2019-10-09 PROCEDURE — 97110 THERAPEUTIC EXERCISES: CPT | Mod: PN

## 2019-10-09 PROCEDURE — 97112 NEUROMUSCULAR REEDUCATION: CPT | Mod: PN

## 2019-10-09 NOTE — PROGRESS NOTES
Physical Therapy Daily Treatment Note     Name: Abel [place]  Clinic Number: [id number]    Therapy Diagnosis:   No diagnosis found.  Physician: Arlene Barth DO    Physician Orders: PT Eval and Treat   Medical Diagnosis from Referral: R29.898 (ICD-10-CM) - Bilateral leg weakness  Evaluation Date: 8/27/2019  Authorization Period Expiration: 12/31/2019  Plan of Care Expiration: 11/27/2019  Visit # / Visits authorized: 5/20                       PN Due: 10/29/2019     Time In: 7:03  Time Out: 7: 53 a  Total Time: 53 minutes  Total Billable Time: 30 minutes    Precautions: Standard, Diabetes and Fall    Subjective     Pt reports: He is feeling good this morning. No tingling or pain in legs as he took gabapentin this morning.     He was compliant with home exercise program.  Response to previous treatmen: no increased pain/discomfort  Functional change: none reported    Pain: 0/10  Location: bilateral legs      Objective       Abel received therapeutic exercises to develop strength, endurance and ROM for 50 minutes including:    Nustep 2x3 min - rest break in between 1.5 minutes  Bridges 2x10  LAQs 2x10  Seated toe raise 2x10  Hamstring Stretch 3x30 ea  Standing marching 2x20   Standing hamstring curls 2x10  Standing heel raise 2x10  Mini squats x8  Clamshells, YTB, 2x10   Hip adduction ball squeeze 2x10, 3 sec hold     Abel participated in neuromuscular re-education activities to improve: Balance, Coordination and Proprioception for 10 minutes. The following activities were included:    Balance on firm surface:   Standing feet together eyes closed 3x30 ea   Standing feet together horizontal/vertical head turns 3x30 ea   Tandem standing 3x30 ea  Foam surface:    Standing feet together, EC [id number]    Standing feet together, EO 3x30 sec   Standing feet together horizontal/vertical head turns 3x30 sec ea   Walking over 6 in hurdles in // bars   Side steps in // bars x4 rounds   Walking backwards x 25 feet  x4 laps  Standing on wedge into DF and PF - SBA - 3x30 sec ea     Home Exercises Provided and Patient Education Provided     Education provided:   Cont to perform HEP as provided.     Written Home Exercises Provided: Patient instructed to cont prior HEP.  Exercises were reviewed and Abel was able to demonstrate them prior to the end of the session.  Abel demonstrated good  understanding of the education provided.     See EMR under Patient Instructions for exercises provided prior visit.    Assessment     Pt tolerated treatment well overall with no adverse affects. Pt with most difficulty this session completing tandem stance on firm surface with CGA. Pt requiring vc during backwards walking for larger step length. Pt requiring rest breaks throughout there-ex to prevent B LE fatigue.     Abel is progressing well towards his goals.   Pt prognosis is Good.     Pt will continue to benefit from skilled outpatient physical therapy to address the deficits listed in the problem list box on initial evaluation, provide pt/family education and to maximize pt's level of independence in the home and community environment.     Pt's spiritual, cultural and educational needs considered and pt agreeable to plan of care and goals.    Anticipated barriers to physical therapy: none    Goals:  Short Term Goals: 4 weeks   1. Pt will be independent with his HEP in order to demonstrate self-management of his condition. - Goal met   2. Pt will demonstrate an increase in BLE muscle strength by 1/3 muscle grade in order to improve functional mobility. progressing  3. Pt will be able to perform 5 time sit to  20 seconds to demonstrate increased LE strength, coordination, and ease with transfers. Goal met   4. Pt will demonstrate increased gait speed during 10 meter walk with safety and no LOB to 1.2m/s in order to ambulate in community more efficiently. Goal met   5. Pt will be able to walk 2 blocks with < or = to minimal difficulty  in order to improve quality of life. Goal met      Long Term Goals: 8 weeks   1. Pt will demonstrate in increase in BLE muscle strength by 1 muscle grade in order to improve functional mobility. progressing  3. Pt will be able to perform 5 time sit to  17 seconds to demonstrate increased LE strength, coordination, and ease with transfers. Goal Met   4. Pt will demonstrate increased gait speed during 10 meter walk with safety and no LOB to 1.3m/s in order to ambulate in community more efficiently. Goal met   5. Pt will demonstrate an increased FGA score to > or = to 23/30 to demonstrate decrease risk of falls. progressing  6. Pt will be able to perform his usual work or housework activities with < or = to minimal difficulty in order to participate fully in his life. Goal Met   New goal: Pt will be able to perform lifting 10# objects from the 12 in step with appropriate body mechanics in order to improve effectiveness when he returns to work. Ongoing     Plan     Certification date: 8/27/2019 - 11/27/2019    Cont skilled PT session towards PT and patient's goals. Continue progression of LE strengthening and balance as tolerated    Arabella Rainey, PT, DPT  10/09/2019

## 2019-10-14 NOTE — PROGRESS NOTES
Physical Therapy Daily Treatment Note     Name: Abel Morris  Clinic Number: [id number]    Therapy Diagnosis:   Encounter Diagnoses   Name Primary?    Leg weakness, bilateral     Decreased strength, endurance, and mobility     Gait difficulty     Impairment of balance      Physician: Arlene Barth DO    Physician Orders: PT Eval and Treat   Medical Diagnosis from Referral: R29.898 (ICD-10-CM) - Bilateral leg weakness  Evaluation Date: 8/27/2019  Authorization Period Expiration: 12/31/2019  Plan of Care Expiration: 11/27/2019  Visit # / Visits authorized: 6/20                       PN Due: 10/29/2019     Time In: 6:56a  Time Out: 7:54a  Total Time: 58 minutes  Total Billable Time: 58 minutes    Precautions: Standard, Diabetes and Fall    Subjective     Pt reports: he's been feeling pretty good. He goes back to work on Saturday and he will be working 5 days a week from 1pm to 11pm     He was compliant with home exercise program.  Response to previous treatmen: no increased pain/discomfort  Functional change: none reported    Pain: 0/10  Location: bilateral legs      Objective     Abel received therapeutic exercises to develop strength, endurance and ROM for 35 minutes including:    Nustep x5 min - rest break PRN - none taken today 10/15/2019  Bridges 2x10  LAQs 2x10  Hamstring Stretch 3x30 ea  Standing marching 2x20   Standing hamstring curls 2x10  Standing heel/toe raise 3x10  Mini Squat taps to 24 in plyo box x10  +Sit to stands from chair x10  Clamshells, YTB, 2x10   Hip adduction ball squeeze 2x10, 3 sec hold   +Shuttle squat 3x10, 1 black cord     Abel participated in neuromuscular re-education activities to improve: Balance, Coordination and Proprioception for 23 minutes. The following activities were included:    Balance on firm surface:   Standing feet together eyes closed 3x30 ea   Standing feet together horizontal/vertical head turns 3x30 ea   Tandem standing 3x30 ea  Foam surface:     Standing feet together, EC [id number]    Standing feet together, EO 3x30 sec   Standing feet together horizontal/vertical head turns 3x30 sec ea   Walking over 6 in hurdles in // bars   Side steps in // bars, YTB x4 rounds   Walking backwards x 25 feet x4 laps  Standing on wedge into DF and PF - SBA - 3x30 sec ea     Home Exercises Provided and Patient Education Provided     Education provided:   Cont to perform HEP as provided.     Written Home Exercises Provided: Patient instructed to cont prior HEP.  Exercises were reviewed and Abel was able to demonstrate them prior to the end of the session.  Abel demonstrated good  understanding of the education provided.     See EMR under Patient Instructions for exercises provided prior visit.    Assessment     Pt tolerated treatment well overall with no adverse affects. Pt tolerated all new exercises well; however, had mod-max difficulty with sit to stands from chair. Pt was able to complete ten without UE. Pt to return to work on Saturday, will reassess function next treatment. Continue working towards improved functional mobility and appropriate body mechanics during squatting technique. Continue with POC.     Abel is progressing well towards his goals.   Pt prognosis is Good.     Pt will continue to benefit from skilled outpatient physical therapy to address the deficits listed in the problem list box on initial evaluation, provide pt/family education and to maximize pt's level of independence in the home and community environment.     Pt's spiritual, cultural and educational needs considered and pt agreeable to plan of care and goals.    Anticipated barriers to physical therapy: none    Goals:  Short Term Goals: 4 weeks   1. Pt will be independent with his HEP in order to demonstrate self-management of his condition. - Goal met   2. Pt will demonstrate an increase in BLE muscle strength by 1/3 muscle grade in order to improve functional mobility. progressing  3. Pt  will be able to perform 5 time sit to  20 seconds to demonstrate increased LE strength, coordination, and ease with transfers. Goal met   4. Pt will demonstrate increased gait speed during 10 meter walk with safety and no LOB to 1.2m/s in order to ambulate in community more efficiently. Goal met   5. Pt will be able to walk 2 blocks with < or = to minimal difficulty in order to improve quality of life. Goal met      Long Term Goals: 8 weeks   1. Pt will demonstrate in increase in BLE muscle strength by 1 muscle grade in order to improve functional mobility. progressing  3. Pt will be able to perform 5 time sit to  17 seconds to demonstrate increased LE strength, coordination, and ease with transfers. Goal Met   4. Pt will demonstrate increased gait speed during 10 meter walk with safety and no LOB to 1.3m/s in order to ambulate in community more efficiently. Goal met   5. Pt will demonstrate an increased FGA score to > or = to 23/30 to demonstrate decrease risk of falls. progressing  6. Pt will be able to perform his usual work or housework activities with < or = to minimal difficulty in order to participate fully in his life. Goal Met   New goal: Pt will be able to perform lifting 10# objects from the 12 in step with appropriate body mechanics in order to improve effectiveness when he returns to work. Ongoing     Plan     Certification date: 8/27/2019 - 11/27/2019    Cont skilled PT session towards PT and patient's goals. Continue progression of LE strengthening and balance as tolerated    Mely Angel, PT, DPT  10/15/2019

## 2019-10-15 ENCOUNTER — CLINICAL SUPPORT (OUTPATIENT)
Dept: REHABILITATION | Facility: HOSPITAL | Age: 54
End: 2019-10-15
Attending: PSYCHIATRY & NEUROLOGY
Payer: COMMERCIAL

## 2019-10-15 DIAGNOSIS — R26.89 IMPAIRMENT OF BALANCE: ICD-10-CM

## 2019-10-15 DIAGNOSIS — Z74.09 DECREASED STRENGTH, ENDURANCE, AND MOBILITY: ICD-10-CM

## 2019-10-15 DIAGNOSIS — R53.1 DECREASED STRENGTH, ENDURANCE, AND MOBILITY: ICD-10-CM

## 2019-10-15 DIAGNOSIS — R26.9 GAIT DIFFICULTY: ICD-10-CM

## 2019-10-15 DIAGNOSIS — R68.89 DECREASED STRENGTH, ENDURANCE, AND MOBILITY: ICD-10-CM

## 2019-10-15 DIAGNOSIS — R29.898 LEG WEAKNESS, BILATERAL: ICD-10-CM

## 2019-10-15 PROCEDURE — 97110 THERAPEUTIC EXERCISES: CPT | Mod: PN

## 2019-10-15 PROCEDURE — 97112 NEUROMUSCULAR REEDUCATION: CPT | Mod: PN

## 2019-10-21 ENCOUNTER — LAB VISIT (OUTPATIENT)
Dept: LAB | Facility: HOSPITAL | Age: 54
End: 2019-10-21
Attending: PSYCHIATRY & NEUROLOGY
Payer: COMMERCIAL

## 2019-10-21 ENCOUNTER — OFFICE VISIT (OUTPATIENT)
Dept: NEUROLOGY | Facility: CLINIC | Age: 54
End: 2019-10-21
Payer: COMMERCIAL

## 2019-10-21 VITALS
HEART RATE: 76 BPM | SYSTOLIC BLOOD PRESSURE: 120 MMHG | WEIGHT: 242.5 LBS | HEIGHT: 75 IN | RESPIRATION RATE: 20 BRPM | DIASTOLIC BLOOD PRESSURE: 78 MMHG | BODY MASS INDEX: 30.15 KG/M2

## 2019-10-21 DIAGNOSIS — E11.42 DIABETIC POLYNEUROPATHY ASSOCIATED WITH TYPE 2 DIABETES MELLITUS: ICD-10-CM

## 2019-10-21 DIAGNOSIS — R26.89 IMPAIRMENT OF BALANCE: ICD-10-CM

## 2019-10-21 DIAGNOSIS — Z78.9 ALCOHOL USE: ICD-10-CM

## 2019-10-21 LAB — FOLATE SERPL-MCNC: 17.3 NG/ML (ref 4–24)

## 2019-10-21 PROCEDURE — 99215 OFFICE O/P EST HI 40 MIN: CPT | Mod: S$GLB,,, | Performed by: PSYCHIATRY & NEUROLOGY

## 2019-10-21 PROCEDURE — 82746 ASSAY OF FOLIC ACID SERUM: CPT

## 2019-10-21 PROCEDURE — 3008F BODY MASS INDEX DOCD: CPT | Mod: CPTII,S$GLB,, | Performed by: PSYCHIATRY & NEUROLOGY

## 2019-10-21 PROCEDURE — 99999 PR PBB SHADOW E&M-EST. PATIENT-LVL III: ICD-10-PCS | Mod: PBBFAC,,, | Performed by: PSYCHIATRY & NEUROLOGY

## 2019-10-21 PROCEDURE — 3046F HEMOGLOBIN A1C LEVEL >9.0%: CPT | Mod: CPTII,S$GLB,, | Performed by: PSYCHIATRY & NEUROLOGY

## 2019-10-21 PROCEDURE — 84425 ASSAY OF VITAMIN B-1: CPT

## 2019-10-21 PROCEDURE — 80321 ALCOHOLS BIOMARKERS 1OR 2: CPT

## 2019-10-21 PROCEDURE — 86255 FLUORESCENT ANTIBODY SCREEN: CPT

## 2019-10-21 PROCEDURE — 84207 ASSAY OF VITAMIN B-6: CPT

## 2019-10-21 PROCEDURE — 99215 PR OFFICE/OUTPT VISIT, EST, LEVL V, 40-54 MIN: ICD-10-PCS | Mod: S$GLB,,, | Performed by: PSYCHIATRY & NEUROLOGY

## 2019-10-21 PROCEDURE — 3008F PR BODY MASS INDEX (BMI) DOCUMENTED: ICD-10-PCS | Mod: CPTII,S$GLB,, | Performed by: PSYCHIATRY & NEUROLOGY

## 2019-10-21 PROCEDURE — 3046F PR MOST RECENT HEMOGLOBIN A1C LEVEL > 9.0%: ICD-10-PCS | Mod: CPTII,S$GLB,, | Performed by: PSYCHIATRY & NEUROLOGY

## 2019-10-21 PROCEDURE — 99999 PR PBB SHADOW E&M-EST. PATIENT-LVL III: CPT | Mod: PBBFAC,,, | Performed by: PSYCHIATRY & NEUROLOGY

## 2019-10-21 RX ORDER — PERPHENAZINE 16 MG
600 TABLET ORAL DAILY
COMMUNITY
Start: 2019-10-21 | End: 2020-10-20

## 2019-10-21 NOTE — PROGRESS NOTES
NEUROLOGY  Outpatient CONSULT    [facility]  [address], [place] [address]  [phone] (office) / [phone] (fax)    Patient Name:  Abel Morris  :  1965  MR #:  [id number]  Acct #:  [id number]    Date of Neurology Consult: 10/21/2019  Name of Neurologist: Valentine Pelayo D.O, ABPN, AOBNP, ABEM    Other Physicians:  Sae Starkey MD (Primary Care Physician); Arlene Barth DO (Referring)      Chief Complaint: Extremity Weakness      History of Present Illness (HPI):  Abel Morris is a 53 y.o. male referred by Dr. Gil for neuromuscular consultation regarding neuropathy.    Patient states that he was getting burning pain shooting up and down his legs.  He had numbness in his feet.  He was having difficulty walking.  He states he can't feel the floor.  This is affecting his balance.  When he would try to walk fast this made his coordination worse.  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  The pain was becoming worse making it hard to walk.  He feels that the pain is better on Gabapentin.  He worked Saturday so his muscles are sore.  But he feels he is doing a little better.  He has been going to physical therapy.  He is exercising and working on balance.    His symptoms started in February, but worsened by the time he was seeing Dr. Gil.  This was mainly a pain issue.  He denies any abnormal sensations in his legs.  He states the numbness seemed to come out of nowhere.    He had not been sick.  He drinks alcohol about 3 times a week, this is about 2 cans of beer.  Before this he was drinking about 12 cans twice a week.  He has not used street drugs in many years.  He has no history of thyroid disease.  He has no vitamin deficiencies he is aware of.  He has had diabetes for 17 years.  This is better controlled now, but he had not been controlled for a long time.  Toujeo was started this year by his PCP.  He  had an aunt with neuropathy from diabetes.  His grandmother on his mother's side had diabetes and he believes she had neuropathy as well.    He has not fallen.  He has no trouble with control of his bowel or bladder.  He has no trouble with digestion.      Treatment to date:    Gabapentin    Review of Systems:   General: Weight gain: No, Weight Loss: No, Fatigue: No,   Fever: No, Chills: No, Night Sweats: No, Insomnia: No, Excessive sleeping: No   Respiratory:  Cough: No, Shortness of Breath: No,   Wheezing: No, Excessive Snoring: No, Coughing up blood: No  Endocrine: Heat Intolerance: No, Cold Intolerance: No,   Excessive Thirst: No, Excessive Hunger: No,   Eyes:  Blurred Vision: No, Double Vision: No,   Light Sensitivity: No, Eye pain: No  Musculoskeletal: Muscle Aches/Pain: No, Joint Pain/Swelling: No, Muscle Cramps: No, Muscle Weakness: No, Neck Pain: No, Back Pain: No   Neurological: Difficulty Walking/Falls: Yes, Headache Migraine: No, Dizziness/Vertigo: No, Fainting: No, Difficulty with Speech: No, Weakness: No, Tingling/Numbness: Yes, Tremors: No, Memory Problems: No, Seizures: No, Difficulty Swallowing: No, Altered Taste: No.  Cardiovascular: Chest Pain: No, Shortness of Breath: No,   Palpitations: No,  Gastrointestinal: Nausea/Vomiting: No, Constipation: No, Diarrhea: No, Bloody Stools: No   Psych/Cog:  Depression: No, Anxiety: No, Hallucinations: No, Problems Concentrating: No  : Frequent Urination: No, Incontinence: No, Blood of Urine: No, Urinary Infections: No, Changes in Sex Drive: No   ENT:Hearing Loss: No, Earache: No, Ringing in Ears: No,   Facial Pain: No, Chronic Congestion: No   Immune: Seasonal Allergies: No, Hives and/or Rashes: No  The remainder of the review of twelve body systems was reviewed and normal.    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    History of colonic polyps 8/17/2018    Hyperlipidemia 11/28/2012    Poor compliance 1/8/2014     Past Surgical History:   Procedure Laterality Date    COLONOSCOPY N/A 8/2/2016    Procedure: COLONOSCOPY;  Surgeon: Miguel Persaud MD;  Location: [facility] ENDO;  Service: Endoscopy;  Laterality: N/A;    COLONOSCOPY N/A 9/3/2019    Procedure: COLONOSCOPY;  Surgeon: Álvaro Carmichael MD;  Location: [facility] ENDO;  Service: Endoscopy;  Laterality: N/A;  confirmed appt- sp    LUMBAR PUNCTURE N/A 09/19/2019     Family History   Problem Relation Age of Onset    No Known Problems Mother     Colon polyps Father 61    No Known Problems Sister     No Known Problems Brother     No Known Problems Maternal Aunt     No Known Problems Maternal Uncle     No Known Problems Paternal Aunt     No Known Problems Paternal Uncle     Diabetes Maternal Grandmother     No Known Problems Maternal Grandfather     No Known Problems Paternal Grandmother     No Known Problems Paternal Grandfather     Amblyopia Neg Hx     Blindness Neg Hx     Cancer Neg Hx     Cataracts Neg Hx     Glaucoma Neg Hx     Hypertension Neg Hx     Macular degeneration Neg Hx     Retinal detachment Neg Hx     Strabismus Neg Hx     Stroke Neg Hx     Thyroid disease Neg Hx      Alcohol use:  reports that he drinks about 6.0 standard drinks of alcohol per week.   (Of note, 0.6 oz = 1 beer or 6 oz = 10 beers).  Tobacco use:  reports that he has been smoking cigars. He has never used smokeless tobacco.  Street drug use:  reports that he does not use drugs.  Allergies: Patient has no known allergies..    Home Medications:     Current Outpatient Medications:     aspirin (ECOTRIN) 81 MG EC tablet, Take 1 tablet (81 mg total) by mouth once daily., Disp: , Rfl: 0    blood sugar  "diagnostic Strp, To check BG 3 times daily, to use with insurance preferred meter, Disp: 300 each, Rfl: 3    blood-glucose meter kit, To check BG 3 times daily, to use with insurance preferred meter, Disp: 1 each, Rfl: 0    gabapentin (NEURONTIN) 300 MG capsule, Take 1 capsule (300 mg total) by mouth 3 (three) times daily., Disp: 90 capsule, Rfl: 11    glimepiride (AMARYL) 4 MG tablet, Take 1 tablet (4 mg total) by mouth daily with breakfast., Disp: 90 tablet, Rfl: 0    insulin glargine, TOUJEO, (TOUJEO SOLOSTAR U-300 INSULIN) 300 unit/mL (1.5 mL) InPn pen, Inject 40 units once daily, Disp: 3 Syringe, Rfl: 0    lancets Misc, To check BG 3 times daily, to use with insurance preferred meter, Disp: 300 each, Rfl: 3    metFORMIN (GLUCOPHAGE) 1000 MG tablet, 1 Tablet Oral Twice a day, Disp: 180 tablet, Rfl: 12    pen needle, diabetic (BD ULTRA-FINE SHORT PEN NEEDLE) 31 gauge x 5/16" Ndle, Use 1x/day, Disp: 100 each, Rfl: 3    pravastatin (PRAVACHOL) 40 MG tablet, Take 1 tablet (40 mg total) by mouth once daily., Disp: 90 tablet, Rfl: 12    GEMFIBROZIL ORAL, , Disp: , Rfl:     polyethylene glycol (COLYTE) 240-22.72-6.72 -5.84 gram SolR, TAKE 4000 MLS BY MOUTH ONCE FOR 1 DOSE, Disp: , Rfl: 0    sildenafil (REVATIO) 20 mg Tab, 1-5 pills at a time per day prn (Patient not taking: Reported on 9/25/2019), Disp: 30 tablet, Rfl: 11    vardenafil (LEVITRA) 20 MG tablet, Half to one qd prn (Patient not taking: Reported on 9/25/2019), Disp: 10 tablet, Rfl: 12    Physical Examination:  /78   Pulse 76   Resp 20   Ht 6' 3" (1.905 m)   Wt 110 kg (242 lb 8.1 oz)   BMI 30.31 kg/m²     GENERAL:  General appearance: Well, non-toxic appearing.  No apparent distress.  Fundi exam: normal.  Neck: supple.  Carotid auscultation: normal.  Heart auscultation: normal.  Peripheral pulses: normal.  Extremities: normal.    MENTAL STATUS:  Alertness, attention span & concentration: normal.  Language: normal.  Orientation to self, " place & time:  normal.  Memory, recent & remote: normal.  Fund of knowledge: normal.    SPEECH:  Clear and fluent.  Follows complex commands.    CRANIAL NERVES:  Cranial Nerves II-XII were examined.  II - Visual fields: normal.  III, IV, VI: PERRL, EOMI, No ptosis, No nystagmus.  V - Facial sensation: normal.  VII - Face symmetry & mobility: normal.  VIII - Hearing: normal.  IX, X - Palate: mobile & midline.  XI - Shoulder shrug: normal.  XII - Tongue protrusion: normal.    GROSS MOTOR:  Gait & station: normal.  Tone: normal.  Abnormal movements: none.  Finger-nose & Heel-knee-shin: normal.  Rapid alternating movements & drift: normal.  Romberg: absent    MUSCLE STRENGTH:     Fascics Atrophy RIGHT    LEFT Atrophy Fascics     5 Neck Ext. 5       5 Neck Flex 5       5 Deltoids 5       5 Sh.Ext.Rot. 5       5 Sh.Int.Rot. 5       5 Biceps 5       5 Triceps 5       5 Forearm.Pr. 5       5 Wrist Ext. 5       5 Wrist Flex 5       5 Finger Ext. 5       5 Finger Flex 5       5 FPL 5       4 Inteross. 4                         5 Iliopsoas 5       5 Hip Abduct 5       5 Hip Adduct 5       5 Quads 5       5 Hams 5       5 Dorsiflex 5       5 Plantar Flex 5       5 Ankle Steven 5       5 Ankle Invert 5       4 Toe Ext. 4       5 Toe Flex 5                         REFLEXES:    RIGHT Reflex   LEFT   1 Biceps 1   1 Brachiorad. 1   1 Triceps 1        0 Patellar 0   0 Ankle 0        Down PLANTAR Down     SENSORY:  Sharp touch: lost in feet bilaterally, improves just distal to the ankles.  Vibration: lost in feet, better at mid-shin.      Diagnostic Data Reviewed:       Component      Latest Ref Rng & Units 9/19/2019 8/20/2019 8/14/2019   Protein, Serum      6.0 - 8.4 g/dL   7.7   Albumin grams/dl      3.35 - 5.55 g/dL   4.77   Alpha-1 grams/dl      0.17 - 0.41 g/dL   0.24   Alpha-2 grams/dl      0.43 - 0.99 g/dL   0.78   Beta grams/dl      0.50 - 1.10 g/dL   0.89   Gamma grams/dl      0.67 - 1.58 g/dL   1.02   Heme Aliquot      mL 2.0      Appearance, CSF      Clear Clear     COLOR CSF      Colorless Colorless     WBC, CSF      0 - 5 /cu mm 1     RBC, CSF      0 /cu mm 4 (A)     Vitamin B-12      210 - 950 pg/mL   510   TSH      0.400 - 4.000 uIU/mL   0.933   Sed Rate      0 - 23 mm/Hr   8   Pathologist Interpretation SPE         REVIEWED   CPK      20 - 200 U/L  143    Protein, CSF      15 - 40 mg/dL 72 (H)     Glucose, CSF      40 - 70 mg/dL 114 (H)         Component      Latest Ref Rng & Units 8/14/2019 6/13/2019 6/13/2019 8/2/2018            8:57 AM  8:57 AM    Hemoglobin A1C External      4.0 - 5.6 % 9.2 (H) 10.8 (H) 10.8 (H) 10.5 (H)   Estimated Avg Glucose      68 - 131 mg/dL 217 (H) 263 (H) 263 (H) 255 (H)       MRI lumbar spine:  L1/2: There are no significant degenerative changes at this level.    L2/3: There is a posteriorly oriented disc osteophyte complex resulting in mild narrowing of the left neural foramina and mild narrowing of the right neural foramina.    L3/4: There is a posteriorly oriented disc protrusion resulting in mild narrowing of bilateral neural foramina.    L4/5: There is a posteriorly oriented disc protrusion resulting in mild narrowing of the right neural foramina and moderate narrowing of the left.    L5/S1: There is a posteriorly oriented disc protrusion resulting in mild narrowing of bilateral neural foramina.    No evidence of significant central canal stenosis is noted.  There is no evidence clumping of the nerve roots.  The adjacent soft tissues are unremarkable.    MRI thoracic spine:  1. Mild spondylotic changes T2-T3-T6-T7 and T8-T9 disc spaces as above.  2. MRI of the dorsal spine otherwise is unremarkable.  3. Small right upper pole renal cyst.    MRI cervical spine:  FINDINGS:  There is normal lordosis of the cervical spine.  Heterogeneous signal intensities within the marrow of the vertebral bodies, likely from osteopenia.  There is no subluxations.  Prevertebral soft tissues are within normal limits.   Craniovertebral alignment is within normal limits.  In the craniovertebral junction no Chiari type malformations.  The signal intensities within the cervical cord are within normal limits.  There is no abnormal enhancement within the cervical cord.    C2-3: There is mild circumferential annular disc bulge without spinal stenosis or cord compression.  Mild to moderate left foraminal stenosis related to an uncovertebral joint osteophyte and mild left facet arthropathy.  Right neural foramen is unremarkable.    C3-4: Mild posterior bulging of the annulus.  No cord compression or displacement of the cord.  Neural foramina are unremarkable.    C4-5: Mild broad-based posterior bulging of the annulus without cord compression or spinal stenosis.  There is no significant foraminal stenosis.    C5-6: No disc herniations or spinal stenosis or significant foraminal stenosis.    C6-C7: No disc herniations or spinal stenosis or foraminal stenosis.    C7-T1: No disc herniations or spinal stenosis or foraminal stenosis.    Incidental note of an extradural compression of the thecal sac ventrally at T2-T3 disc space from a disc bulge and osteophyte complex, without cord compression.      EMG from 08/20/2019 data was reviewed.  He had reduced amplitudes and slowing latencies present in most of the sensory response over the sural sensory response was absent. Motor responses were absent in the lower extremity with slow motor responses in the upper extremities and velocity amplitude in the ulnar motor response.  Needle EMG revealed active denervation in distal muscles of the extremities along with neurogenic motor units present throughout the lower extremities and both distally as well as the some proximal of involvement in the upper extremities. This appears consistent with an axonal peripheral polyneuropathy without active denervation.  In addition there is evidence of L4 radiculopathy on the left and C6 radiculopathy on the right.   There is possibly a C6 radiculopathy on the left as well.    Assessment and Plan:  Abel Morris is a 53 y.o., man with peripheral polyneuropathy as a consequence of his diabetes and history of heavy alcohol intake.  His elevated protein on CSF is consistent with his longstanding, poorly controlled diabetes.  Will add other labs given his history to exclude any other exacerbating factors.    At this point, the best measure to prevent worsening is to limit use of alcohol and get blood sugar under control.   Advise an exercise program with balance and strengthening.   Advise neuropathic pain control as needed.  Gabapentin can be further maximized up to 1200mg TID as long as his kidney function tolerates.  Options include Lyrica, Trileptal, Amitriptyline  Also advise adding alpha lipoic acid 600mg daily for diabetic neuropathy management.  Advise seeing podiatry regularly for foot care in the setting of diabetes.  Advise seeing ophthalmology yearly for eye care in the setting of diabetes.      Important to note, also  has a past medical history of Arthritis, Diabetes mellitus type II, uncontrolled (11/28/2012), Diabetes mellitus with neurological manifestations, uncontrolled (6/13/2014), ED (erectile dysfunction) (11/28/2012), Eye injury, HDL lipoprotein deficiency (1/8/2014), History of colonic polyps (8/17/2018), Hyperlipidemia (11/28/2012), and Poor compliance (1/8/2014).           Valentine Pelayo D.O, ABPN, AOBNP, ABEM    This note was created with voice recognition software.  Grammatical, syntax and spelling errors may be inevitable.

## 2019-10-21 NOTE — PATIENT INSTRUCTIONS
You appear to have neuropathy secondary to your history of diabetes and alcohol use.  Will get testing today to look for any deficiencies that may require treatment to try to help neuropathy.     At this point, the best measure to prevent worsening is to limit use of alcohol and get your blood sugar under control.  Advise regular exercise including balance training.    Advise taking alpha lipoic acid 600mg daily.  This is helpful for managing neuropathy and diabetes.      Advise seeing podiatry regularly for foot care in the setting of diabetes.    Advise seeing ophthalmology yearly for eye care in the setting of diabetes.

## 2019-10-21 NOTE — LETTER
October 21, 2019      Arlene Barth DO  [address]  [address]  [facility] [address]           [facility]  [address] [place] [address]  Phone: [phone]  Fax: [phone]          Patient: Abel Morris   MR Number: [id number]   YOB: 1965   Date of Visit: 10/21/2019       Dear Dr. Arlene Barth:    Thank you for referring Abel Morris to me for evaluation. Attached you will find relevant portions of my assessment and plan of care.    If you have questions, please do not hesitate to call me. I look forward to following Abel Morris along with you.    Sincerely,    Valentine Pelayo,     Enclosure  CC:  No Recipients    If you would like to receive this communication electronically, please contact [email or web address] or [phone] to request more information on [email or web address] Link access.    For providers and/or their staff who would like to refer a patient to [facility], please contact us through our one-stop-shop provider referral line, [facility] , at [phone].    If you feel you have received this communication in error or would no longer like to receive these types of communications, please e-mail [email or web address]

## 2019-10-22 ENCOUNTER — CLINICAL SUPPORT (OUTPATIENT)
Dept: REHABILITATION | Facility: HOSPITAL | Age: 54
End: 2019-10-22
Attending: PSYCHIATRY & NEUROLOGY
Payer: COMMERCIAL

## 2019-10-22 DIAGNOSIS — R29.898 LEG WEAKNESS, BILATERAL: ICD-10-CM

## 2019-10-22 DIAGNOSIS — Z74.09 DECREASED STRENGTH, ENDURANCE, AND MOBILITY: ICD-10-CM

## 2019-10-22 DIAGNOSIS — R68.89 DECREASED STRENGTH, ENDURANCE, AND MOBILITY: ICD-10-CM

## 2019-10-22 DIAGNOSIS — R26.9 GAIT DIFFICULTY: ICD-10-CM

## 2019-10-22 DIAGNOSIS — R53.1 DECREASED STRENGTH, ENDURANCE, AND MOBILITY: ICD-10-CM

## 2019-10-22 DIAGNOSIS — R26.89 IMPAIRMENT OF BALANCE: ICD-10-CM

## 2019-10-22 PROCEDURE — 97110 THERAPEUTIC EXERCISES: CPT | Mod: PN

## 2019-10-22 PROCEDURE — 97112 NEUROMUSCULAR REEDUCATION: CPT | Mod: PN

## 2019-10-22 NOTE — PROGRESS NOTES
Physical Therapy Daily Treatment Note     Name: Abel Romero Morris  Clinic Number: [id number]    Therapy Diagnosis:   Encounter Diagnoses   Name Primary?    Leg weakness, bilateral     Decreased strength, endurance, and mobility     Gait difficulty     Impairment of balance      Physician: Arlene Barth DO    Physician Orders: PT Eval and Treat   Medical Diagnosis from Referral: R29.898 (ICD-10-CM) - Bilateral leg weakness  Evaluation Date: 8/27/2019  Authorization Period Expiration: 12/31/2019  Plan of Care Expiration: 11/27/2019  Visit # / Visits authorized: 7/20                       PN Due: 10/29/2019     Time In: 7:00a  Time Out: 8:00a  Total Time: 60 minutes  Total Billable Time: 60 minutes    Precautions: Standard, Diabetes and Fall    Subjective     Pt reports: he went back to work twice since his last therapy session and he has to get used to all the walking. His legs were burning, so he's on 600mg of Gabapentin right now. He states he went to a specialist in [place] and they told him the problem was the neuropathy and the only way to manage it is with the medicine and continued PT.  Pt reports he has to go to work today at 1pm.     He was compliant with home exercise program.  Response to previous treatmen: no increased pain/discomfort  Functional change: none reported    Pain: 2/10  Location: bilateral legs      Objective     Abel received therapeutic exercises to develop strength, endurance and ROM for 40 minutes including:    Nustep x5 min - rest break PRN - none taken today 10/22/2019  +Treadmill walking to increase endurance x5 min increase to 10 min    +Gastroc stretch on wedge [id number]  Bridges 2x10  LAQs 2x15, 2#  Hamstring Stretch 3x30 ea  Clamshells, RTB, 2x10   Hip adduction ball squeeze 2x10, 3 sec hold   Standing marching, 2#,  2x20   Standing hamstring curls, 2#, 2x10  Standing heel/toe raise 3x10  Mini Squat taps to 24 in plyo box x10  Sit to stands from chair 2x10  Shuttle  squat 3x10, 2 black cord   +Static Lunges 2x10 ea leg   +Step ups on 4 in, 2x15 ea     Abel participated in neuromuscular re-education activities to improve: Balance, Coordination and Proprioception for 20 minutes. The following activities were included:    Balance on firm surface:   Standing feet together eyes closed 3x30 ea   Standing feet together horizontal/vertical head turns 3x30 ea   Tandem standing 3x30 ea  Foam surface:    Standing feet together, EC [id number]    Standing feet together, EO 3x30 sec   Standing feet together horizontal/vertical head turns 3x30 sec ea   Walking over 6 in + 10 in  hurdles in // bars x5 rounds fwd/sideways   Side steps in // bars, YTB x4 rounds   Walking backwards x 25 feet x4 laps  Standing on wedge into DF and PF - with ball throws - 1x1 min ea     Home Exercises Provided and Patient Education Provided     Education provided:   Cont to perform HEP as provided.     Written Home Exercises Provided: Patient instructed to cont prior HEP.  Exercises were reviewed and Abel was able to demonstrate them prior to the end of the session.  Abel demonstrated good  understanding of the education provided.     See EMR under Patient Instructions for exercises provided prior visit.    Assessment     Pt tolerated treatment well overall with no adverse affects. Pt had increased leg pain after performing step ups on stairs, requiring rest breaks between sets to complete. Pt educated on importance of doctor instructions regarding diabetes, alcohol, exercise, and medication use. Pt would continue to benefit from physical therapy in order to improve his endurance, strength, and balance so he can improve his work efficiency and effectiveness.     Abel is progressing well towards his goals.   Pt prognosis is Good.     Pt will continue to benefit from skilled outpatient physical therapy to address the deficits listed in the problem list box on initial evaluation, provide pt/family education and to  maximize pt's level of independence in the home and community environment.   Pt's spiritual, cultural and educational needs considered and pt agreeable to plan of care and goals.    Anticipated barriers to physical therapy: none    Goals:  Short Term Goals: 4 weeks   1. Pt will be independent with his HEP in order to demonstrate self-management of his condition. - Goal met   2. Pt will demonstrate an increase in BLE muscle strength by 1/3 muscle grade in order to improve functional mobility. progressing  3. Pt will be able to perform 5 time sit to  20 seconds to demonstrate increased LE strength, coordination, and ease with transfers. Goal met   4. Pt will demonstrate increased gait speed during 10 meter walk with safety and no LOB to 1.2m/s in order to ambulate in community more efficiently. Goal met   5. Pt will be able to walk 2 blocks with < or = to minimal difficulty in order to improve quality of life. Goal met      Long Term Goals: 8 weeks   1. Pt will demonstrate in increase in BLE muscle strength by 1 muscle grade in order to improve functional mobility. progressing  3. Pt will be able to perform 5 time sit to  17 seconds to demonstrate increased LE strength, coordination, and ease with transfers. Goal Met   4. Pt will demonstrate increased gait speed during 10 meter walk with safety and no LOB to 1.3m/s in order to ambulate in community more efficiently. Goal met   5. Pt will demonstrate an increased FGA score to > or = to 23/30 to demonstrate decrease risk of falls. progressing  6. Pt will be able to perform his usual work or housework activities with < or = to minimal difficulty in order to participate fully in his life. Goal Met   New goal: Pt will be able to perform lifting 10# objects from the 12 in step with appropriate body mechanics in order to improve effectiveness when he returns to work. Ongoing     Plan     Certification date: 8/27/2019 - 11/27/2019    Cont skilled PT session  towards PT and patient's goals. Continue progression of LE strengthening and balance as tolerated    Mely Angel, PT, DPT  10/22/2019

## 2019-10-23 LAB
ANCA AB TITR SER IF: NORMAL TITER
P-ANCA TITR SER IF: NORMAL TITER

## 2019-10-24 LAB
PYRIDOXAL SERPL-MCNC: 7 UG/L (ref 5–50)
VIT B1 BLD-MCNC: 55 UG/L (ref 38–122)

## 2019-10-30 ENCOUNTER — CLINICAL SUPPORT (OUTPATIENT)
Dept: REHABILITATION | Facility: HOSPITAL | Age: 54
End: 2019-10-30
Attending: PSYCHIATRY & NEUROLOGY
Payer: COMMERCIAL

## 2019-10-30 DIAGNOSIS — Z74.09 DECREASED STRENGTH, ENDURANCE, AND MOBILITY: ICD-10-CM

## 2019-10-30 DIAGNOSIS — R53.1 DECREASED STRENGTH, ENDURANCE, AND MOBILITY: ICD-10-CM

## 2019-10-30 DIAGNOSIS — R29.898 LEG WEAKNESS, BILATERAL: ICD-10-CM

## 2019-10-30 DIAGNOSIS — R68.89 DECREASED STRENGTH, ENDURANCE, AND MOBILITY: ICD-10-CM

## 2019-10-30 DIAGNOSIS — R26.9 GAIT DIFFICULTY: ICD-10-CM

## 2019-10-30 DIAGNOSIS — R26.89 IMPAIRMENT OF BALANCE: ICD-10-CM

## 2019-10-30 PROCEDURE — 97112 NEUROMUSCULAR REEDUCATION: CPT | Mod: PN

## 2019-10-30 PROCEDURE — 97110 THERAPEUTIC EXERCISES: CPT | Mod: PN

## 2019-10-30 NOTE — PROGRESS NOTES
Physical Therapy Daily Treatment Note     Name: Abel Romero Riggins  Clinic Number: [id number]    Therapy Diagnosis:   Encounter Diagnoses   Name Primary?    Leg weakness, bilateral     Decreased strength, endurance, and mobility     Gait difficulty     Impairment of balance      Physician: Arlene Barth DO    Physician Orders: PT Eval and Treat   Medical Diagnosis from Referral: R29.898 (ICD-10-CM) - Bilateral leg weakness  Evaluation Date: 8/27/2019  Authorization Period Expiration: 12/31/2019  Plan of Care Expiration: 11/27/2019  Visit # / Visits authorized: 8/20                       PN Due: 11/6/2019     Time In: 7:16a  Time Out: 8:00a  Total Time: 44 minutes  Total Billable Time: 44 minutes    Precautions: Standard, Diabetes and Fall    Subjective     Pt reports: he is late because he got off work around 1 am and fell asleep and woke up late. He states his legs have been feeling fine.      He was compliant with home exercise program.  Response to previous treatmen: no increased pain/discomfort  Functional change: none reported    Pain: 0/10  Location: bilateral legs      Objective     Abel received therapeutic exercises to develop strength, endurance and ROM for 36 minutes including:    Nustep x7 min  Treadmill walking to increase endurance x10 min    Gastroc stretch on wedge [id number]  Bridges 2x10  LAQs 2x15, 3#  Hamstring Stretch 3x30 ea  Clamshells, RTB, 2x10   Hip adduction ball squeeze 2x10, 3 sec hold   Standing marching, 2#,  2x20   Standing hamstring curls, 3#, 2x15  Standing heel/toe raise, 3#, 3x10  Mini Squat taps to 24 in plyo box x10  Sit to stands from chair 2x10  Shuttle squat 3x10, 2 black cord   +Static Lunges x10 ea leg   Step ups on 6 in, 2x15 ea   +Standing hip extension/abduction with YTB 2x10     Abel participated in neuromuscular re-education activities to improve: Balance, Coordination and Proprioception for 8 minutes. The following activities were included:    Balance on  firm surface:   Tandem standing 3x30 ea  Foam surface:    Standing feet together, EC [id number]    Standing feet together, EO 3x30 sec   Standing feet together horizontal/vertical head turns 3x30 sec ea   Walking over 6 in + 10 in  hurdles in // bars x5 rounds fwd/sideways   Side steps in // bars, YTB x4 rounds   Walking backwards x 25 feet x4 laps  Standing on wedge into DF and PF - with ball throws - 1x1 min ea     Home Exercises Provided and Patient Education Provided     Education provided:   Cont to perform HEP as provided.     Written Home Exercises Provided: Patient instructed to cont prior HEP.  Exercises were reviewed and Abel was able to demonstrate them prior to the end of the session.  Abel demonstrated good  understanding of the education provided.     See EMR under Patient Instructions for exercises provided prior visit.    Assessment     Pt tolerated treatment well overall with no adverse affects. Pt was late to therapy. He did not require as many rest breaks this session and had no increase in leg pain. He was more tolerable to step ups today with improved muscular and cardiovascular endurance. Continue progressing intensity of exercises next session.      Abel is progressing well towards his goals.   Pt prognosis is Good.     Pt will continue to benefit from skilled outpatient physical therapy to address the deficits listed in the problem list box on initial evaluation, provide pt/family education and to maximize pt's level of independence in the home and community environment.   Pt's spiritual, cultural and educational needs considered and pt agreeable to plan of care and goals.    Anticipated barriers to physical therapy: none    Goals:  Short Term Goals: 4 weeks   1. Pt will be independent with his HEP in order to demonstrate self-management of his condition. - Goal met   2. Pt will demonstrate an increase in BLE muscle strength by 1/3 muscle grade in order to improve functional mobility.  progressing  3. Pt will be able to perform 5 time sit to  20 seconds to demonstrate increased LE strength, coordination, and ease with transfers. Goal met   4. Pt will demonstrate increased gait speed during 10 meter walk with safety and no LOB to 1.2m/s in order to ambulate in community more efficiently. Goal met   5. Pt will be able to walk 2 blocks with < or = to minimal difficulty in order to improve quality of life. Goal met      Long Term Goals: 8 weeks   1. Pt will demonstrate in increase in BLE muscle strength by 1 muscle grade in order to improve functional mobility. progressing  3. Pt will be able to perform 5 time sit to  17 seconds to demonstrate increased LE strength, coordination, and ease with transfers. Goal Met   4. Pt will demonstrate increased gait speed during 10 meter walk with safety and no LOB to 1.3m/s in order to ambulate in community more efficiently. Goal met   5. Pt will demonstrate an increased FGA score to > or = to 23/30 to demonstrate decrease risk of falls. progressing  6. Pt will be able to perform his usual work or housework activities with < or = to minimal difficulty in order to participate fully in his life. Goal Met   New goal: Pt will be able to perform lifting 10# objects from the 12 in step with appropriate body mechanics in order to improve effectiveness when he returns to work. Ongoing     Plan     Certification date: 8/27/2019 - 11/27/2019    Cont skilled PT session towards PT and patient's goals. Continue progression of LE strengthening and balance as tolerated    Mely Angel, PT, DPT  10/30/2019

## 2019-10-31 LAB — PHOSPHATIDYLETHANOL (PETH): NEGATIVE NG/ML

## 2019-11-06 ENCOUNTER — CLINICAL SUPPORT (OUTPATIENT)
Dept: REHABILITATION | Facility: HOSPITAL | Age: 54
End: 2019-11-06
Attending: PSYCHIATRY & NEUROLOGY
Payer: COMMERCIAL

## 2019-11-06 DIAGNOSIS — R53.1 DECREASED STRENGTH, ENDURANCE, AND MOBILITY: ICD-10-CM

## 2019-11-06 DIAGNOSIS — R29.898 LEG WEAKNESS, BILATERAL: ICD-10-CM

## 2019-11-06 DIAGNOSIS — R26.89 IMPAIRMENT OF BALANCE: ICD-10-CM

## 2019-11-06 DIAGNOSIS — R68.89 DECREASED STRENGTH, ENDURANCE, AND MOBILITY: ICD-10-CM

## 2019-11-06 DIAGNOSIS — R26.9 GAIT DIFFICULTY: ICD-10-CM

## 2019-11-06 DIAGNOSIS — Z74.09 DECREASED STRENGTH, ENDURANCE, AND MOBILITY: ICD-10-CM

## 2019-11-06 PROCEDURE — 97110 THERAPEUTIC EXERCISES: CPT | Mod: PN

## 2019-11-06 NOTE — PROGRESS NOTES
Physical Therapy Daily Treatment Note     Name: Abel Morris  Clinic Number: [id number]    Therapy Diagnosis:   Encounter Diagnoses   Name Primary?    Leg weakness, bilateral     Decreased strength, endurance, and mobility     Gait difficulty     Impairment of balance      Physician: Arlene Barth DO    Physician Orders: PT Eval and Treat   Medical Diagnosis from Referral: R29.898 (ICD-10-CM) - Bilateral leg weakness  Evaluation Date: 8/27/2019  Authorization Period Expiration: 12/31/2019  Plan of Care Expiration: 11/27/2019  Visit # / Visits authorized: 9/20                       PN Due: 11/6/2019     Time In: 7:01a  Time Out: 7:58a  Total Time: 57 minutes  Total Billable Time: 30 minutes    Precautions: Standard, Diabetes and Fall    Subjective     Pt reports: he has been feeling really good. He states he is also getting used to the shifts at work.      He was compliant with home exercise program.  Response to previous treatmen: no increased pain/discomfort  Functional change: none reported    Pain: 0/10  Location: bilateral legs      Objective     Lower Extremity Strength  Right LE   Left LE     Knee extension: 4/5 Knee extension: 4+/5   Knee flexion: 4/5 Knee flexion: 4/5   Hip flexion: 4/5 Hip flexion: 4/5   Hip extension:  4/5 Hip extension: 4/5   Hip abduction: 4-/5 Hip abduction: 4-/5   Hip ER 4/5 Hip ER 4-5   Hip IR 4/5 Hip IR 4+/5   Hip adduction: 3/5 Hip adduction 3/5   Ankle dorsiflexion: 4-/5 Ankle dorsiflexion: 4/5   Ankle plantarflexion: 3-/5 Ankle plantarflexion: 3-/5      Special Tests:   -FGA score: 25/30   -Single Limb Balance: fair strategies - 5 seconds on R, 5 seconds on L   -10 meter walk test: 1.6m/s - normal pace, 2m/s - fast pace   -5 time sit to stand: 12 seconds    Abel received therapeutic exercises to develop strength, endurance and ROM for 57 minutes including:    Nustep x7 min  Treadmill walking to increase endurance x10 min    Gastroc stretch on wedge  [id number]  Standing marching, 2#,  2x20   Standing hamstring curls, 3#, 2x15  Standing heel/toe raise, 3#, 3x10  Mini Squat taps to 24 in plyo box x10  Sit to stands from chair 2x10  Shuttle squat 3x10, 2 black cord   Static Lunges x10 ea leg   Step ups on 6 in, 2x15 ea   Standing hip extension/abduction with YTB 2x10     Abel participated in neuromuscular re-education activities to improve: Balance, Coordination and Proprioception for 00 minutes. The following activities were included:    Balance on firm surface:   Tandem standing 3x30 ea  Foam surface:    Standing feet together, EC [id number]    Standing feet together, EO 3x30 sec   Standing feet together horizontal/vertical head turns 3x30 sec ea   Walking over 6 in + 10 in  hurdles in // bars x5 rounds fwd/sideways   Side steps in // bars, YTB x4 rounds     Home Exercises Provided and Patient Education Provided     Education provided:   Cont to perform HEP as provided.     Written Home Exercises Provided: Patient instructed to cont prior HEP.  Exercises were reviewed and Abel was able to demonstrate them prior to the end of the session.  Abel demonstrated good  understanding of the education provided.     See EMR under Patient Instructions for exercises provided prior visit.    Assessment     Pt tolerated treatment well overall with no adverse affects. A progress note was performed today. He has met 9 goals as of today and is progressing well towards other goals. He has improved his dynamic balance and balance strategies. He continues improving his BLE strength (L > R) and endurance. He has decreased pain and has returned to work with no adverse effects. He would continue to benefit from strength and higher level balance training in order to safely incorporate further functional mobility into his daily routine. Pt continues with difficulty performing single leg stance and tandem stance. Pt was given updated HEP today and instructed to continue with self-management  at home.  Continue with POC and progress as able and tolerated.     Abel is progressing well towards his goals.   Pt prognosis is Good.     Pt will continue to benefit from skilled outpatient physical therapy to address the deficits listed in the problem list box on initial evaluation, provide pt/family education and to maximize pt's level of independence in the home and community environment.   Pt's spiritual, cultural and educational needs considered and pt agreeable to plan of care and goals.    Anticipated barriers to physical therapy: none    Goals:  Short Term Goals: 4 weeks   1. Pt will be independent with his HEP in order to demonstrate self-management of his condition. - Goal met   2. Pt will demonstrate an increase in BLE muscle strength by 1/3 muscle grade in order to improve functional mobility. Goal met   3. Pt will be able to perform 5 time sit to  20 seconds to demonstrate increased LE strength, coordination, and ease with transfers. Goal met   4. Pt will demonstrate increased gait speed during 10 meter walk with safety and no LOB to 1.2m/s in order to ambulate in community more efficiently. Goal met   5. Pt will be able to walk 2 blocks with < or = to minimal difficulty in order to improve quality of life. Goal met      Long Term Goals: 8 weeks   1. Pt will demonstrate in increase in BLE muscle strength by 1 muscle grade in order to improve functional mobility. progressing  3. Pt will be able to perform 5 time sit to  17 seconds to demonstrate increased LE strength, coordination, and ease with transfers. Goal Met   4. Pt will demonstrate increased gait speed during 10 meter walk with safety and no LOB to 1.3m/s in order to ambulate in community more efficiently. Goal met   5. Pt will demonstrate an increased FGA score to > or = to 23/30 to demonstrate decrease risk of falls. Goal met  6. Pt will be able to perform his usual work or housework activities with < or = to minimal  difficulty in order to participate fully in his life. Goal Met   New goal: Pt will be able to perform lifting 10# objects from the 12 in step with appropriate body mechanics in order to improve effectiveness when he returns to work. Ongoing     Plan     Certification date: 8/27/2019 - 11/27/2019    Cont skilled PT session towards PT and patient's goals. Continue progression of LE strengthening and balance as tolerated    Mely Angel, PT, DPT  11/06/2019

## 2019-11-13 ENCOUNTER — CLINICAL SUPPORT (OUTPATIENT)
Dept: REHABILITATION | Facility: HOSPITAL | Age: 54
End: 2019-11-13
Attending: PSYCHIATRY & NEUROLOGY
Payer: COMMERCIAL

## 2019-11-13 DIAGNOSIS — R53.1 DECREASED STRENGTH, ENDURANCE, AND MOBILITY: ICD-10-CM

## 2019-11-13 DIAGNOSIS — R26.9 GAIT DIFFICULTY: ICD-10-CM

## 2019-11-13 DIAGNOSIS — R68.89 DECREASED STRENGTH, ENDURANCE, AND MOBILITY: ICD-10-CM

## 2019-11-13 DIAGNOSIS — R29.898 LEG WEAKNESS, BILATERAL: ICD-10-CM

## 2019-11-13 DIAGNOSIS — R26.89 IMPAIRMENT OF BALANCE: ICD-10-CM

## 2019-11-13 DIAGNOSIS — Z74.09 DECREASED STRENGTH, ENDURANCE, AND MOBILITY: ICD-10-CM

## 2019-11-13 PROCEDURE — 97110 THERAPEUTIC EXERCISES: CPT | Mod: PN

## 2019-11-13 PROCEDURE — 97112 NEUROMUSCULAR REEDUCATION: CPT | Mod: PN

## 2019-11-13 NOTE — PROGRESS NOTES
Physical Therapy Daily Treatment Note     Name: Abel Holcomb [place]  Clinic Number: [id number]    Therapy Diagnosis:   Encounter Diagnoses   Name Primary?    Leg weakness, bilateral     Decreased strength, endurance, and mobility     Gait difficulty     Impairment of balance      Physician: Arlene Barth DO    Physician Orders: PT Eval and Treat   Medical Diagnosis from Referral: R29.898 (ICD-10-CM) - Bilateral leg weakness  Evaluation Date: 8/27/2019  Authorization Period Expiration: 12/31/2019  Plan of Care Expiration: 11/27/2019  Visit # / Visits authorized: 10/20                       PN Due: 12/6/2019     Time In: 6:55a  Time Out: 7:57a  Total Time: 62 minutes  Total Billable Time: 30 minutes    Precautions: Standard, Diabetes and Fall    Subjective     Pt reports: he feels good, no new complaints     He was compliant with home exercise program.  Response to previous treatment: good  Functional change: none reported    Pain: 0/10  Location: bilateral legs      Objective     Abel received therapeutic exercises to develop strength, endurance and ROM for 44 minutes including:    Nustep x7 min  Treadmill walking to increase endurance x7 min, 1.3 mph  Gastroc stretch on wedge [id number]  Standing marching, 3#,  2x20   Standing hip abduction/extension, YTB, 2x15 - no UE support  Standing hamstring curls, 3#, 2x15 - no UE support  Standing heel/toe raise, 3#, 3x10  Mini Squat taps to 24 in plyo box with shoulder press, 10 DB 3x10  Sit to stands from chair 2x10  Static Lunges x10 ea leg   Step ups on 6 in, 2x15 ea - no UE support    Abel participated in neuromuscular re-education activities to improve: Balance, Coordination and Proprioception for 18 minutes. The following activities were included:    Balance on firm surface:   Tandem standing 3x30 ea  Foam surface:    Standing feet together, EC [id number]    Standing feet together horizontal/vertical head turns 3x30 sec ea   Walking over 6 in + 10 in  hurdles  in // bars x5 rounds fwd/sideways   Side steps in // bars, YTB x2 laps    Toe walking, 2 laps     Home Exercises Provided and Patient Education Provided     Education provided:   Cont to perform HEP as provided.     Written Home Exercises Provided: Patient instructed to cont prior HEP.  Exercises were reviewed and Abel was able to demonstrate them prior to the end of the session.  Abel demonstrated good  understanding of the education provided.     See EMR under Patient Instructions for exercises provided prior visit.    Assessment     Pt tolerated treatment well overall with no adverse affects. Today, started pt on the treadmill with good response, demonstrating mild decrease in endurance. Pt performed all standing activities with appropriate rest breaks as needed. He continues with unsteady gait pattern due to decreased proprioception and sensation in bilateral feet. He would benefit from further proprioceptive and coordination exercises. Pt educated on sensory increasing tactics he can perform on bilateral feet, pt with good understanding. Pt with moderate compliance with HEP and instruction. Pt would continue to benefit from skilled physical therapy intervention. Consider including further dynamic balance activities.     Abel is progressing well towards his goals.   Pt prognosis is Good.     Pt will continue to benefit from skilled outpatient physical therapy to address the deficits listed in the problem list box on initial evaluation, provide pt/family education and to maximize pt's level of independence in the home and community environment.   Pt's spiritual, cultural and educational needs considered and pt agreeable to plan of care and goals.    Anticipated barriers to physical therapy: none    Goals:  Short Term Goals: 4 weeks   1. Pt will be independent with his HEP in order to demonstrate self-management of his condition. - Goal met   2. Pt will demonstrate an increase in BLE muscle strength by 1/3  muscle grade in order to improve functional mobility. Goal met   3. Pt will be able to perform 5 time sit to  20 seconds to demonstrate increased LE strength, coordination, and ease with transfers. Goal met   4. Pt will demonstrate increased gait speed during 10 meter walk with safety and no LOB to 1.2m/s in order to ambulate in community more efficiently. Goal met   5. Pt will be able to walk 2 blocks with < or = to minimal difficulty in order to improve quality of life. Goal met      Long Term Goals: 8 weeks   1. Pt will demonstrate in increase in BLE muscle strength by 1 muscle grade in order to improve functional mobility. progressing  3. Pt will be able to perform 5 time sit to  17 seconds to demonstrate increased LE strength, coordination, and ease with transfers. Goal Met   4. Pt will demonstrate increased gait speed during 10 meter walk with safety and no LOB to 1.3m/s in order to ambulate in community more efficiently. Goal met   5. Pt will demonstrate an increased FGA score to > or = to 23/30 to demonstrate decrease risk of falls. Goal met  6. Pt will be able to perform his usual work or housework activities with < or = to minimal difficulty in order to participate fully in his life. Goal Met   New goal: Pt will be able to perform lifting 10# objects from the 12 in step with appropriate body mechanics in order to improve effectiveness when he returns to work. Ongoing     Plan     Certification date: 8/27/2019 - 11/27/2019    Cont skilled PT session towards PT and patient's goals. Continue progression of LE strengthening and balance as tolerated    Mely Angel, PT, DPT  11/13/2019

## 2019-11-19 ENCOUNTER — CLINICAL SUPPORT (OUTPATIENT)
Dept: REHABILITATION | Facility: HOSPITAL | Age: 54
End: 2019-11-19
Attending: PSYCHIATRY & NEUROLOGY
Payer: COMMERCIAL

## 2019-11-19 DIAGNOSIS — R26.89 IMPAIRMENT OF BALANCE: ICD-10-CM

## 2019-11-19 DIAGNOSIS — Z74.09 DECREASED STRENGTH, ENDURANCE, AND MOBILITY: ICD-10-CM

## 2019-11-19 DIAGNOSIS — R68.89 DECREASED STRENGTH, ENDURANCE, AND MOBILITY: ICD-10-CM

## 2019-11-19 DIAGNOSIS — R29.898 LEG WEAKNESS, BILATERAL: ICD-10-CM

## 2019-11-19 DIAGNOSIS — R26.9 GAIT DIFFICULTY: ICD-10-CM

## 2019-11-19 DIAGNOSIS — R53.1 DECREASED STRENGTH, ENDURANCE, AND MOBILITY: ICD-10-CM

## 2019-11-19 PROCEDURE — 97110 THERAPEUTIC EXERCISES: CPT | Mod: PN

## 2019-11-19 PROCEDURE — 97112 NEUROMUSCULAR REEDUCATION: CPT | Mod: PN

## 2019-11-19 NOTE — PROGRESS NOTES
Physical Therapy Daily Treatment Note     Name: Abel Doniphan [place]  Clinic Number: [id number]    Therapy Diagnosis:   Encounter Diagnoses   Name Primary?    Leg weakness, bilateral     Decreased strength, endurance, and mobility     Gait difficulty     Impairment of balance      Physician: Arlene Barth DO    Physician Orders: PT Eval and Treat   Medical Diagnosis from Referral: R29.898 (ICD-10-CM) - Bilateral leg weakness  Evaluation Date: 8/27/2019  Authorization Period Expiration: 12/31/2019  Plan of Care Expiration: 11/27/2019  Visit # / Visits authorized: 11/20                       PN Due: 12/6/2019     Time In: 7:00a  Time Out: 8:02a  Total Time: 62 minutes  Total Billable Time: 30 minutes    Precautions: Standard, Diabetes and Fall    Subjective     Pt reports: he feels good, no new complaints     He was compliant with home exercise program.  Response to previous treatment: good  Functional change: none reported    Pain: 0/10  Location: bilateral legs      Objective     Abel received therapeutic exercises to develop strength, endurance and ROM for 40 minutes including:    Nustep x7 min, level 2  Treadmill walking to increase endurance x7 min, 1.4 mph  Gastroc stretch on wedge [id number]  Standing marching on foam, 3#,  2x20   Standing hip abduction/extension, YTB, 2x15 - no UE support  Matrix hamstring curls, 45#, 2x15   Matrix knee extension, 35#, 2x15   Standing heel/toe raise, 3#, 3x10  Mini Squat taps to 24 in plyo box with shoulder press, 10 DB 3x10  Sit to stands from chair 2x10  Static Lunges x10 ea leg   Step ups on 6 in, 2x15 ea - no UE support    Abel participated in neuromuscular re-education activities to improve: Balance, Coordination and Proprioception for 22 minutes. The following activities were included:    Balance on firm surface:   Tandem standing 3x30 ea  Foam surface:    Standing feet together, EC [id number]    Standing feet together horizontal/vertical head turns 3x30 sec ea    Walking over 6 in + 10 in  hurdles in // bars x5 rounds fwd/sideways   Side steps, YTB x2 laps    Toe/heel walking, 2 laps  +Standing marble  x1 BLE     Home Exercises Provided and Patient Education Provided     Education provided:   Cont to perform HEP as provided.     Written Home Exercises Provided: Patient instructed to cont prior HEP.  Exercises were reviewed and Abel was able to demonstrate them prior to the end of the session.  Abel demonstrated good  understanding of the education provided.     See EMR under Patient Instructions for exercises provided prior visit.    Assessment     Pt tolerated treatment well overall with no adverse affects. Pt had improved endurance this session with decreased rest breaks required. Pt had appropriate post-treatment fatigue. Attempted performing marble  with feet, but patient was not willing to take his shoes off to participate. Pt would continue to benefit from skilled physical therapy in order to improve proprioception in ankles and feet as well as increase overall BLE strength and endurance.     Abel is progressing well towards his goals.   Pt prognosis is Good.     Pt will continue to benefit from skilled outpatient physical therapy to address the deficits listed in the problem list box on initial evaluation, provide pt/family education and to maximize pt's level of independence in the home and community environment.   Pt's spiritual, cultural and educational needs considered and pt agreeable to plan of care and goals.    Anticipated barriers to physical therapy: none    Goals:  Short Term Goals: 4 weeks   1. Pt will be independent with his HEP in order to demonstrate self-management of his condition. - Goal met   2. Pt will demonstrate an increase in BLE muscle strength by 1/3 muscle grade in order to improve functional mobility. Goal met   3. Pt will be able to perform 5 time sit to  20 seconds to demonstrate increased LE strength,  coordination, and ease with transfers. Goal met   4. Pt will demonstrate increased gait speed during 10 meter walk with safety and no LOB to 1.2m/s in order to ambulate in community more efficiently. Goal met   5. Pt will be able to walk 2 blocks with < or = to minimal difficulty in order to improve quality of life. Goal met      Long Term Goals: 8 weeks   1. Pt will demonstrate in increase in BLE muscle strength by 1 muscle grade in order to improve functional mobility. progressing  3. Pt will be able to perform 5 time sit to  17 seconds to demonstrate increased LE strength, coordination, and ease with transfers. Goal Met   4. Pt will demonstrate increased gait speed during 10 meter walk with safety and no LOB to 1.3m/s in order to ambulate in community more efficiently. Goal met   5. Pt will demonstrate an increased FGA score to > or = to 23/30 to demonstrate decrease risk of falls. Goal met  6. Pt will be able to perform his usual work or housework activities with < or = to minimal difficulty in order to participate fully in his life. Goal Met   New goal: Pt will be able to perform lifting 10# objects from the 12 in step with appropriate body mechanics in order to improve effectiveness when he returns to work. Ongoing     Plan     Certification date: 8/27/2019 - 11/27/2019    Cont skilled PT session towards PT and patient's goals. Continue progression of LE strengthening and balance as tolerated    Mely Angel, PT, DPT  11/19/2019

## 2019-11-22 ENCOUNTER — PATIENT OUTREACH (OUTPATIENT)
Dept: ADMINISTRATIVE | Facility: OTHER | Age: 54
End: 2019-11-22

## 2019-11-22 DIAGNOSIS — E11.9 TYPE 2 DIABETES MELLITUS WITHOUT COMPLICATION, UNSPECIFIED WHETHER LONG TERM INSULIN USE: Primary | ICD-10-CM

## 2020-02-07 ENCOUNTER — DOCUMENTATION ONLY (OUTPATIENT)
Dept: REHABILITATION | Facility: HOSPITAL | Age: 55
End: 2020-02-07

## 2020-02-07 PROBLEM — R26.9 GAIT DIFFICULTY: Status: RESOLVED | Noted: 2019-08-27 | Resolved: 2020-02-07

## 2020-02-07 PROBLEM — R68.89 DECREASED STRENGTH, ENDURANCE, AND MOBILITY: Status: RESOLVED | Noted: 2019-08-27 | Resolved: 2020-02-07

## 2020-02-07 PROBLEM — Z74.09 DECREASED STRENGTH, ENDURANCE, AND MOBILITY: Status: RESOLVED | Noted: 2019-08-27 | Resolved: 2020-02-07

## 2020-02-07 PROBLEM — R29.898 LEG WEAKNESS, BILATERAL: Status: RESOLVED | Noted: 2019-08-27 | Resolved: 2020-02-07

## 2020-02-07 PROBLEM — R53.1 DECREASED STRENGTH, ENDURANCE, AND MOBILITY: Status: RESOLVED | Noted: 2019-08-27 | Resolved: 2020-02-07

## 2020-02-07 PROBLEM — R26.89 IMPAIRMENT OF BALANCE: Status: RESOLVED | Noted: 2019-08-27 | Resolved: 2020-02-07

## 2020-02-07 NOTE — PROGRESS NOTES
Outpatient Therapy Discharge Summary     Name: Abel Morris  Clinic Number: [id number]    Therapy Diagnosis: No diagnosis found.  Physician: No ref. provider found    Physician Orders: PT Eval and Treat   Medical Diagnosis from Referral: R29.898 (ICD-10-CM) - Bilateral leg weakness  Evaluation Date: 8/27/2019    Date of Last visit: 11/19/2019  Total Visits Received: 11  Cancelled Visits: 1  No Show Visits: 2    Assessment    Goals:  Short Term Goals: 4 weeks   1. Pt will be independent with his HEP in order to demonstrate self-management of his condition. - Goal met   2. Pt will demonstrate an increase in BLE muscle strength by 1/3 muscle grade in order to improve functional mobility. Goal met   3. Pt will be able to perform 5 time sit to  20 seconds to demonstrate increased LE strength, coordination, and ease with transfers. Goal met   4. Pt will demonstrate increased gait speed during 10 meter walk with safety and no LOB to 1.2m/s in order to ambulate in community more efficiently. Goal met   5. Pt will be able to walk 2 blocks with < or = to minimal difficulty in order to improve quality of life. Goal met      Long Term Goals: 8 weeks   1. Pt will demonstrate in increase in BLE muscle strength by 1 muscle grade in order to improve functional mobility. Goal not met   3. Pt will be able to perform 5 time sit to  17 seconds to demonstrate increased LE strength, coordination, and ease with transfers. Goal Met   4. Pt will demonstrate increased gait speed during 10 meter walk with safety and no LOB to 1.3m/s in order to ambulate in community more efficiently. Goal met   5. Pt will demonstrate an increased FGA score to > or = to 23/30 to demonstrate decrease risk of falls. Goal met  6. Pt will be able to perform his usual work or housework activities with < or = to minimal difficulty in order to participate fully in his life. Goal Met   New goal: Pt will be able to perform lifting 10# objects  from the 12 in step with appropriate body mechanics in order to improve effectiveness when he returns to work. Goal not assessed    Discharge reason: Patient has not attended therapy since 11/19/2019    Plan   This patient is discharged from Physical Therapy

## 2020-05-18 ENCOUNTER — PATIENT OUTREACH (OUTPATIENT)
Dept: ADMINISTRATIVE | Facility: HOSPITAL | Age: 55
End: 2020-05-18

## 2020-08-14 DIAGNOSIS — Z11.59 NEED FOR HEPATITIS C SCREENING TEST: ICD-10-CM

## 2020-12-04 DIAGNOSIS — E11.42 DIABETIC POLYNEUROPATHY ASSOCIATED WITH TYPE 2 DIABETES MELLITUS: ICD-10-CM

## 2020-12-17 ENCOUNTER — PATIENT OUTREACH (OUTPATIENT)
Dept: ADMINISTRATIVE | Facility: HOSPITAL | Age: 55
End: 2020-12-17

## 2021-01-11 ENCOUNTER — PATIENT OUTREACH (OUTPATIENT)
Dept: ADMINISTRATIVE | Facility: HOSPITAL | Age: 56
End: 2021-01-11

## 2021-01-11 DIAGNOSIS — Z00.00 ROUTINE MEDICAL EXAM: ICD-10-CM

## 2021-01-11 DIAGNOSIS — E78.5 HYPERLIPIDEMIA, UNSPECIFIED HYPERLIPIDEMIA TYPE: Primary | ICD-10-CM

## 2021-01-11 DIAGNOSIS — E11.65 UNCONTROLLED TYPE 2 DIABETES MELLITUS WITH HYPERGLYCEMIA: ICD-10-CM

## 2021-02-01 ENCOUNTER — HOSPITAL ENCOUNTER (EMERGENCY)
Facility: HOSPITAL | Age: 56
Discharge: HOME | End: 2021-02-01
Attending: EMERGENCY MEDICINE
Payer: COMMERCIAL

## 2021-02-01 VITALS
HEART RATE: 100 BPM | RESPIRATION RATE: 18 BRPM | TEMPERATURE: 99 F | DIASTOLIC BLOOD PRESSURE: 83 MMHG | BODY MASS INDEX: 29.84 KG/M2 | WEIGHT: 240 LBS | OXYGEN SATURATION: 100 % | SYSTOLIC BLOOD PRESSURE: 163 MMHG | HEIGHT: 75 IN

## 2021-02-01 DIAGNOSIS — M79.89 LEG SWELLING: Primary | ICD-10-CM

## 2021-02-01 DIAGNOSIS — L03.90 CELLULITIS, UNSPECIFIED CELLULITIS SITE: ICD-10-CM

## 2021-02-01 DIAGNOSIS — S91.109A OPEN TOE WOUND, INITIAL ENCOUNTER: ICD-10-CM

## 2021-02-01 LAB
ALBUMIN SERPL-MCNC: 3.7 G/DL (ref 3.3–5.5)
ALP SERPL-CCNC: 65 U/L (ref 42–141)
BILIRUB SERPL-MCNC: 0.6 MG/DL (ref 0.2–1.6)
BUN SERPL-MCNC: 11 MG/DL (ref 7–22)
CALCIUM SERPL-MCNC: 10.2 MG/DL (ref 8–10.3)
CHLORIDE SERPL-SCNC: 101 MMOL/L (ref 98–108)
CREAT SERPL-MCNC: 1 MG/DL (ref 0.6–1.2)
GLUCOSE SERPL-MCNC: 251 MG/DL (ref 73–118)
GLUCOSE SERPL-MCNC: 262 MG/DL (ref 70–110)
POC ALT (SGPT): 36 U/L (ref 10–47)
POC AST (SGOT): 37 U/L (ref 11–38)
POC TCO2: 26 MMOL/L (ref 18–33)
POTASSIUM BLD-SCNC: 4.5 MMOL/L (ref 3.6–5.1)
PROTEIN, POC: 7.2 G/DL (ref 6.4–8.1)
SODIUM BLD-SCNC: 137 MMOL/L (ref 128–145)

## 2021-02-01 PROCEDURE — 99285 EMERGENCY DEPT VISIT HI MDM: CPT | Mod: 25,ER

## 2021-02-01 PROCEDURE — 85025 COMPLETE CBC W/AUTO DIFF WBC: CPT | Mod: ER

## 2021-02-01 PROCEDURE — 80053 COMPREHEN METABOLIC PANEL: CPT | Mod: ER

## 2021-02-01 RX ORDER — CLINDAMYCIN HYDROCHLORIDE 150 MG/1
300 CAPSULE ORAL EVERY 8 HOURS
Qty: 42 CAPSULE | Refills: 0 | Status: SHIPPED | OUTPATIENT
Start: 2021-02-01 | End: 2021-02-08

## 2021-02-02 ENCOUNTER — PATIENT MESSAGE (OUTPATIENT)
Dept: FAMILY MEDICINE | Facility: CLINIC | Age: 56
End: 2021-02-02

## 2021-02-04 ENCOUNTER — OFFICE VISIT (OUTPATIENT)
Dept: OPTOMETRY | Facility: CLINIC | Age: 56
End: 2021-02-04
Payer: COMMERCIAL

## 2021-02-04 ENCOUNTER — PATIENT MESSAGE (OUTPATIENT)
Dept: FAMILY MEDICINE | Facility: CLINIC | Age: 56
End: 2021-02-04

## 2021-02-04 DIAGNOSIS — E11.9 TYPE 2 DIABETES MELLITUS WITHOUT RETINOPATHY: Primary | ICD-10-CM

## 2021-02-04 DIAGNOSIS — H52.7 REFRACTIVE ERROR: ICD-10-CM

## 2021-02-04 DIAGNOSIS — R60.9 EDEMA, UNSPECIFIED TYPE: Primary | ICD-10-CM

## 2021-02-04 LAB
LEFT EYE DM RETINOPATHY: NEGATIVE
RIGHT EYE DM RETINOPATHY: NEGATIVE

## 2021-02-04 PROCEDURE — 92004 PR EYE EXAM, NEW PATIENT,COMPREHESV: ICD-10-PCS | Mod: S$GLB,,, | Performed by: OPTOMETRIST

## 2021-02-04 PROCEDURE — 99999 PR PBB SHADOW E&M-EST. PATIENT-LVL III: CPT | Mod: PBBFAC,,, | Performed by: OPTOMETRIST

## 2021-02-04 PROCEDURE — 92015 DETERMINE REFRACTIVE STATE: CPT | Mod: S$GLB,,, | Performed by: OPTOMETRIST

## 2021-02-04 PROCEDURE — 92015 PR REFRACTION: ICD-10-PCS | Mod: S$GLB,,, | Performed by: OPTOMETRIST

## 2021-02-04 PROCEDURE — 92004 COMPRE OPH EXAM NEW PT 1/>: CPT | Mod: S$GLB,,, | Performed by: OPTOMETRIST

## 2021-02-04 PROCEDURE — 99999 PR PBB SHADOW E&M-EST. PATIENT-LVL III: ICD-10-PCS | Mod: PBBFAC,,, | Performed by: OPTOMETRIST

## 2021-02-05 ENCOUNTER — OFFICE VISIT (OUTPATIENT)
Dept: ENDOCRINOLOGY | Facility: CLINIC | Age: 56
End: 2021-02-05
Payer: COMMERCIAL

## 2021-02-05 ENCOUNTER — PATIENT MESSAGE (OUTPATIENT)
Dept: FAMILY MEDICINE | Facility: CLINIC | Age: 56
End: 2021-02-05

## 2021-02-05 ENCOUNTER — TELEPHONE (OUTPATIENT)
Dept: ENDOCRINOLOGY | Facility: CLINIC | Age: 56
End: 2021-02-05

## 2021-02-05 ENCOUNTER — PATIENT OUTREACH (OUTPATIENT)
Dept: ADMINISTRATIVE | Facility: OTHER | Age: 56
End: 2021-02-05

## 2021-02-05 ENCOUNTER — TELEPHONE (OUTPATIENT)
Dept: NEUROLOGY | Facility: CLINIC | Age: 56
End: 2021-02-05

## 2021-02-05 VITALS
WEIGHT: 246 LBS | BODY MASS INDEX: 30.75 KG/M2 | SYSTOLIC BLOOD PRESSURE: 122 MMHG | HEART RATE: 96 BPM | TEMPERATURE: 99 F | DIASTOLIC BLOOD PRESSURE: 80 MMHG

## 2021-02-05 DIAGNOSIS — Z78.9 ALCOHOL USE: ICD-10-CM

## 2021-02-05 DIAGNOSIS — E78.5 HYPERLIPIDEMIA, UNSPECIFIED HYPERLIPIDEMIA TYPE: ICD-10-CM

## 2021-02-05 PROCEDURE — 99214 PR OFFICE/OUTPT VISIT, EST, LEVL IV, 30-39 MIN: ICD-10-PCS | Mod: S$GLB,,, | Performed by: NURSE PRACTITIONER

## 2021-02-05 PROCEDURE — 99999 PR PBB SHADOW E&M-EST. PATIENT-LVL V: ICD-10-PCS | Mod: PBBFAC,,, | Performed by: NURSE PRACTITIONER

## 2021-02-05 PROCEDURE — 99214 OFFICE O/P EST MOD 30 MIN: CPT | Mod: S$GLB,,, | Performed by: NURSE PRACTITIONER

## 2021-02-05 PROCEDURE — 99999 PR PBB SHADOW E&M-EST. PATIENT-LVL V: CPT | Mod: PBBFAC,,, | Performed by: NURSE PRACTITIONER

## 2021-02-05 RX ORDER — PRAVASTATIN SODIUM 40 MG/1
40 TABLET ORAL DAILY
Qty: 90 TABLET | Refills: 0 | Status: SHIPPED | OUTPATIENT
Start: 2021-02-05 | End: 2021-03-05 | Stop reason: SDUPTHER

## 2021-02-05 RX ORDER — PEN NEEDLE, DIABETIC 30 GX3/16"
NEEDLE, DISPOSABLE MISCELLANEOUS
Qty: 100 EACH | Refills: 3 | Status: SHIPPED | OUTPATIENT
Start: 2021-02-05 | End: 2022-02-22

## 2021-02-05 RX ORDER — INSULIN GLARGINE 300 U/ML
INJECTION, SOLUTION SUBCUTANEOUS
Qty: 3 SYRINGE | Refills: 0 | Status: SHIPPED | OUTPATIENT
Start: 2021-02-05 | End: 2021-03-05 | Stop reason: SDUPTHER

## 2021-02-05 RX ORDER — DULAGLUTIDE 1.5 MG/.5ML
1.5 INJECTION, SOLUTION SUBCUTANEOUS
Qty: 4 PEN | Refills: 1 | Status: SHIPPED | OUTPATIENT
Start: 2021-02-05 | End: 2021-03-05

## 2021-02-05 RX ORDER — DULAGLUTIDE 0.75 MG/.5ML
0.75 INJECTION, SOLUTION SUBCUTANEOUS
Qty: 4 PEN | Refills: 0 | Status: SHIPPED | OUTPATIENT
Start: 2021-02-05 | End: 2021-03-05

## 2021-02-05 RX ORDER — METFORMIN HYDROCHLORIDE 1000 MG/1
TABLET ORAL
Qty: 180 TABLET | Refills: 0 | Status: SHIPPED | OUTPATIENT
Start: 2021-02-05 | End: 2021-03-05 | Stop reason: SDUPTHER

## 2021-02-09 ENCOUNTER — HOSPITAL ENCOUNTER (OUTPATIENT)
Dept: RADIOLOGY | Facility: HOSPITAL | Age: 56
Discharge: HOME | End: 2021-02-09
Attending: INTERNAL MEDICINE
Payer: COMMERCIAL

## 2021-02-09 DIAGNOSIS — R60.9 EDEMA, UNSPECIFIED TYPE: ICD-10-CM

## 2021-02-09 PROCEDURE — 93971 EXTREMITY STUDY: CPT | Mod: TC,RT

## 2021-02-09 PROCEDURE — 93971 EXTREMITY STUDY: CPT | Mod: 26,RT,, | Performed by: RADIOLOGY

## 2021-02-09 PROCEDURE — 93971 US LOWER EXTREMITY VEINS RIGHT: ICD-10-PCS | Mod: 26,RT,, | Performed by: RADIOLOGY

## 2021-02-10 ENCOUNTER — OFFICE VISIT (OUTPATIENT)
Dept: PODIATRY | Facility: CLINIC | Age: 56
End: 2021-02-10
Payer: COMMERCIAL

## 2021-02-10 VITALS
BODY MASS INDEX: 30.59 KG/M2 | DIASTOLIC BLOOD PRESSURE: 80 MMHG | WEIGHT: 246.06 LBS | HEIGHT: 75 IN | SYSTOLIC BLOOD PRESSURE: 118 MMHG

## 2021-02-10 DIAGNOSIS — E08.621 DIABETIC ULCER OF TOE OF RIGHT FOOT ASSOCIATED WITH DIABETES MELLITUS DUE TO UNDERLYING CONDITION, WITH FAT LAYER EXPOSED: Primary | ICD-10-CM

## 2021-02-10 DIAGNOSIS — L97.512 DIABETIC ULCER OF TOE OF RIGHT FOOT ASSOCIATED WITH DIABETES MELLITUS DUE TO UNDERLYING CONDITION, WITH FAT LAYER EXPOSED: Primary | ICD-10-CM

## 2021-02-10 DIAGNOSIS — M20.41 HAMMER TOES OF BOTH FEET: ICD-10-CM

## 2021-02-10 DIAGNOSIS — M20.42 HAMMER TOES OF BOTH FEET: ICD-10-CM

## 2021-02-10 PROCEDURE — 11042 DBRDMT SUBQ TIS 1ST 20SQCM/<: CPT | Mod: S$GLB,,, | Performed by: PODIATRIST

## 2021-02-10 PROCEDURE — 99999 PR PBB SHADOW E&M-EST. PATIENT-LVL V: CPT | Mod: PBBFAC,,, | Performed by: PODIATRIST

## 2021-02-10 PROCEDURE — 99203 OFFICE O/P NEW LOW 30 MIN: CPT | Mod: 25,S$GLB,, | Performed by: PODIATRIST

## 2021-02-10 PROCEDURE — 87075 CULTR BACTERIA EXCEPT BLOOD: CPT

## 2021-02-10 PROCEDURE — 99999 PR PBB SHADOW E&M-EST. PATIENT-LVL V: ICD-10-PCS | Mod: PBBFAC,,, | Performed by: PODIATRIST

## 2021-02-10 PROCEDURE — 11042 WOUND DEBRIDEMENT: ICD-10-PCS | Mod: S$GLB,,, | Performed by: PODIATRIST

## 2021-02-10 PROCEDURE — 87070 CULTURE OTHR SPECIMN AEROBIC: CPT

## 2021-02-10 PROCEDURE — 99203 PR OFFICE/OUTPT VISIT, NEW, LEVL III, 30-44 MIN: ICD-10-PCS | Mod: 25,S$GLB,, | Performed by: PODIATRIST

## 2021-02-11 ENCOUNTER — LAB VISIT (OUTPATIENT)
Dept: LAB | Facility: HOSPITAL | Age: 56
End: 2021-02-11
Attending: NURSE PRACTITIONER
Payer: COMMERCIAL

## 2021-02-11 DIAGNOSIS — E78.5 HYPERLIPIDEMIA, UNSPECIFIED HYPERLIPIDEMIA TYPE: ICD-10-CM

## 2021-02-11 LAB
ANION GAP SERPL CALC-SCNC: 10 MMOL/L (ref 8–16)
BUN SERPL-MCNC: 10 MG/DL (ref 6–20)
CALCIUM SERPL-MCNC: 9.5 MG/DL (ref 8.7–10.5)
CHLORIDE SERPL-SCNC: 94 MMOL/L (ref 95–110)
CHOLEST SERPL-MCNC: 227 MG/DL (ref 120–199)
CHOLEST/HDLC SERPL: 5.3 {RATIO} (ref 2–5)
CO2 SERPL-SCNC: 28 MMOL/L (ref 23–29)
CREAT SERPL-MCNC: 1.2 MG/DL (ref 0.5–1.4)
EST. GFR  (AFRICAN AMERICAN): >60 ML/MIN/1.73 M^2
EST. GFR  (NON AFRICAN AMERICAN): >60 ML/MIN/1.73 M^2
ESTIMATED AVG GLUCOSE: 203 MG/DL (ref 68–131)
GLUCOSE SERPL-MCNC: 209 MG/DL (ref 70–110)
HBA1C MFR BLD: 8.7 % (ref 4–5.6)
HDLC SERPL-MCNC: 43 MG/DL (ref 40–75)
HDLC SERPL: 18.9 % (ref 20–50)
LDLC SERPL CALC-MCNC: 134.4 MG/DL (ref 63–159)
NONHDLC SERPL-MCNC: 184 MG/DL
POTASSIUM SERPL-SCNC: 4.2 MMOL/L (ref 3.5–5.1)
SODIUM SERPL-SCNC: 132 MMOL/L (ref 136–145)
TRIGL SERPL-MCNC: 248 MG/DL (ref 30–150)

## 2021-02-11 PROCEDURE — 36415 COLL VENOUS BLD VENIPUNCTURE: CPT | Mod: PO

## 2021-02-11 PROCEDURE — 80061 LIPID PANEL: CPT

## 2021-02-11 PROCEDURE — 80048 BASIC METABOLIC PNL TOTAL CA: CPT

## 2021-02-11 PROCEDURE — 83036 HEMOGLOBIN GLYCOSYLATED A1C: CPT

## 2021-02-12 LAB — BACTERIA SPEC AEROBE CULT: NORMAL

## 2021-02-15 LAB — BACTERIA SPEC ANAEROBE CULT: NORMAL

## 2021-02-17 ENCOUNTER — PATIENT MESSAGE (OUTPATIENT)
Dept: ENDOCRINOLOGY | Facility: CLINIC | Age: 56
End: 2021-02-17

## 2021-02-17 ENCOUNTER — PATIENT MESSAGE (OUTPATIENT)
Dept: PODIATRY | Facility: CLINIC | Age: 56
End: 2021-02-17

## 2021-02-17 ENCOUNTER — PATIENT MESSAGE (OUTPATIENT)
Dept: FAMILY MEDICINE | Facility: CLINIC | Age: 56
End: 2021-02-17

## 2021-02-17 ENCOUNTER — OFFICE VISIT (OUTPATIENT)
Dept: PODIATRY | Facility: CLINIC | Age: 56
End: 2021-02-17
Payer: COMMERCIAL

## 2021-02-17 VITALS
SYSTOLIC BLOOD PRESSURE: 129 MMHG | WEIGHT: 257.69 LBS | BODY MASS INDEX: 32.04 KG/M2 | DIASTOLIC BLOOD PRESSURE: 79 MMHG | HEIGHT: 75 IN | HEART RATE: 94 BPM

## 2021-02-17 DIAGNOSIS — L97.512 DIABETIC ULCER OF TOE OF RIGHT FOOT ASSOCIATED WITH DIABETES MELLITUS DUE TO UNDERLYING CONDITION, WITH FAT LAYER EXPOSED: ICD-10-CM

## 2021-02-17 DIAGNOSIS — E08.621 DIABETIC ULCER OF TOE OF RIGHT FOOT ASSOCIATED WITH DIABETES MELLITUS DUE TO UNDERLYING CONDITION, WITH FAT LAYER EXPOSED: ICD-10-CM

## 2021-02-17 PROCEDURE — 99999 PR PBB SHADOW E&M-EST. PATIENT-LVL IV: ICD-10-PCS | Mod: PBBFAC,,, | Performed by: PODIATRIST

## 2021-02-17 PROCEDURE — 99213 PR OFFICE/OUTPT VISIT, EST, LEVL III, 20-29 MIN: ICD-10-PCS | Mod: S$GLB,,, | Performed by: PODIATRIST

## 2021-02-17 PROCEDURE — 99213 OFFICE O/P EST LOW 20 MIN: CPT | Mod: S$GLB,,, | Performed by: PODIATRIST

## 2021-02-17 PROCEDURE — 99999 PR PBB SHADOW E&M-EST. PATIENT-LVL IV: CPT | Mod: PBBFAC,,, | Performed by: PODIATRIST

## 2021-02-24 ENCOUNTER — OFFICE VISIT (OUTPATIENT)
Dept: PODIATRY | Facility: CLINIC | Age: 56
End: 2021-02-24
Payer: COMMERCIAL

## 2021-02-24 VITALS
WEIGHT: 257.69 LBS | BODY MASS INDEX: 32.04 KG/M2 | DIASTOLIC BLOOD PRESSURE: 74 MMHG | SYSTOLIC BLOOD PRESSURE: 114 MMHG | HEIGHT: 75 IN | HEART RATE: 92 BPM

## 2021-02-24 DIAGNOSIS — L97.512 DIABETIC ULCER OF TOE OF RIGHT FOOT ASSOCIATED WITH DIABETES MELLITUS DUE TO UNDERLYING CONDITION, WITH FAT LAYER EXPOSED: ICD-10-CM

## 2021-02-24 DIAGNOSIS — E08.621 DIABETIC ULCER OF TOE OF RIGHT FOOT ASSOCIATED WITH DIABETES MELLITUS DUE TO UNDERLYING CONDITION, WITH FAT LAYER EXPOSED: ICD-10-CM

## 2021-02-24 DIAGNOSIS — M20.5X2 HALLUX LIMITUS, ACQUIRED, LEFT: ICD-10-CM

## 2021-02-24 DIAGNOSIS — M20.41 HAMMER TOES OF BOTH FEET: ICD-10-CM

## 2021-02-24 DIAGNOSIS — M20.5X1 HALLUX LIMITUS, ACQUIRED, RIGHT: ICD-10-CM

## 2021-02-24 DIAGNOSIS — M20.42 HAMMER TOES OF BOTH FEET: ICD-10-CM

## 2021-02-24 PROCEDURE — 99999 PR PBB SHADOW E&M-EST. PATIENT-LVL IV: ICD-10-PCS | Mod: PBBFAC,,, | Performed by: PODIATRIST

## 2021-02-24 PROCEDURE — 99214 OFFICE O/P EST MOD 30 MIN: CPT | Mod: S$GLB,,, | Performed by: PODIATRIST

## 2021-02-24 PROCEDURE — 99999 PR PBB SHADOW E&M-EST. PATIENT-LVL IV: CPT | Mod: PBBFAC,,, | Performed by: PODIATRIST

## 2021-02-24 PROCEDURE — 99214 PR OFFICE/OUTPT VISIT, EST, LEVL IV, 30-39 MIN: ICD-10-PCS | Mod: S$GLB,,, | Performed by: PODIATRIST

## 2021-02-25 ENCOUNTER — PATIENT MESSAGE (OUTPATIENT)
Dept: ENDOCRINOLOGY | Facility: CLINIC | Age: 56
End: 2021-02-25

## 2021-02-25 ENCOUNTER — TELEPHONE (OUTPATIENT)
Dept: NEUROLOGY | Facility: CLINIC | Age: 56
End: 2021-02-25

## 2021-02-25 ENCOUNTER — OFFICE VISIT (OUTPATIENT)
Dept: NEUROLOGY | Facility: CLINIC | Age: 56
End: 2021-02-25
Payer: COMMERCIAL

## 2021-02-25 ENCOUNTER — PATIENT MESSAGE (OUTPATIENT)
Dept: NEUROLOGY | Facility: CLINIC | Age: 56
End: 2021-02-25

## 2021-02-25 VITALS
DIASTOLIC BLOOD PRESSURE: 84 MMHG | HEART RATE: 94 BPM | WEIGHT: 239.44 LBS | SYSTOLIC BLOOD PRESSURE: 123 MMHG | HEIGHT: 75 IN | BODY MASS INDEX: 29.77 KG/M2

## 2021-02-25 DIAGNOSIS — I82.813: ICD-10-CM

## 2021-02-25 DIAGNOSIS — E11.42 DIABETIC POLYNEUROPATHY ASSOCIATED WITH TYPE 2 DIABETES MELLITUS: Primary | ICD-10-CM

## 2021-02-25 DIAGNOSIS — R60.0 LOWER EXTREMITY EDEMA: ICD-10-CM

## 2021-02-25 DIAGNOSIS — M79.2 NEUROPATHIC PAIN: ICD-10-CM

## 2021-02-25 PROCEDURE — 99999 PR PBB SHADOW E&M-EST. PATIENT-LVL IV: ICD-10-PCS | Mod: PBBFAC,,, | Performed by: PSYCHIATRY & NEUROLOGY

## 2021-02-25 PROCEDURE — 99215 PR OFFICE/OUTPT VISIT, EST, LEVL V, 40-54 MIN: ICD-10-PCS | Mod: S$GLB,,, | Performed by: PSYCHIATRY & NEUROLOGY

## 2021-02-25 PROCEDURE — 99215 OFFICE O/P EST HI 40 MIN: CPT | Mod: S$GLB,,, | Performed by: PSYCHIATRY & NEUROLOGY

## 2021-02-25 PROCEDURE — 99999 PR PBB SHADOW E&M-EST. PATIENT-LVL IV: CPT | Mod: PBBFAC,,, | Performed by: PSYCHIATRY & NEUROLOGY

## 2021-02-25 RX ORDER — DULOXETIN HYDROCHLORIDE 30 MG/1
30 CAPSULE, DELAYED RELEASE ORAL DAILY
Qty: 30 CAPSULE | Refills: 0 | Status: SHIPPED | OUTPATIENT
Start: 2021-02-25 | End: 2021-03-27

## 2021-02-25 RX ORDER — MULTIVITAMIN
1 TABLET ORAL DAILY
COMMUNITY

## 2021-02-25 RX ORDER — DULOXETIN HYDROCHLORIDE 30 MG/1
30 CAPSULE, DELAYED RELEASE ORAL DAILY
Qty: 30 CAPSULE | Refills: 11 | Status: SHIPPED | OUTPATIENT
Start: 2021-02-25 | End: 2021-02-25 | Stop reason: SDUPTHER

## 2021-02-25 RX ORDER — DULOXETIN HYDROCHLORIDE 60 MG/1
60 CAPSULE, DELAYED RELEASE ORAL DAILY
Qty: 90 CAPSULE | Refills: 3 | Status: SHIPPED | OUTPATIENT
Start: 2021-03-19 | End: 2023-03-24

## 2021-03-01 ENCOUNTER — OFFICE VISIT (OUTPATIENT)
Dept: FAMILY MEDICINE | Facility: CLINIC | Age: 56
End: 2021-03-01
Payer: COMMERCIAL

## 2021-03-01 VITALS
DIASTOLIC BLOOD PRESSURE: 84 MMHG | BODY MASS INDEX: 28.37 KG/M2 | OXYGEN SATURATION: 99 % | TEMPERATURE: 98 F | WEIGHT: 240.31 LBS | HEIGHT: 77 IN | HEART RATE: 97 BPM | SYSTOLIC BLOOD PRESSURE: 124 MMHG

## 2021-03-01 DIAGNOSIS — I87.2 VENOUS INSUFFICIENCY: ICD-10-CM

## 2021-03-01 DIAGNOSIS — E11.65 UNCONTROLLED TYPE 2 DIABETES MELLITUS WITH HYPERGLYCEMIA: ICD-10-CM

## 2021-03-01 DIAGNOSIS — E78.5 HYPERLIPIDEMIA, UNSPECIFIED HYPERLIPIDEMIA TYPE: ICD-10-CM

## 2021-03-01 DIAGNOSIS — Z86.010 HISTORY OF COLONIC POLYPS: ICD-10-CM

## 2021-03-01 DIAGNOSIS — E11.59 DIABETES WITH CIRCULATORY DISORDER CAUSING ERECTILE DYSFUNCTION: ICD-10-CM

## 2021-03-01 DIAGNOSIS — I83.11 VARICOSE VEINS OF RIGHT LOWER EXTREMITY WITH INFLAMMATION: ICD-10-CM

## 2021-03-01 DIAGNOSIS — Z79.4 LONG-TERM INSULIN USE: ICD-10-CM

## 2021-03-01 DIAGNOSIS — R60.9 EDEMA, UNSPECIFIED TYPE: ICD-10-CM

## 2021-03-01 DIAGNOSIS — E11.42 DIABETIC POLYNEUROPATHY ASSOCIATED WITH TYPE 2 DIABETES MELLITUS: ICD-10-CM

## 2021-03-01 DIAGNOSIS — Z91.199 POOR COMPLIANCE: ICD-10-CM

## 2021-03-01 DIAGNOSIS — Z12.5 SCREENING FOR PROSTATE CANCER: ICD-10-CM

## 2021-03-01 DIAGNOSIS — Z00.00 ROUTINE MEDICAL EXAM: Primary | ICD-10-CM

## 2021-03-01 DIAGNOSIS — G62.9 POLYNEUROPATHY: ICD-10-CM

## 2021-03-01 DIAGNOSIS — N52.1 DIABETES WITH CIRCULATORY DISORDER CAUSING ERECTILE DYSFUNCTION: ICD-10-CM

## 2021-03-01 DIAGNOSIS — R26.9 GAIT DIFFICULTY: ICD-10-CM

## 2021-03-01 DIAGNOSIS — R29.898 BILATERAL LEG WEAKNESS: ICD-10-CM

## 2021-03-01 PROCEDURE — 99396 PR PREVENTIVE VISIT,EST,40-64: ICD-10-PCS | Mod: S$GLB,,, | Performed by: INTERNAL MEDICINE

## 2021-03-01 PROCEDURE — 99396 PREV VISIT EST AGE 40-64: CPT | Mod: S$GLB,,, | Performed by: INTERNAL MEDICINE

## 2021-03-01 PROCEDURE — 99999 PR PBB SHADOW E&M-EST. PATIENT-LVL IV: CPT | Mod: PBBFAC,,, | Performed by: INTERNAL MEDICINE

## 2021-03-01 PROCEDURE — 99999 PR PBB SHADOW E&M-EST. PATIENT-LVL IV: ICD-10-PCS | Mod: PBBFAC,,, | Performed by: INTERNAL MEDICINE

## 2021-03-03 ENCOUNTER — OFFICE VISIT (OUTPATIENT)
Dept: PODIATRY | Facility: CLINIC | Age: 56
End: 2021-03-03
Payer: COMMERCIAL

## 2021-03-03 VITALS
BODY MASS INDEX: 28.34 KG/M2 | HEIGHT: 77 IN | DIASTOLIC BLOOD PRESSURE: 74 MMHG | WEIGHT: 240 LBS | SYSTOLIC BLOOD PRESSURE: 132 MMHG

## 2021-03-03 DIAGNOSIS — M20.41 HAMMER TOES OF BOTH FEET: ICD-10-CM

## 2021-03-03 DIAGNOSIS — M20.5X1 HALLUX LIMITUS, ACQUIRED, RIGHT: ICD-10-CM

## 2021-03-03 DIAGNOSIS — M20.42 HAMMER TOES OF BOTH FEET: ICD-10-CM

## 2021-03-03 DIAGNOSIS — M20.5X2 HALLUX LIMITUS, ACQUIRED, LEFT: ICD-10-CM

## 2021-03-03 DIAGNOSIS — Z86.31 HEALED DIABETIC FOOT ULCER: Primary | ICD-10-CM

## 2021-03-03 PROCEDURE — 99213 PR OFFICE/OUTPT VISIT, EST, LEVL III, 20-29 MIN: ICD-10-PCS | Mod: S$GLB,,, | Performed by: PODIATRIST

## 2021-03-03 PROCEDURE — 99213 OFFICE O/P EST LOW 20 MIN: CPT | Mod: S$GLB,,, | Performed by: PODIATRIST

## 2021-03-03 PROCEDURE — 99999 PR PBB SHADOW E&M-EST. PATIENT-LVL III: CPT | Mod: PBBFAC,,, | Performed by: PODIATRIST

## 2021-03-03 PROCEDURE — 99999 PR PBB SHADOW E&M-EST. PATIENT-LVL III: ICD-10-PCS | Mod: PBBFAC,,, | Performed by: PODIATRIST

## 2021-03-05 ENCOUNTER — OFFICE VISIT (OUTPATIENT)
Dept: ENDOCRINOLOGY | Facility: CLINIC | Age: 56
End: 2021-03-05
Payer: COMMERCIAL

## 2021-03-05 VITALS
SYSTOLIC BLOOD PRESSURE: 127 MMHG | DIASTOLIC BLOOD PRESSURE: 80 MMHG | BODY MASS INDEX: 28.93 KG/M2 | WEIGHT: 244 LBS | TEMPERATURE: 98 F | HEART RATE: 85 BPM

## 2021-03-05 DIAGNOSIS — E78.5 HYPERLIPIDEMIA, UNSPECIFIED HYPERLIPIDEMIA TYPE: ICD-10-CM

## 2021-03-05 PROCEDURE — 99214 OFFICE O/P EST MOD 30 MIN: CPT | Mod: S$GLB,,, | Performed by: NURSE PRACTITIONER

## 2021-03-05 PROCEDURE — 99214 PR OFFICE/OUTPT VISIT, EST, LEVL IV, 30-39 MIN: ICD-10-PCS | Mod: S$GLB,,, | Performed by: NURSE PRACTITIONER

## 2021-03-05 PROCEDURE — 99999 PR PBB SHADOW E&M-EST. PATIENT-LVL III: ICD-10-PCS | Mod: PBBFAC,,, | Performed by: NURSE PRACTITIONER

## 2021-03-05 PROCEDURE — 99999 PR PBB SHADOW E&M-EST. PATIENT-LVL III: CPT | Mod: PBBFAC,,, | Performed by: NURSE PRACTITIONER

## 2021-03-05 RX ORDER — GLIMEPIRIDE 2 MG/1
2 TABLET ORAL
Qty: 90 TABLET | Refills: 0 | Status: SHIPPED | OUTPATIENT
Start: 2021-03-05 | End: 2021-05-05 | Stop reason: SDUPTHER

## 2021-03-05 RX ORDER — LANCETS
EACH MISCELLANEOUS
Qty: 200 EACH | Refills: 3 | Status: SHIPPED | OUTPATIENT
Start: 2021-03-05

## 2021-03-05 RX ORDER — DULAGLUTIDE 1.5 MG/.5ML
1.5 INJECTION, SOLUTION SUBCUTANEOUS
Qty: 4 PEN | Refills: 5 | Status: SHIPPED | OUTPATIENT
Start: 2021-03-05 | End: 2021-05-05 | Stop reason: SDUPTHER

## 2021-03-05 RX ORDER — INSULIN GLARGINE 300 U/ML
INJECTION, SOLUTION SUBCUTANEOUS
Qty: 3 SYRINGE | Refills: 1 | Status: SHIPPED | OUTPATIENT
Start: 2021-03-05 | End: 2021-05-05 | Stop reason: SDUPTHER

## 2021-03-05 RX ORDER — PRAVASTATIN SODIUM 40 MG/1
40 TABLET ORAL DAILY
Qty: 90 TABLET | Refills: 0 | Status: SHIPPED | OUTPATIENT
Start: 2021-03-05 | End: 2021-10-18 | Stop reason: SDUPTHER

## 2021-03-05 RX ORDER — METFORMIN HYDROCHLORIDE 1000 MG/1
TABLET ORAL
Qty: 180 TABLET | Refills: 2 | Status: SHIPPED | OUTPATIENT
Start: 2021-03-05 | End: 2021-05-05 | Stop reason: SDUPTHER

## 2021-03-17 ENCOUNTER — PATIENT MESSAGE (OUTPATIENT)
Dept: FAMILY MEDICINE | Facility: CLINIC | Age: 56
End: 2021-03-17

## 2021-03-24 ENCOUNTER — PATIENT OUTREACH (OUTPATIENT)
Dept: ADMINISTRATIVE | Facility: OTHER | Age: 56
End: 2021-03-24

## 2021-03-29 ENCOUNTER — PATIENT MESSAGE (OUTPATIENT)
Dept: FAMILY MEDICINE | Facility: CLINIC | Age: 56
End: 2021-03-29

## 2021-03-31 ENCOUNTER — OFFICE VISIT (OUTPATIENT)
Dept: PODIATRY | Facility: CLINIC | Age: 56
End: 2021-03-31
Payer: COMMERCIAL

## 2021-03-31 VITALS — BODY MASS INDEX: 28.81 KG/M2 | HEIGHT: 77 IN | WEIGHT: 244 LBS

## 2021-03-31 DIAGNOSIS — M20.41 HAMMER TOES OF BOTH FEET: ICD-10-CM

## 2021-03-31 DIAGNOSIS — M20.5X2 HALLUX LIMITUS, ACQUIRED, LEFT: ICD-10-CM

## 2021-03-31 DIAGNOSIS — M20.5X1 HALLUX LIMITUS, ACQUIRED, RIGHT: ICD-10-CM

## 2021-03-31 DIAGNOSIS — M20.42 HAMMER TOES OF BOTH FEET: ICD-10-CM

## 2021-03-31 DIAGNOSIS — Z86.31 HEALED DIABETIC FOOT ULCER: Primary | ICD-10-CM

## 2021-03-31 PROCEDURE — 99999 PR PBB SHADOW E&M-EST. PATIENT-LVL IV: CPT | Mod: PBBFAC,,, | Performed by: PODIATRIST

## 2021-03-31 PROCEDURE — 99213 PR OFFICE/OUTPT VISIT, EST, LEVL III, 20-29 MIN: ICD-10-PCS | Mod: S$GLB,,, | Performed by: PODIATRIST

## 2021-03-31 PROCEDURE — 99213 OFFICE O/P EST LOW 20 MIN: CPT | Mod: S$GLB,,, | Performed by: PODIATRIST

## 2021-03-31 PROCEDURE — 99999 PR PBB SHADOW E&M-EST. PATIENT-LVL IV: ICD-10-PCS | Mod: PBBFAC,,, | Performed by: PODIATRIST

## 2021-04-05 ENCOUNTER — OFFICE VISIT (OUTPATIENT)
Dept: VASCULAR SURGERY | Facility: CLINIC | Age: 56
End: 2021-04-05
Payer: COMMERCIAL

## 2021-04-05 VITALS
SYSTOLIC BLOOD PRESSURE: 114 MMHG | DIASTOLIC BLOOD PRESSURE: 76 MMHG | HEIGHT: 77 IN | WEIGHT: 237.44 LBS | BODY MASS INDEX: 28.04 KG/M2

## 2021-04-05 DIAGNOSIS — I80.9 SUPERFICIAL THROMBOPHLEBITIS, INVOLVING UNSPECIFIED SITE: ICD-10-CM

## 2021-04-05 DIAGNOSIS — I82.811 VENOUS EMBOLISM AND THROMBOSIS OF SUPERFICIAL VESSELS OF RIGHT LOWER EXTREMITY: Primary | ICD-10-CM

## 2021-04-05 DIAGNOSIS — I83.11 VARICOSE VEINS OF RIGHT LOWER EXTREMITY WITH INFLAMMATION: ICD-10-CM

## 2021-04-05 PROCEDURE — 99204 OFFICE O/P NEW MOD 45 MIN: CPT | Mod: S$GLB,,, | Performed by: SURGERY

## 2021-04-05 PROCEDURE — 99204 PR OFFICE/OUTPT VISIT, NEW, LEVL IV, 45-59 MIN: ICD-10-PCS | Mod: S$GLB,,, | Performed by: SURGERY

## 2021-04-05 PROCEDURE — 99999 PR PBB SHADOW E&M-EST. PATIENT-LVL V: ICD-10-PCS | Mod: PBBFAC,,, | Performed by: SURGERY

## 2021-04-05 PROCEDURE — 99999 PR PBB SHADOW E&M-EST. PATIENT-LVL V: CPT | Mod: PBBFAC,,, | Performed by: SURGERY

## 2021-04-06 ENCOUNTER — IMMUNIZATION (OUTPATIENT)
Dept: OBSTETRICS AND GYNECOLOGY | Facility: CLINIC | Age: 56
End: 2021-04-06
Payer: COMMERCIAL

## 2021-04-06 DIAGNOSIS — Z23 NEED FOR VACCINATION: Primary | ICD-10-CM

## 2021-04-06 PROCEDURE — 91300 COVID-19, MRNA, LNP-S, PF, 30 MCG/0.3 ML DOSE VACCINE: CPT | Mod: PBBFAC | Performed by: FAMILY MEDICINE

## 2021-04-07 ENCOUNTER — TELEPHONE (OUTPATIENT)
Dept: VASCULAR SURGERY | Facility: CLINIC | Age: 56
End: 2021-04-07

## 2021-04-27 ENCOUNTER — IMMUNIZATION (OUTPATIENT)
Dept: OBSTETRICS AND GYNECOLOGY | Facility: CLINIC | Age: 56
End: 2021-04-27

## 2021-04-27 DIAGNOSIS — Z23 NEED FOR VACCINATION: Primary | ICD-10-CM

## 2021-04-27 PROCEDURE — 91300 COVID-19, MRNA, LNP-S, PF, 30 MCG/0.3 ML DOSE VACCINE: CPT | Mod: ,,, | Performed by: FAMILY MEDICINE

## 2021-04-27 PROCEDURE — 0002A COVID-19, MRNA, LNP-S, PF, 30 MCG/0.3 ML DOSE VACCINE: ICD-10-PCS | Mod: CV19,,, | Performed by: FAMILY MEDICINE

## 2021-04-27 PROCEDURE — 91300 COVID-19, MRNA, LNP-S, PF, 30 MCG/0.3 ML DOSE VACCINE: ICD-10-PCS | Mod: ,,, | Performed by: FAMILY MEDICINE

## 2021-04-27 PROCEDURE — 0002A COVID-19, MRNA, LNP-S, PF, 30 MCG/0.3 ML DOSE VACCINE: CPT | Mod: CV19,,, | Performed by: FAMILY MEDICINE

## 2021-04-28 ENCOUNTER — PATIENT OUTREACH (OUTPATIENT)
Dept: ADMINISTRATIVE | Facility: OTHER | Age: 56
End: 2021-04-28

## 2021-04-29 ENCOUNTER — PATIENT MESSAGE (OUTPATIENT)
Dept: ADMINISTRATIVE | Facility: HOSPITAL | Age: 56
End: 2021-04-29

## 2021-04-29 ENCOUNTER — PATIENT OUTREACH (OUTPATIENT)
Dept: ADMINISTRATIVE | Facility: HOSPITAL | Age: 56
End: 2021-04-29

## 2021-04-30 ENCOUNTER — LAB VISIT (OUTPATIENT)
Dept: LAB | Facility: HOSPITAL | Age: 56
End: 2021-04-30
Attending: INTERNAL MEDICINE
Payer: COMMERCIAL

## 2021-04-30 DIAGNOSIS — Z11.59 NEED FOR HEPATITIS C SCREENING TEST: ICD-10-CM

## 2021-04-30 DIAGNOSIS — Z00.00 ROUTINE MEDICAL EXAM: ICD-10-CM

## 2021-04-30 LAB
ALBUMIN SERPL BCP-MCNC: 4 G/DL (ref 3.5–5.2)
ALP SERPL-CCNC: 51 U/L (ref 55–135)
ALT SERPL W/O P-5'-P-CCNC: 24 U/L (ref 10–44)
ANION GAP SERPL CALC-SCNC: 13 MMOL/L (ref 8–16)
AST SERPL-CCNC: 20 U/L (ref 10–40)
BASOPHILS # BLD AUTO: 0.04 K/UL (ref 0–0.2)
BASOPHILS NFR BLD: 0.7 % (ref 0–1.9)
BILIRUB SERPL-MCNC: 0.5 MG/DL (ref 0.1–1)
BUN SERPL-MCNC: 12 MG/DL (ref 6–20)
CALCIUM SERPL-MCNC: 9.8 MG/DL (ref 8.7–10.5)
CHLORIDE SERPL-SCNC: 102 MMOL/L (ref 95–110)
CHOLEST SERPL-MCNC: 155 MG/DL (ref 120–199)
CHOLEST/HDLC SERPL: 3.9 {RATIO} (ref 2–5)
CO2 SERPL-SCNC: 23 MMOL/L (ref 23–29)
COMPLEXED PSA SERPL-MCNC: 1.9 NG/ML (ref 0–4)
CREAT SERPL-MCNC: 1 MG/DL (ref 0.5–1.4)
DIFFERENTIAL METHOD: NORMAL
EOSINOPHIL # BLD AUTO: 0.1 K/UL (ref 0–0.5)
EOSINOPHIL NFR BLD: 2.2 % (ref 0–8)
ERYTHROCYTE [DISTWIDTH] IN BLOOD BY AUTOMATED COUNT: 12.5 % (ref 11.5–14.5)
EST. GFR  (AFRICAN AMERICAN): >60 ML/MIN/1.73 M^2
EST. GFR  (NON AFRICAN AMERICAN): >60 ML/MIN/1.73 M^2
ESTIMATED AVG GLUCOSE: 123 MG/DL (ref 68–131)
GLUCOSE SERPL-MCNC: 144 MG/DL (ref 70–110)
HBA1C MFR BLD: 5.9 % (ref 4–5.6)
HCT VFR BLD AUTO: 45.1 % (ref 40–54)
HDLC SERPL-MCNC: 40 MG/DL (ref 40–75)
HDLC SERPL: 25.8 % (ref 20–50)
HGB BLD-MCNC: 15.4 G/DL (ref 14–18)
IMM GRANULOCYTES # BLD AUTO: 0.03 K/UL (ref 0–0.04)
IMM GRANULOCYTES NFR BLD AUTO: 0.5 % (ref 0–0.5)
LDLC SERPL CALC-MCNC: 90.2 MG/DL (ref 63–159)
LYMPHOCYTES # BLD AUTO: 1.5 K/UL (ref 1–4.8)
LYMPHOCYTES NFR BLD: 24.9 % (ref 18–48)
MCH RBC QN AUTO: 30.2 PG (ref 27–31)
MCHC RBC AUTO-ENTMCNC: 34.1 G/DL (ref 32–36)
MCV RBC AUTO: 88 FL (ref 82–98)
MONOCYTES # BLD AUTO: 0.6 K/UL (ref 0.3–1)
MONOCYTES NFR BLD: 9.6 % (ref 4–15)
NEUTROPHILS # BLD AUTO: 3.6 K/UL (ref 1.8–7.7)
NEUTROPHILS NFR BLD: 62.1 % (ref 38–73)
NONHDLC SERPL-MCNC: 115 MG/DL
NRBC BLD-RTO: 0 /100 WBC
PLATELET # BLD AUTO: 201 K/UL (ref 150–450)
PMV BLD AUTO: 10.4 FL (ref 9.2–12.9)
POTASSIUM SERPL-SCNC: 3.9 MMOL/L (ref 3.5–5.1)
PROT SERPL-MCNC: 7.4 G/DL (ref 6–8.4)
RBC # BLD AUTO: 5.1 M/UL (ref 4.6–6.2)
SODIUM SERPL-SCNC: 138 MMOL/L (ref 136–145)
TRIGL SERPL-MCNC: 124 MG/DL (ref 30–150)
TSH SERPL DL<=0.005 MIU/L-ACNC: 1.84 UIU/ML (ref 0.4–4)
WBC # BLD AUTO: 5.82 K/UL (ref 3.9–12.7)

## 2021-04-30 PROCEDURE — 80061 LIPID PANEL: CPT | Performed by: INTERNAL MEDICINE

## 2021-04-30 PROCEDURE — 84443 ASSAY THYROID STIM HORMONE: CPT | Performed by: INTERNAL MEDICINE

## 2021-04-30 PROCEDURE — 83036 HEMOGLOBIN GLYCOSYLATED A1C: CPT | Performed by: INTERNAL MEDICINE

## 2021-04-30 PROCEDURE — 85025 COMPLETE CBC W/AUTO DIFF WBC: CPT | Performed by: INTERNAL MEDICINE

## 2021-04-30 PROCEDURE — 80053 COMPREHEN METABOLIC PANEL: CPT | Performed by: INTERNAL MEDICINE

## 2021-04-30 PROCEDURE — 86803 HEPATITIS C AB TEST: CPT | Performed by: INTERNAL MEDICINE

## 2021-04-30 PROCEDURE — 36415 COLL VENOUS BLD VENIPUNCTURE: CPT | Mod: PO | Performed by: INTERNAL MEDICINE

## 2021-04-30 PROCEDURE — 84153 ASSAY OF PSA TOTAL: CPT | Performed by: INTERNAL MEDICINE

## 2021-05-03 ENCOUNTER — PATIENT MESSAGE (OUTPATIENT)
Dept: FAMILY MEDICINE | Facility: CLINIC | Age: 56
End: 2021-05-03

## 2021-05-03 LAB — HCV AB SERPL QL IA: NEGATIVE

## 2021-05-05 ENCOUNTER — OFFICE VISIT (OUTPATIENT)
Dept: ENDOCRINOLOGY | Facility: CLINIC | Age: 56
End: 2021-05-05
Payer: COMMERCIAL

## 2021-05-05 VITALS
DIASTOLIC BLOOD PRESSURE: 80 MMHG | WEIGHT: 243.69 LBS | BODY MASS INDEX: 28.9 KG/M2 | HEART RATE: 88 BPM | SYSTOLIC BLOOD PRESSURE: 118 MMHG | TEMPERATURE: 99 F

## 2021-05-05 DIAGNOSIS — E11.40 TYPE 2 DIABETES, CONTROLLED, WITH NEUROPATHY: Primary | ICD-10-CM

## 2021-05-05 DIAGNOSIS — E78.5 HYPERLIPIDEMIA, UNSPECIFIED HYPERLIPIDEMIA TYPE: ICD-10-CM

## 2021-05-05 PROCEDURE — 99999 PR PBB SHADOW E&M-EST. PATIENT-LVL III: ICD-10-PCS | Mod: PBBFAC,,, | Performed by: NURSE PRACTITIONER

## 2021-05-05 PROCEDURE — 99999 PR PBB SHADOW E&M-EST. PATIENT-LVL III: CPT | Mod: PBBFAC,,, | Performed by: NURSE PRACTITIONER

## 2021-05-05 PROCEDURE — 99214 OFFICE O/P EST MOD 30 MIN: CPT | Mod: S$GLB,,, | Performed by: NURSE PRACTITIONER

## 2021-05-05 PROCEDURE — 99214 PR OFFICE/OUTPT VISIT, EST, LEVL IV, 30-39 MIN: ICD-10-PCS | Mod: S$GLB,,, | Performed by: NURSE PRACTITIONER

## 2021-05-05 RX ORDER — GLIMEPIRIDE 2 MG/1
2 TABLET ORAL
Qty: 90 TABLET | Refills: 2 | Status: SHIPPED | OUTPATIENT
Start: 2021-05-05 | End: 2023-04-11 | Stop reason: SDUPTHER

## 2021-05-05 RX ORDER — INSULIN GLARGINE 300 U/ML
INJECTION, SOLUTION SUBCUTANEOUS
Qty: 6 SYRINGE | Refills: 2 | Status: SHIPPED | OUTPATIENT
Start: 2021-05-05 | End: 2023-04-11 | Stop reason: SDUPTHER

## 2021-05-05 RX ORDER — METFORMIN HYDROCHLORIDE 1000 MG/1
TABLET ORAL
Qty: 180 TABLET | Refills: 2 | Status: SHIPPED | OUTPATIENT
Start: 2021-05-05 | End: 2022-06-03 | Stop reason: SDUPTHER

## 2021-05-05 RX ORDER — DULAGLUTIDE 1.5 MG/.5ML
1.5 INJECTION, SOLUTION SUBCUTANEOUS
Qty: 4 PEN | Refills: 5 | Status: SHIPPED | OUTPATIENT
Start: 2021-05-05 | End: 2023-04-11 | Stop reason: SDUPTHER

## 2021-05-10 ENCOUNTER — HOSPITAL ENCOUNTER (OUTPATIENT)
Dept: CARDIOLOGY | Facility: HOSPITAL | Age: 56
Discharge: HOME | End: 2021-05-10
Attending: SURGERY
Payer: COMMERCIAL

## 2021-05-10 ENCOUNTER — OFFICE VISIT (OUTPATIENT)
Dept: VASCULAR SURGERY | Facility: CLINIC | Age: 56
End: 2021-05-10
Payer: COMMERCIAL

## 2021-05-10 VITALS
DIASTOLIC BLOOD PRESSURE: 78 MMHG | BODY MASS INDEX: 28.89 KG/M2 | WEIGHT: 244.69 LBS | SYSTOLIC BLOOD PRESSURE: 118 MMHG | HEIGHT: 77 IN

## 2021-05-10 DIAGNOSIS — I82.811 VENOUS EMBOLISM AND THROMBOSIS OF SUPERFICIAL VESSELS OF RIGHT LOWER EXTREMITY: ICD-10-CM

## 2021-05-10 DIAGNOSIS — I87.2 VENOUS INSUFFICIENCY OF RIGHT LEG: ICD-10-CM

## 2021-05-10 DIAGNOSIS — I80.9 SUPERFICIAL THROMBOPHLEBITIS, INVOLVING UNSPECIFIED SITE: ICD-10-CM

## 2021-05-10 DIAGNOSIS — I83.11 VARICOSE VEINS OF RIGHT LOWER EXTREMITY WITH INFLAMMATION: ICD-10-CM

## 2021-05-10 DIAGNOSIS — I83.11 VARICOSE VEINS OF RIGHT LOWER EXTREMITY WITH INFLAMMATION: Primary | ICD-10-CM

## 2021-05-10 LAB
LEFT GREAT SAPHENOUS DISTAL THIGH DIA: 0.4 CM
LEFT GREAT SAPHENOUS DISTAL THIGH REFLUX: 628 MS
LEFT GREAT SAPHENOUS JUNCTION DIA: 0.8 CM
LEFT GREAT SAPHENOUS KNEE DIA: 0.3 CM
LEFT GREAT SAPHENOUS KNEE REFLUX: 1233 MS
LEFT GREAT SAPHENOUS MIDDLE THIGH DIA: 0.3 CM
LEFT GREAT SAPHENOUS MIDDLE THIGH REFLUX: 2637 MS
LEFT GREAT SAPHENOUS PROXIMAL CALF DIA: 0.4 CM
LEFT GREAT SAPHENOUS PROXIMAL CALF REFLUX: 548 MS
LEFT SMALL SAPHENOUS KNEE DIA: 0.3 CM
LEFT SMALL SAPHENOUS SPJ DIA: 0.2 CM
RIGHT GREAT SAPHENOUS DISTAL THIGH DIA: 0.6 CM
RIGHT GREAT SAPHENOUS JUNCTION DIA: 0.7 CM
RIGHT GREAT SAPHENOUS JUNCTION REFLUX: 2586 MS
RIGHT GREAT SAPHENOUS KNEE DIA: 0.7 CM
RIGHT GREAT SAPHENOUS MIDDLE THIGH DIA: 0.9 CM
RIGHT GREAT SAPHENOUS PROXIMAL CALF DIA: 0.6 CM
RIGHT GREAT SAPHENOUS PROXIMAL CALF REFLUX: 3745 MS
RIGHT SMALL SAPHENOUS KNEE DIA: 0.2 CM
RIGHT SMALL SAPHENOUS SPJ DIA: 0.3 CM

## 2021-05-10 PROCEDURE — 99213 OFFICE O/P EST LOW 20 MIN: CPT | Mod: S$GLB,,, | Performed by: SURGERY

## 2021-05-10 PROCEDURE — 93970 EXTREMITY STUDY: CPT | Mod: TC

## 2021-05-10 PROCEDURE — 93970 EXTREMITY STUDY: CPT | Mod: 26,,, | Performed by: SURGERY

## 2021-05-10 PROCEDURE — 93970 CV US LOWER VENOUS INSUFFICIENCY BILATERAL (CUPID ONLY): ICD-10-PCS | Mod: 26,,, | Performed by: SURGERY

## 2021-05-10 PROCEDURE — 99999 PR PBB SHADOW E&M-EST. PATIENT-LVL IV: ICD-10-PCS | Mod: PBBFAC,,, | Performed by: SURGERY

## 2021-05-10 PROCEDURE — 99999 PR PBB SHADOW E&M-EST. PATIENT-LVL IV: CPT | Mod: PBBFAC,,, | Performed by: SURGERY

## 2021-05-10 PROCEDURE — 99213 PR OFFICE/OUTPT VISIT, EST, LEVL III, 20-29 MIN: ICD-10-PCS | Mod: S$GLB,,, | Performed by: SURGERY

## 2021-05-11 DIAGNOSIS — I83.891 SYMPTOMATIC VARICOSE VEINS OF RIGHT LOWER EXTREMITY: Primary | ICD-10-CM

## 2021-06-21 ENCOUNTER — PATIENT MESSAGE (OUTPATIENT)
Dept: VASCULAR SURGERY | Facility: CLINIC | Age: 56
End: 2021-06-21

## 2021-06-21 ENCOUNTER — PATIENT MESSAGE (OUTPATIENT)
Dept: FAMILY MEDICINE | Facility: CLINIC | Age: 56
End: 2021-06-21

## 2021-06-22 ENCOUNTER — PATIENT MESSAGE (OUTPATIENT)
Dept: SURGERY | Facility: CLINIC | Age: 56
End: 2021-06-22

## 2021-06-22 RX ORDER — SILDENAFIL CITRATE 20 MG/1
TABLET ORAL
Qty: 30 TABLET | Refills: 11 | OUTPATIENT
Start: 2021-06-22 | End: 2021-06-25 | Stop reason: SDUPTHER

## 2021-06-25 ENCOUNTER — TELEPHONE (OUTPATIENT)
Dept: VASCULAR SURGERY | Facility: CLINIC | Age: 56
End: 2021-06-25

## 2021-06-25 RX ORDER — SILDENAFIL CITRATE 20 MG/1
TABLET ORAL
Qty: 30 TABLET | Refills: 11 | OUTPATIENT
Start: 2021-06-25

## 2021-06-25 RX ORDER — SILDENAFIL CITRATE 20 MG/1
TABLET ORAL
Qty: 30 TABLET | Refills: 11 | Status: SHIPPED | OUTPATIENT
Start: 2021-06-25 | End: 2022-05-23 | Stop reason: SDUPTHER

## 2021-06-28 ENCOUNTER — PATIENT MESSAGE (OUTPATIENT)
Dept: FAMILY MEDICINE | Facility: CLINIC | Age: 56
End: 2021-06-28

## 2021-06-29 DIAGNOSIS — Z91.89 AT HIGH RISK FOR PAIN FROM PROCEDURE: Primary | ICD-10-CM

## 2021-06-29 DIAGNOSIS — F41.9 ANXIETY DUE TO INVASIVE PROCEDURE: ICD-10-CM

## 2021-06-29 RX ORDER — ALPRAZOLAM 0.5 MG/1
TABLET ORAL
Qty: 2 TABLET | Refills: 0 | Status: ON HOLD | OUTPATIENT
Start: 2021-06-29 | End: 2021-10-10 | Stop reason: HOSPADM

## 2021-06-29 RX ORDER — MELOXICAM 7.5 MG/1
7.5 TABLET ORAL 2 TIMES DAILY
Qty: 14 TABLET | Refills: 0 | Status: SHIPPED | OUTPATIENT
Start: 2021-06-29 | End: 2021-07-06

## 2021-07-02 ENCOUNTER — PROCEDURE VISIT (OUTPATIENT)
Dept: VASCULAR SURGERY | Facility: CLINIC | Age: 56
End: 2021-07-02
Attending: SURGERY
Payer: COMMERCIAL

## 2021-07-02 VITALS — TEMPERATURE: 97 F | DIASTOLIC BLOOD PRESSURE: 88 MMHG | SYSTOLIC BLOOD PRESSURE: 132 MMHG | HEART RATE: 95 BPM

## 2021-07-02 DIAGNOSIS — I83.11 VARICOSE VEINS OF RIGHT LOWER EXTREMITY WITH INFLAMMATION: ICD-10-CM

## 2021-07-02 DIAGNOSIS — I83.891 SYMPTOMATIC VARICOSE VEINS OF RIGHT LOWER EXTREMITY: ICD-10-CM

## 2021-07-02 DIAGNOSIS — Z91.89 AT HIGH RISK FOR PAIN FROM PROCEDURE: Primary | ICD-10-CM

## 2021-07-02 PROCEDURE — 37765 STAB PHLEB VEINS XTR 10-20: CPT | Mod: RT,S$GLB,, | Performed by: SURGERY

## 2021-07-02 PROCEDURE — 37765 PR PHLEB VEINS - EXTREM - TO 20: ICD-10-PCS | Mod: RT,S$GLB,, | Performed by: SURGERY

## 2021-07-02 RX ORDER — LIDOCAINE HYDROCHLORIDE 10 MG/ML
1 INJECTION INFILTRATION; PERINEURAL
Status: DISCONTINUED | OUTPATIENT
Start: 2021-07-02 | End: 2021-10-10 | Stop reason: HOSPADM

## 2021-07-06 ENCOUNTER — PATIENT MESSAGE (OUTPATIENT)
Dept: ENDOCRINOLOGY | Facility: CLINIC | Age: 56
End: 2021-07-06

## 2021-07-16 ENCOUNTER — PATIENT OUTREACH (OUTPATIENT)
Dept: ADMINISTRATIVE | Facility: OTHER | Age: 56
End: 2021-07-16

## 2021-07-21 ENCOUNTER — OFFICE VISIT (OUTPATIENT)
Dept: PODIATRY | Facility: CLINIC | Age: 56
End: 2021-07-21
Payer: COMMERCIAL

## 2021-07-21 VITALS — HEIGHT: 77 IN | WEIGHT: 244 LBS | BODY MASS INDEX: 28.81 KG/M2

## 2021-07-21 DIAGNOSIS — M20.5X2 HALLUX LIMITUS, ACQUIRED, LEFT: ICD-10-CM

## 2021-07-21 DIAGNOSIS — M20.42 HAMMER TOES OF BOTH FEET: ICD-10-CM

## 2021-07-21 DIAGNOSIS — M20.5X1 HALLUX LIMITUS, ACQUIRED, RIGHT: ICD-10-CM

## 2021-07-21 DIAGNOSIS — M20.41 HAMMER TOES OF BOTH FEET: ICD-10-CM

## 2021-07-21 DIAGNOSIS — B35.1 ONYCHOMYCOSIS DUE TO DERMATOPHYTE: ICD-10-CM

## 2021-07-21 DIAGNOSIS — Z86.31 HEALED DIABETIC FOOT ULCER: ICD-10-CM

## 2021-07-21 PROCEDURE — 99999 PR PBB SHADOW E&M-EST. PATIENT-LVL IV: CPT | Mod: PBBFAC,,, | Performed by: PODIATRIST

## 2021-07-21 PROCEDURE — 99499 UNLISTED E&M SERVICE: CPT | Mod: S$GLB,,, | Performed by: PODIATRIST

## 2021-07-21 PROCEDURE — 11721 PR DEBRIDEMENT OF NAILS, 6 OR MORE: ICD-10-PCS | Mod: S$GLB,,, | Performed by: PODIATRIST

## 2021-07-21 PROCEDURE — 99999 PR PBB SHADOW E&M-EST. PATIENT-LVL IV: ICD-10-PCS | Mod: PBBFAC,,, | Performed by: PODIATRIST

## 2021-07-21 PROCEDURE — 11721 DEBRIDE NAIL 6 OR MORE: CPT | Mod: S$GLB,,, | Performed by: PODIATRIST

## 2021-07-21 PROCEDURE — 99499 NO LOS: ICD-10-PCS | Mod: S$GLB,,, | Performed by: PODIATRIST

## 2021-07-23 ENCOUNTER — PATIENT MESSAGE (OUTPATIENT)
Dept: ENDOCRINOLOGY | Facility: CLINIC | Age: 56
End: 2021-07-23

## 2021-07-29 ENCOUNTER — LAB VISIT (OUTPATIENT)
Dept: LAB | Facility: HOSPITAL | Age: 56
End: 2021-07-29
Attending: NURSE PRACTITIONER
Payer: COMMERCIAL

## 2021-07-29 DIAGNOSIS — E11.40 TYPE 2 DIABETES, CONTROLLED, WITH NEUROPATHY: ICD-10-CM

## 2021-07-29 DIAGNOSIS — E78.5 HYPERLIPIDEMIA, UNSPECIFIED HYPERLIPIDEMIA TYPE: ICD-10-CM

## 2021-07-29 LAB
CHOLEST SERPL-MCNC: 189 MG/DL (ref 120–199)
CHOLEST/HDLC SERPL: 4.6 {RATIO} (ref 2–5)
ESTIMATED AVG GLUCOSE: 131 MG/DL (ref 68–131)
HBA1C MFR BLD: 6.2 % (ref 4–5.6)
HDLC SERPL-MCNC: 41 MG/DL (ref 40–75)
HDLC SERPL: 21.7 % (ref 20–50)
LDLC SERPL CALC-MCNC: 126.4 MG/DL (ref 63–159)
NONHDLC SERPL-MCNC: 148 MG/DL
TRIGL SERPL-MCNC: 108 MG/DL (ref 30–150)

## 2021-07-29 PROCEDURE — 80061 LIPID PANEL: CPT | Performed by: NURSE PRACTITIONER

## 2021-07-29 PROCEDURE — 83036 HEMOGLOBIN GLYCOSYLATED A1C: CPT | Performed by: NURSE PRACTITIONER

## 2021-07-29 PROCEDURE — 36415 COLL VENOUS BLD VENIPUNCTURE: CPT | Mod: PO | Performed by: NURSE PRACTITIONER

## 2021-08-10 ENCOUNTER — PATIENT MESSAGE (OUTPATIENT)
Dept: VASCULAR SURGERY | Facility: CLINIC | Age: 56
End: 2021-08-10

## 2021-08-16 ENCOUNTER — OFFICE VISIT (OUTPATIENT)
Dept: VASCULAR SURGERY | Facility: CLINIC | Age: 56
End: 2021-08-16
Payer: COMMERCIAL

## 2021-08-16 VITALS
HEART RATE: 78 BPM | DIASTOLIC BLOOD PRESSURE: 74 MMHG | BODY MASS INDEX: 28.93 KG/M2 | WEIGHT: 244 LBS | SYSTOLIC BLOOD PRESSURE: 126 MMHG

## 2021-08-16 DIAGNOSIS — I87.2 VENOUS INSUFFICIENCY: Primary | ICD-10-CM

## 2021-08-16 PROCEDURE — 99214 PR OFFICE/OUTPT VISIT, EST, LEVL IV, 30-39 MIN: ICD-10-PCS | Mod: S$GLB,,, | Performed by: SURGERY

## 2021-08-16 PROCEDURE — 99214 OFFICE O/P EST MOD 30 MIN: CPT | Mod: S$GLB,,, | Performed by: SURGERY

## 2021-08-17 ENCOUNTER — PATIENT MESSAGE (OUTPATIENT)
Dept: VASCULAR SURGERY | Facility: CLINIC | Age: 56
End: 2021-08-17

## 2021-08-19 ENCOUNTER — TELEPHONE (OUTPATIENT)
Dept: VASCULAR SURGERY | Facility: CLINIC | Age: 56
End: 2021-08-19

## 2021-08-19 DIAGNOSIS — I87.2 VENOUS INSUFFICIENCY: Primary | ICD-10-CM

## 2021-09-04 ENCOUNTER — PATIENT MESSAGE (OUTPATIENT)
Dept: FAMILY MEDICINE | Facility: CLINIC | Age: 56
End: 2021-09-04

## 2021-09-11 ENCOUNTER — PATIENT MESSAGE (OUTPATIENT)
Dept: VASCULAR SURGERY | Facility: CLINIC | Age: 56
End: 2021-09-11

## 2021-09-20 ENCOUNTER — OFFICE VISIT (OUTPATIENT)
Dept: VASCULAR SURGERY | Facility: CLINIC | Age: 56
End: 2021-09-20
Payer: COMMERCIAL

## 2021-09-20 VITALS
BODY MASS INDEX: 29.7 KG/M2 | HEIGHT: 77 IN | WEIGHT: 251.56 LBS | DIASTOLIC BLOOD PRESSURE: 80 MMHG | SYSTOLIC BLOOD PRESSURE: 118 MMHG

## 2021-09-20 DIAGNOSIS — I87.2 VENOUS INSUFFICIENCY: Primary | ICD-10-CM

## 2021-09-20 PROCEDURE — 99999 PR PBB SHADOW E&M-EST. PATIENT-LVL IV: ICD-10-PCS | Mod: PBBFAC,,, | Performed by: SURGERY

## 2021-09-20 PROCEDURE — 99214 PR OFFICE/OUTPT VISIT, EST, LEVL IV, 30-39 MIN: ICD-10-PCS | Mod: S$GLB,,, | Performed by: SURGERY

## 2021-09-20 PROCEDURE — 99214 OFFICE O/P EST MOD 30 MIN: CPT | Mod: S$GLB,,, | Performed by: SURGERY

## 2021-09-20 PROCEDURE — 99999 PR PBB SHADOW E&M-EST. PATIENT-LVL IV: CPT | Mod: PBBFAC,,, | Performed by: SURGERY

## 2021-09-21 ENCOUNTER — TELEPHONE (OUTPATIENT)
Dept: VASCULAR SURGERY | Facility: CLINIC | Age: 56
End: 2021-09-21

## 2021-09-25 ENCOUNTER — PATIENT MESSAGE (OUTPATIENT)
Dept: VASCULAR SURGERY | Facility: CLINIC | Age: 56
End: 2021-09-25

## 2021-09-27 ENCOUNTER — PATIENT MESSAGE (OUTPATIENT)
Dept: SURGERY | Facility: CLINIC | Age: 56
End: 2021-09-27

## 2021-10-08 ENCOUNTER — HOSPITAL ENCOUNTER (INPATIENT)
Facility: HOSPITAL | Age: 56
LOS: 2 days | Discharge: HOME | DRG: 176 | End: 2021-10-10
Attending: EMERGENCY MEDICINE | Admitting: INTERNAL MEDICINE
Payer: COMMERCIAL

## 2021-10-08 DIAGNOSIS — I26.09 ACUTE PULMONARY EMBOLISM WITH ACUTE COR PULMONALE, UNSPECIFIED PULMONARY EMBOLISM TYPE: Primary | ICD-10-CM

## 2021-10-08 DIAGNOSIS — R06.02 SHORTNESS OF BREATH: ICD-10-CM

## 2021-10-08 DIAGNOSIS — I26.99 PE (PULMONARY THROMBOEMBOLISM): ICD-10-CM

## 2021-10-08 DIAGNOSIS — E11.40 TYPE 2 DIABETES MELLITUS WITH DIABETIC NEUROPATHY, WITHOUT LONG-TERM CURRENT USE OF INSULIN: ICD-10-CM

## 2021-10-08 DIAGNOSIS — E87.6 HYPOKALEMIA: ICD-10-CM

## 2021-10-08 LAB
ALBUMIN SERPL-MCNC: 3.8 G/DL (ref 3.3–5.5)
ALP SERPL-CCNC: 68 U/L (ref 42–141)
BILIRUB SERPL-MCNC: 0.7 MG/DL (ref 0.2–1.6)
BUN SERPL-MCNC: 12 MG/DL (ref 7–22)
CALCIUM SERPL-MCNC: 10.4 MG/DL (ref 8–10.3)
CHLORIDE SERPL-SCNC: 99 MMOL/L (ref 98–108)
CREAT SERPL-MCNC: 1.1 MG/DL (ref 0.6–1.2)
CTP QC/QA: YES
GLUCOSE SERPL-MCNC: 159 MG/DL (ref 73–118)
POC ALT (SGPT): 37 U/L (ref 10–47)
POC AST (SGOT): 35 U/L (ref 11–38)
POC B-TYPE NATRIURETIC PEPTIDE: <5 PG/ML (ref 0–100)
POC CARDIAC TROPONIN I: 0.03 NG/ML
POC D-DI: 3620 NG/ML (ref 0–450)
POC TCO2: 26 MMOL/L (ref 18–33)
POCT GLUCOSE: 145 MG/DL (ref 70–110)
POTASSIUM BLD-SCNC: 3.5 MMOL/L (ref 3.6–5.1)
PROTEIN, POC: 7.3 G/DL (ref 6.4–8.1)
SAMPLE: NORMAL
SARS-COV-2 RDRP RESP QL NAA+PROBE: NEGATIVE
SODIUM BLD-SCNC: 140 MMOL/L (ref 128–145)

## 2021-10-08 PROCEDURE — 99291 CRITICAL CARE FIRST HOUR: CPT | Mod: 25,ER

## 2021-10-08 PROCEDURE — 11000001 HC ACUTE MED/SURG PRIVATE ROOM

## 2021-10-08 PROCEDURE — 96372 THER/PROPH/DIAG INJ SC/IM: CPT | Mod: 59,ER

## 2021-10-08 PROCEDURE — 25500020 PHARM REV CODE 255: Mod: ER | Performed by: INTERNAL MEDICINE

## 2021-10-08 PROCEDURE — 80053 COMPREHEN METABOLIC PANEL: CPT | Mod: ER

## 2021-10-08 PROCEDURE — 93010 ELECTROCARDIOGRAM REPORT: CPT | Mod: ,,, | Performed by: INTERNAL MEDICINE

## 2021-10-08 PROCEDURE — 25000003 PHARM REV CODE 250: Mod: ER | Performed by: INTERNAL MEDICINE

## 2021-10-08 PROCEDURE — 85379 FIBRIN DEGRADATION QUANT: CPT | Mod: ER

## 2021-10-08 PROCEDURE — 96361 HYDRATE IV INFUSION ADD-ON: CPT | Mod: ER

## 2021-10-08 PROCEDURE — 93005 ELECTROCARDIOGRAM TRACING: CPT | Mod: ER

## 2021-10-08 PROCEDURE — 96360 HYDRATION IV INFUSION INIT: CPT | Mod: ER

## 2021-10-08 PROCEDURE — 25000003 PHARM REV CODE 250: Mod: ER | Performed by: EMERGENCY MEDICINE

## 2021-10-08 PROCEDURE — 93010 EKG 12-LEAD: ICD-10-PCS | Mod: ,,, | Performed by: INTERNAL MEDICINE

## 2021-10-08 PROCEDURE — 85025 COMPLETE CBC W/AUTO DIFF WBC: CPT | Mod: ER

## 2021-10-08 PROCEDURE — 84484 ASSAY OF TROPONIN QUANT: CPT | Mod: ER

## 2021-10-08 PROCEDURE — U0002 COVID-19 LAB TEST NON-CDC: HCPCS | Mod: ER | Performed by: EMERGENCY MEDICINE

## 2021-10-08 PROCEDURE — 83880 ASSAY OF NATRIURETIC PEPTIDE: CPT | Mod: ER

## 2021-10-08 PROCEDURE — 82962 GLUCOSE BLOOD TEST: CPT | Mod: ER

## 2021-10-08 PROCEDURE — 63600175 PHARM REV CODE 636 W HCPCS: Mod: ER | Performed by: INTERNAL MEDICINE

## 2021-10-08 RX ORDER — POTASSIUM CHLORIDE 20 MEQ/1
40 TABLET, EXTENDED RELEASE ORAL
Status: COMPLETED | OUTPATIENT
Start: 2021-10-08 | End: 2021-10-08

## 2021-10-08 RX ORDER — ENOXAPARIN SODIUM 100 MG/ML
1 INJECTION SUBCUTANEOUS
Status: COMPLETED | OUTPATIENT
Start: 2021-10-08 | End: 2021-10-08

## 2021-10-08 RX ADMIN — ENOXAPARIN SODIUM 120 MG: 100 INJECTION SUBCUTANEOUS at 08:10

## 2021-10-08 RX ADMIN — SODIUM CHLORIDE 1000 ML: 0.9 INJECTION, SOLUTION INTRAVENOUS at 06:10

## 2021-10-08 RX ADMIN — IOHEXOL 100 ML: 350 INJECTION, SOLUTION INTRAVENOUS at 08:10

## 2021-10-08 RX ADMIN — POTASSIUM CHLORIDE 40 MEQ: 1500 TABLET, EXTENDED RELEASE ORAL at 09:10

## 2021-10-09 PROBLEM — R06.02 SHORTNESS OF BREATH: Status: RESOLVED | Noted: 2021-10-08 | Resolved: 2021-10-09

## 2021-10-09 PROBLEM — E66.811 OBESITY (BMI 30.0-34.9): Status: ACTIVE | Noted: 2021-10-09

## 2021-10-09 PROBLEM — I10 PRIMARY HYPERTENSION: Status: ACTIVE | Noted: 2021-10-09

## 2021-10-09 PROBLEM — E66.9 OBESITY (BMI 30.0-34.9): Status: ACTIVE | Noted: 2021-10-09

## 2021-10-09 PROBLEM — I26.99 ACUTE PULMONARY EMBOLISM WITHOUT ACUTE COR PULMONALE: Status: ACTIVE | Noted: 2021-10-08

## 2021-10-09 LAB
ALBUMIN SERPL BCP-MCNC: 3.8 G/DL (ref 3.5–5.2)
ALP SERPL-CCNC: 69 U/L (ref 55–135)
ALT SERPL W/O P-5'-P-CCNC: 32 U/L (ref 10–44)
ANION GAP SERPL CALC-SCNC: 12 MMOL/L (ref 8–16)
AORTIC ROOT ANNULUS: 3.16 CM
AORTIC VALVE CUSP SEPERATION: 2.18 CM
ASCENDING AORTA: 2.71 CM
AST SERPL-CCNC: 25 U/L (ref 10–40)
AV INDEX (PROSTH): 0.93
AV MEAN GRADIENT: 5 MMHG
AV PEAK GRADIENT: 8 MMHG
AV VALVE AREA: 3.8 CM2
AV VELOCITY RATIO: 0.82
BASOPHILS # BLD AUTO: 0.05 K/UL (ref 0–0.2)
BASOPHILS NFR BLD: 0.7 % (ref 0–1.9)
BILIRUB SERPL-MCNC: 0.6 MG/DL (ref 0.1–1)
BNP SERPL-MCNC: <10 PG/ML (ref 0–99)
BSA FOR ECHO PROCEDURE: 2.49 M2
BUN SERPL-MCNC: 10 MG/DL (ref 6–20)
CALCIUM SERPL-MCNC: 10.1 MG/DL (ref 8.7–10.5)
CHLORIDE SERPL-SCNC: 105 MMOL/L (ref 95–110)
CO2 SERPL-SCNC: 21 MMOL/L (ref 23–29)
CREAT SERPL-MCNC: 1.2 MG/DL (ref 0.5–1.4)
CV ECHO LV RWT: 0.41 CM
DIFFERENTIAL METHOD: NORMAL
DOP CALC AO PEAK VEL: 1.42 M/S
DOP CALC AO VTI: 25.55 CM
DOP CALC LVOT AREA: 4.1 CM2
DOP CALC LVOT DIAMETER: 2.28 CM
DOP CALC LVOT PEAK VEL: 1.17 M/S
DOP CALC LVOT STROKE VOLUME: 97 CM3
DOP CALCLVOT PEAK VEL VTI: 23.77 CM
E WAVE DECELERATION TIME: 129.92 MSEC
E/A RATIO: 0.94
E/E' RATIO: 8.36 M/S
ECHO LV POSTERIOR WALL: 0.98 CM (ref 0.6–1.1)
EJECTION FRACTION: 55 %
EOSINOPHIL # BLD AUTO: 0.2 K/UL (ref 0–0.5)
EOSINOPHIL NFR BLD: 2.2 % (ref 0–8)
ERYTHROCYTE [DISTWIDTH] IN BLOOD BY AUTOMATED COUNT: 11.9 % (ref 11.5–14.5)
EST. GFR  (AFRICAN AMERICAN): >60 ML/MIN/1.73 M^2
EST. GFR  (NON AFRICAN AMERICAN): >60 ML/MIN/1.73 M^2
ESTIMATED AVG GLUCOSE: 148 MG/DL (ref 68–131)
FRACTIONAL SHORTENING: 32 % (ref 28–44)
GLUCOSE SERPL-MCNC: 121 MG/DL (ref 70–110)
HBA1C MFR BLD: 6.8 % (ref 4–5.6)
HCT VFR BLD AUTO: 47.7 % (ref 40–54)
HGB BLD-MCNC: 16.6 G/DL (ref 14–18)
IMM GRANULOCYTES # BLD AUTO: 0.02 K/UL (ref 0–0.04)
IMM GRANULOCYTES NFR BLD AUTO: 0.3 % (ref 0–0.5)
INTERVENTRICULAR SEPTUM: 0.96 CM (ref 0.6–1.1)
IVRT: 138.41 MSEC
LA MAJOR: 4.88 CM
LA MINOR: 4.71 CM
LA WIDTH: 3.8 CM
LEFT ATRIUM SIZE: 3.22 CM
LEFT ATRIUM VOLUME INDEX: 20.3 ML/M2
LEFT ATRIUM VOLUME: 49.86 CM3
LEFT INTERNAL DIMENSION IN SYSTOLE: 3.28 CM (ref 2.1–4)
LEFT VENTRICLE DIASTOLIC VOLUME INDEX: 44.59 ML/M2
LEFT VENTRICLE DIASTOLIC VOLUME: 109.24 ML
LEFT VENTRICLE MASS INDEX: 67 G/M2
LEFT VENTRICLE SYSTOLIC VOLUME INDEX: 17.7 ML/M2
LEFT VENTRICLE SYSTOLIC VOLUME: 43.36 ML
LEFT VENTRICULAR INTERNAL DIMENSION IN DIASTOLE: 4.83 CM (ref 3.5–6)
LEFT VENTRICULAR MASS: 165.01 G
LV LATERAL E/E' RATIO: 7.08 M/S
LV SEPTAL E/E' RATIO: 10.22 M/S
LYMPHOCYTES # BLD AUTO: 2.1 K/UL (ref 1–4.8)
LYMPHOCYTES NFR BLD: 28.9 % (ref 18–48)
MAGNESIUM SERPL-MCNC: 1.9 MG/DL (ref 1.6–2.6)
MCH RBC QN AUTO: 30.7 PG (ref 27–31)
MCHC RBC AUTO-ENTMCNC: 34.8 G/DL (ref 32–36)
MCV RBC AUTO: 88 FL (ref 82–98)
MONOCYTES # BLD AUTO: 0.7 K/UL (ref 0.3–1)
MONOCYTES NFR BLD: 8.8 % (ref 4–15)
MV PEAK A VEL: 0.98 M/S
MV PEAK E VEL: 0.92 M/S
MV STENOSIS PRESSURE HALF TIME: 37.68 MS
MV VALVE AREA P 1/2 METHOD: 5.84 CM2
NEUTROPHILS # BLD AUTO: 4.4 K/UL (ref 1.8–7.7)
NEUTROPHILS NFR BLD: 59.1 % (ref 38–73)
NRBC BLD-RTO: 0 /100 WBC
PISA TR MAX VEL: 1.55 M/S
PLATELET # BLD AUTO: 223 K/UL (ref 150–450)
PMV BLD AUTO: 9.6 FL (ref 9.2–12.9)
POCT GLUCOSE: 129 MG/DL (ref 70–110)
POCT GLUCOSE: 153 MG/DL (ref 70–110)
POCT GLUCOSE: 213 MG/DL (ref 70–110)
POTASSIUM SERPL-SCNC: 3.9 MMOL/L (ref 3.5–5.1)
PROT SERPL-MCNC: 7.6 G/DL (ref 6–8.4)
PV PEAK VELOCITY: 0.91 CM/S
RA MAJOR: 3.86 CM
RA PRESSURE: 8 MMHG
RA WIDTH: 3.75 CM
RBC # BLD AUTO: 5.4 M/UL (ref 4.6–6.2)
RIGHT VENTRICULAR END-DIASTOLIC DIMENSION: 4.2 CM
RV TISSUE DOPPLER FREE WALL SYSTOLIC VELOCITY 1 (APICAL 4 CHAMBER VIEW): 10.69 CM/S
SINUS: 3.32 CM
SODIUM SERPL-SCNC: 138 MMOL/L (ref 136–145)
STJ: 2.91 CM
TDI LATERAL: 0.13 M/S
TDI SEPTAL: 0.09 M/S
TDI: 0.11 M/S
TR MAX PG: 10 MMHG
TRICUSPID ANNULAR PLANE SYSTOLIC EXCURSION: 2.13 CM
TROPONIN I SERPL DL<=0.01 NG/ML-MCNC: 0.12 NG/ML (ref 0–0.03)
TV REST PULMONARY ARTERY PRESSURE: 18 MMHG
WBC # BLD AUTO: 7.41 K/UL (ref 3.9–12.7)

## 2021-10-09 PROCEDURE — 85025 COMPLETE CBC W/AUTO DIFF WBC: CPT | Performed by: INTERNAL MEDICINE

## 2021-10-09 PROCEDURE — 25000003 PHARM REV CODE 250: Performed by: HOSPITALIST

## 2021-10-09 PROCEDURE — 63600175 PHARM REV CODE 636 W HCPCS: Performed by: INTERNAL MEDICINE

## 2021-10-09 PROCEDURE — 83036 HEMOGLOBIN GLYCOSYLATED A1C: CPT | Performed by: INTERNAL MEDICINE

## 2021-10-09 PROCEDURE — 36415 COLL VENOUS BLD VENIPUNCTURE: CPT | Performed by: INTERNAL MEDICINE

## 2021-10-09 PROCEDURE — 83735 ASSAY OF MAGNESIUM: CPT | Performed by: INTERNAL MEDICINE

## 2021-10-09 PROCEDURE — 80053 COMPREHEN METABOLIC PANEL: CPT | Performed by: INTERNAL MEDICINE

## 2021-10-09 PROCEDURE — 84484 ASSAY OF TROPONIN QUANT: CPT | Performed by: INTERNAL MEDICINE

## 2021-10-09 PROCEDURE — 11000001 HC ACUTE MED/SURG PRIVATE ROOM

## 2021-10-09 PROCEDURE — 25000003 PHARM REV CODE 250: Performed by: INTERNAL MEDICINE

## 2021-10-09 PROCEDURE — 83880 ASSAY OF NATRIURETIC PEPTIDE: CPT | Performed by: INTERNAL MEDICINE

## 2021-10-09 RX ORDER — SODIUM CHLORIDE 0.9 % (FLUSH) 0.9 %
10 SYRINGE (ML) INJECTION EVERY 12 HOURS PRN
Status: DISCONTINUED | OUTPATIENT
Start: 2021-10-09 | End: 2021-10-10 | Stop reason: HOSPADM

## 2021-10-09 RX ORDER — TALC
6 POWDER (GRAM) TOPICAL NIGHTLY PRN
Status: DISCONTINUED | OUTPATIENT
Start: 2021-10-09 | End: 2021-10-10 | Stop reason: HOSPADM

## 2021-10-09 RX ORDER — ACETAMINOPHEN 325 MG/1
650 TABLET ORAL EVERY 4 HOURS PRN
Status: DISCONTINUED | OUTPATIENT
Start: 2021-10-09 | End: 2021-10-10 | Stop reason: HOSPADM

## 2021-10-09 RX ORDER — LANOLIN ALCOHOL/MO/W.PET/CERES
800 CREAM (GRAM) TOPICAL
Status: DISCONTINUED | OUTPATIENT
Start: 2021-10-09 | End: 2021-10-10 | Stop reason: HOSPADM

## 2021-10-09 RX ORDER — IBUPROFEN 200 MG
16 TABLET ORAL
Status: DISCONTINUED | OUTPATIENT
Start: 2021-10-09 | End: 2021-10-10 | Stop reason: HOSPADM

## 2021-10-09 RX ORDER — ONDANSETRON 8 MG/1
8 TABLET, ORALLY DISINTEGRATING ORAL EVERY 8 HOURS PRN
Status: DISCONTINUED | OUTPATIENT
Start: 2021-10-09 | End: 2021-10-10 | Stop reason: HOSPADM

## 2021-10-09 RX ORDER — GLUCAGON 1 MG
1 KIT INJECTION
Status: DISCONTINUED | OUTPATIENT
Start: 2021-10-09 | End: 2021-10-10 | Stop reason: HOSPADM

## 2021-10-09 RX ORDER — INSULIN ASPART 100 [IU]/ML
0-5 INJECTION, SOLUTION INTRAVENOUS; SUBCUTANEOUS
Status: DISCONTINUED | OUTPATIENT
Start: 2021-10-09 | End: 2021-10-10 | Stop reason: HOSPADM

## 2021-10-09 RX ORDER — IBUPROFEN 200 MG
24 TABLET ORAL
Status: DISCONTINUED | OUTPATIENT
Start: 2021-10-09 | End: 2021-10-10 | Stop reason: HOSPADM

## 2021-10-09 RX ORDER — NALOXONE HCL 0.4 MG/ML
0.02 VIAL (ML) INJECTION
Status: DISCONTINUED | OUTPATIENT
Start: 2021-10-09 | End: 2021-10-10 | Stop reason: HOSPADM

## 2021-10-09 RX ORDER — SODIUM CHLORIDE 9 MG/ML
INJECTION, SOLUTION INTRAVENOUS CONTINUOUS
Status: ACTIVE | OUTPATIENT
Start: 2021-10-09 | End: 2021-10-09

## 2021-10-09 RX ORDER — DULOXETIN HYDROCHLORIDE 30 MG/1
60 CAPSULE, DELAYED RELEASE ORAL DAILY
Status: DISCONTINUED | OUTPATIENT
Start: 2021-10-09 | End: 2021-10-10 | Stop reason: HOSPADM

## 2021-10-09 RX ORDER — ENOXAPARIN SODIUM 150 MG/ML
1 INJECTION SUBCUTANEOUS
Status: DISCONTINUED | OUTPATIENT
Start: 2021-10-09 | End: 2021-10-09

## 2021-10-09 RX ADMIN — APIXABAN 10 MG: 5 TABLET, FILM COATED ORAL at 09:10

## 2021-10-09 RX ADMIN — ENOXAPARIN SODIUM 120 MG: 120 INJECTION SUBCUTANEOUS at 08:10

## 2021-10-09 RX ADMIN — SODIUM CHLORIDE: 0.9 INJECTION, SOLUTION INTRAVENOUS at 01:10

## 2021-10-10 VITALS
WEIGHT: 254.19 LBS | DIASTOLIC BLOOD PRESSURE: 102 MMHG | BODY MASS INDEX: 30.95 KG/M2 | TEMPERATURE: 98 F | HEIGHT: 76 IN | HEART RATE: 88 BPM | OXYGEN SATURATION: 98 % | SYSTOLIC BLOOD PRESSURE: 143 MMHG | RESPIRATION RATE: 20 BRPM

## 2021-10-10 LAB
ANION GAP SERPL CALC-SCNC: 9 MMOL/L (ref 8–16)
BASOPHILS # BLD AUTO: 0.03 K/UL (ref 0–0.2)
BASOPHILS NFR BLD: 0.7 % (ref 0–1.9)
BUN SERPL-MCNC: 8 MG/DL (ref 6–20)
CALCIUM SERPL-MCNC: 9.2 MG/DL (ref 8.7–10.5)
CHLORIDE SERPL-SCNC: 107 MMOL/L (ref 95–110)
CO2 SERPL-SCNC: 23 MMOL/L (ref 23–29)
CREAT SERPL-MCNC: 1.1 MG/DL (ref 0.5–1.4)
DIFFERENTIAL METHOD: NORMAL
EOSINOPHIL # BLD AUTO: 0.2 K/UL (ref 0–0.5)
EOSINOPHIL NFR BLD: 4.4 % (ref 0–8)
ERYTHROCYTE [DISTWIDTH] IN BLOOD BY AUTOMATED COUNT: 11.8 % (ref 11.5–14.5)
EST. GFR  (AFRICAN AMERICAN): >60 ML/MIN/1.73 M^2
EST. GFR  (NON AFRICAN AMERICAN): >60 ML/MIN/1.73 M^2
GLUCOSE SERPL-MCNC: 125 MG/DL (ref 70–110)
HCT VFR BLD AUTO: 42.7 % (ref 40–54)
HGB BLD-MCNC: 14.7 G/DL (ref 14–18)
IMM GRANULOCYTES # BLD AUTO: 0.01 K/UL (ref 0–0.04)
IMM GRANULOCYTES NFR BLD AUTO: 0.2 % (ref 0–0.5)
LYMPHOCYTES # BLD AUTO: 1.5 K/UL (ref 1–4.8)
LYMPHOCYTES NFR BLD: 35.7 % (ref 18–48)
MAGNESIUM SERPL-MCNC: 1.9 MG/DL (ref 1.6–2.6)
MCH RBC QN AUTO: 30.6 PG (ref 27–31)
MCHC RBC AUTO-ENTMCNC: 34.4 G/DL (ref 32–36)
MCV RBC AUTO: 89 FL (ref 82–98)
MONOCYTES # BLD AUTO: 0.5 K/UL (ref 0.3–1)
MONOCYTES NFR BLD: 13.1 % (ref 4–15)
NEUTROPHILS # BLD AUTO: 1.9 K/UL (ref 1.8–7.7)
NEUTROPHILS NFR BLD: 45.9 % (ref 38–73)
NRBC BLD-RTO: 0 /100 WBC
PLATELET # BLD AUTO: 182 K/UL (ref 150–450)
PMV BLD AUTO: 9.8 FL (ref 9.2–12.9)
POCT GLUCOSE: 134 MG/DL (ref 70–110)
POCT GLUCOSE: 188 MG/DL (ref 70–110)
POTASSIUM SERPL-SCNC: 4.4 MMOL/L (ref 3.5–5.1)
RBC # BLD AUTO: 4.81 M/UL (ref 4.6–6.2)
SODIUM SERPL-SCNC: 139 MMOL/L (ref 136–145)
WBC # BLD AUTO: 4.12 K/UL (ref 3.9–12.7)

## 2021-10-10 PROCEDURE — 83735 ASSAY OF MAGNESIUM: CPT | Performed by: INTERNAL MEDICINE

## 2021-10-10 PROCEDURE — 80048 BASIC METABOLIC PNL TOTAL CA: CPT | Performed by: INTERNAL MEDICINE

## 2021-10-10 PROCEDURE — 25000003 PHARM REV CODE 250: Performed by: HOSPITALIST

## 2021-10-10 PROCEDURE — 85025 COMPLETE CBC W/AUTO DIFF WBC: CPT | Performed by: INTERNAL MEDICINE

## 2021-10-10 PROCEDURE — 36415 COLL VENOUS BLD VENIPUNCTURE: CPT | Performed by: INTERNAL MEDICINE

## 2021-10-10 RX ORDER — HYDROCHLOROTHIAZIDE 12.5 MG/1
12.5 TABLET ORAL DAILY
Qty: 30 TABLET | Refills: 1 | Status: SHIPPED | OUTPATIENT
Start: 2021-10-10 | End: 2023-04-11

## 2021-10-10 RX ADMIN — APIXABAN 10 MG: 5 TABLET, FILM COATED ORAL at 08:10

## 2021-10-11 ENCOUNTER — TELEPHONE (OUTPATIENT)
Dept: FAMILY MEDICINE | Facility: CLINIC | Age: 56
End: 2021-10-11

## 2021-10-12 ENCOUNTER — TELEPHONE (OUTPATIENT)
Dept: VASCULAR SURGERY | Facility: CLINIC | Age: 56
End: 2021-10-12

## 2021-10-18 ENCOUNTER — OFFICE VISIT (OUTPATIENT)
Dept: FAMILY MEDICINE | Facility: CLINIC | Age: 56
End: 2021-10-18
Payer: COMMERCIAL

## 2021-10-18 VITALS
WEIGHT: 247.81 LBS | DIASTOLIC BLOOD PRESSURE: 88 MMHG | BODY MASS INDEX: 30.18 KG/M2 | HEIGHT: 76 IN | SYSTOLIC BLOOD PRESSURE: 122 MMHG | OXYGEN SATURATION: 97 % | HEART RATE: 100 BPM | TEMPERATURE: 98 F

## 2021-10-18 DIAGNOSIS — E11.42 DIABETIC POLYNEUROPATHY ASSOCIATED WITH TYPE 2 DIABETES MELLITUS: ICD-10-CM

## 2021-10-18 DIAGNOSIS — E11.65 UNCONTROLLED TYPE 2 DIABETES MELLITUS WITH HYPERGLYCEMIA: ICD-10-CM

## 2021-10-18 DIAGNOSIS — I83.11 VARICOSE VEINS OF RIGHT LOWER EXTREMITY WITH INFLAMMATION: ICD-10-CM

## 2021-10-18 DIAGNOSIS — I26.99 ACUTE PULMONARY EMBOLISM WITHOUT ACUTE COR PULMONALE, UNSPECIFIED PULMONARY EMBOLISM TYPE: Primary | ICD-10-CM

## 2021-10-18 DIAGNOSIS — Z91.199 POOR COMPLIANCE: ICD-10-CM

## 2021-10-18 DIAGNOSIS — I87.2 VENOUS INSUFFICIENCY: ICD-10-CM

## 2021-10-18 DIAGNOSIS — E78.5 HYPERLIPIDEMIA, UNSPECIFIED HYPERLIPIDEMIA TYPE: ICD-10-CM

## 2021-10-18 PROCEDURE — 99999 PR PBB SHADOW E&M-EST. PATIENT-LVL III: ICD-10-PCS | Mod: PBBFAC,,, | Performed by: INTERNAL MEDICINE

## 2021-10-18 PROCEDURE — 99214 OFFICE O/P EST MOD 30 MIN: CPT | Mod: S$GLB,,, | Performed by: INTERNAL MEDICINE

## 2021-10-18 PROCEDURE — 99214 PR OFFICE/OUTPT VISIT, EST, LEVL IV, 30-39 MIN: ICD-10-PCS | Mod: S$GLB,,, | Performed by: INTERNAL MEDICINE

## 2021-10-18 PROCEDURE — 99999 PR PBB SHADOW E&M-EST. PATIENT-LVL III: CPT | Mod: PBBFAC,,, | Performed by: INTERNAL MEDICINE

## 2021-10-18 RX ORDER — PRAVASTATIN SODIUM 40 MG/1
40 TABLET ORAL DAILY
Qty: 90 TABLET | Refills: 4 | Status: SHIPPED | OUTPATIENT
Start: 2021-10-18 | End: 2022-05-23 | Stop reason: SDUPTHER

## 2021-10-21 ENCOUNTER — PATIENT MESSAGE (OUTPATIENT)
Dept: FAMILY MEDICINE | Facility: CLINIC | Age: 56
End: 2021-10-21
Payer: COMMERCIAL

## 2021-10-21 ENCOUNTER — SSC ENCOUNTER (OUTPATIENT)
Dept: ADMINISTRATIVE | Facility: OTHER | Age: 56
End: 2021-10-21

## 2021-10-28 ENCOUNTER — TELEPHONE (OUTPATIENT)
Dept: VASCULAR SURGERY | Facility: CLINIC | Age: 56
End: 2021-10-28
Payer: COMMERCIAL

## 2021-10-29 ENCOUNTER — OUTPATIENT CASE MANAGEMENT (OUTPATIENT)
Dept: ADMINISTRATIVE | Facility: OTHER | Age: 56
End: 2021-10-29
Payer: COMMERCIAL

## 2021-11-19 ENCOUNTER — IMMUNIZATION (OUTPATIENT)
Dept: OBSTETRICS AND GYNECOLOGY | Facility: CLINIC | Age: 56
End: 2021-11-19
Payer: COMMERCIAL

## 2021-11-19 DIAGNOSIS — Z23 NEED FOR VACCINATION: Primary | ICD-10-CM

## 2021-11-19 PROCEDURE — 0004A COVID-19, MRNA, LNP-S, PF, 30 MCG/0.3 ML DOSE VACCINE: CPT | Mod: PBBFAC | Performed by: FAMILY MEDICINE

## 2021-12-27 ENCOUNTER — PATIENT OUTREACH (OUTPATIENT)
Dept: ADMINISTRATIVE | Facility: OTHER | Age: 56
End: 2021-12-27
Payer: COMMERCIAL

## 2022-02-18 DIAGNOSIS — E11.40 TYPE 2 DIABETES, CONTROLLED, WITH NEUROPATHY: Primary | ICD-10-CM

## 2022-02-22 RX ORDER — PEN NEEDLE, DIABETIC 30 GX3/16"
NEEDLE, DISPOSABLE MISCELLANEOUS
Qty: 100 EACH | Refills: 0 | Status: SHIPPED | OUTPATIENT
Start: 2022-02-22 | End: 2023-06-08 | Stop reason: SDUPTHER

## 2022-02-23 NOTE — TELEPHONE ENCOUNTER
Left message notifying patient that rx sent to pharmacy and that an apt with labs prior are needed for additional refills

## 2022-04-09 ENCOUNTER — PATIENT MESSAGE (OUTPATIENT)
Dept: ADMINISTRATIVE | Facility: HOSPITAL | Age: 57
End: 2022-04-09
Payer: COMMERCIAL

## 2022-05-20 DIAGNOSIS — E11.40 TYPE 2 DIABETES, CONTROLLED, WITH NEUROPATHY: ICD-10-CM

## 2022-05-20 RX ORDER — GLIMEPIRIDE 2 MG/1
TABLET ORAL
Qty: 90 TABLET | Refills: 0 | OUTPATIENT
Start: 2022-05-20

## 2022-05-20 RX ORDER — INSULIN GLARGINE 300 U/ML
INJECTION, SOLUTION SUBCUTANEOUS
Refills: 0 | OUTPATIENT
Start: 2022-05-20

## 2022-05-20 RX ORDER — METFORMIN HYDROCHLORIDE 1000 MG/1
TABLET ORAL
Qty: 180 TABLET | Refills: 0 | OUTPATIENT
Start: 2022-05-20

## 2022-05-20 NOTE — LETTER
May 20, 2022    Abel Morris  [address] [id number]             [facility]  [address] [facility] [address] [place]  Phone - [phone]  Fax - [phone] Dear Mr. Morris:    Your refill requests have been denied. You would need to have an office visit with labs prior for any additional refills.    Or you can follow up with your primary care provider for diabetes management.      If you have any questions or concerns, please don't hesitate to call.    Sincerely,        Kirstin Horta NP

## 2022-05-23 DIAGNOSIS — E78.5 HYPERLIPIDEMIA, UNSPECIFIED HYPERLIPIDEMIA TYPE: ICD-10-CM

## 2022-05-23 RX ORDER — LANCETS 33 GAUGE
EACH MISCELLANEOUS
COMMUNITY
Start: 2022-02-18

## 2022-05-23 RX ORDER — PRAVASTATIN SODIUM 40 MG/1
40 TABLET ORAL DAILY
Qty: 90 TABLET | Refills: 0 | Status: SHIPPED | OUTPATIENT
Start: 2022-05-23 | End: 2023-04-11 | Stop reason: SDUPTHER

## 2022-05-23 RX ORDER — SILDENAFIL CITRATE 20 MG/1
TABLET ORAL
Qty: 90 TABLET | Refills: 1 | Status: SHIPPED | OUTPATIENT
Start: 2022-05-23

## 2022-05-23 NOTE — TELEPHONE ENCOUNTER
No new care gaps identified.  [facility] Embedded Care Gaps. Reference number: [id number]. 5/23/2022   8:41:34 AM AVRILT

## 2022-05-23 NOTE — TELEPHONE ENCOUNTER
Refills have been requested for the following medications:         sildenafil (REVATIO) 20 mg Tab [Sae Starkey MD]         pravastatin (PRAVACHOL) 40 MG tablet [Sae Starkey MD]     Preferred pharmacy: [facility] [address] [place] [address]   Delivery method: Pickup

## 2022-05-23 NOTE — TELEPHONE ENCOUNTER
Refill Authorization Note   Abel Morris  is requesting a refill authorization.  Brief Assessment and Rationale for Refill:  Approve    -Medication-Related Problems Identified: Requires labs  Medication Therapy Plan:  Labs (lipid panel) due    Medication Reconciliation Completed: No   Comments:     Provider Staff:     Action is required for this patient.   Please see care gap opportunities below in Care Due Message.     Thanks!  [facility]     Appointments      Date Provider   Last Visit   10/18/2021 Sae Starkey MD   Next Visit   Visit date not found Sae Starkey MD     Note composed:3:29 PM 05/23/2022           Note composed:3:29 PM 05/23/2022

## 2022-05-24 ENCOUNTER — PATIENT MESSAGE (OUTPATIENT)
Dept: ENDOCRINOLOGY | Facility: CLINIC | Age: 57
End: 2022-05-24
Payer: COMMERCIAL

## 2022-05-24 DIAGNOSIS — E11.40 TYPE 2 DIABETES, CONTROLLED, WITH NEUROPATHY: ICD-10-CM

## 2022-05-24 RX ORDER — INSULIN GLARGINE 300 U/ML
INJECTION, SOLUTION SUBCUTANEOUS
Refills: 0 | OUTPATIENT
Start: 2022-05-24

## 2022-05-24 RX ORDER — METFORMIN HYDROCHLORIDE 1000 MG/1
TABLET ORAL
Qty: 180 TABLET | Refills: 0 | OUTPATIENT
Start: 2022-05-24

## 2022-05-31 ENCOUNTER — PATIENT MESSAGE (OUTPATIENT)
Dept: ADMINISTRATIVE | Facility: HOSPITAL | Age: 57
End: 2022-05-31
Payer: COMMERCIAL

## 2022-06-03 ENCOUNTER — PATIENT MESSAGE (OUTPATIENT)
Dept: FAMILY MEDICINE | Facility: CLINIC | Age: 57
End: 2022-06-03
Payer: COMMERCIAL

## 2022-06-03 RX ORDER — METFORMIN HYDROCHLORIDE 1000 MG/1
TABLET ORAL
Qty: 180 TABLET | Refills: 2 | Status: SHIPPED | OUTPATIENT
Start: 2022-06-03 | End: 2023-04-21

## 2022-06-03 NOTE — TELEPHONE ENCOUNTER
Care Due:                  Date            Visit Type   Department     Provider  --------------------------------------------------------------------------------                                EP [facility] FAMILY                              PRIMARY      MED/ INTERNAL  Sae Castro  Last Visit: 10-      CARE ([facility])   MED/ PEDS      Ehrensing  Next Visit: None Scheduled  None         None Found                                                            Last  Test          Frequency    Reason                     Performed    Due Date  --------------------------------------------------------------------------------    Lipid Panel.  12 months..  pravastatin..............  07- 07-    [facility] Embedded Care Gaps. Reference number: [id number]. 6/03/2022   9:55:31 AM CDT

## 2022-07-07 ENCOUNTER — PATIENT MESSAGE (OUTPATIENT)
Dept: FAMILY MEDICINE | Facility: CLINIC | Age: 57
End: 2022-07-07
Payer: COMMERCIAL

## 2022-07-09 ENCOUNTER — PATIENT MESSAGE (OUTPATIENT)
Dept: ADMINISTRATIVE | Facility: HOSPITAL | Age: 57
End: 2022-07-09
Payer: COMMERCIAL

## 2022-09-06 ENCOUNTER — PATIENT OUTREACH (OUTPATIENT)
Dept: ADMINISTRATIVE | Facility: HOSPITAL | Age: 57
End: 2022-09-06
Payer: COMMERCIAL

## 2022-09-06 DIAGNOSIS — E78.5 HYPERLIPIDEMIA, UNSPECIFIED HYPERLIPIDEMIA TYPE: Primary | ICD-10-CM

## 2022-09-23 ENCOUNTER — PATIENT OUTREACH (OUTPATIENT)
Dept: ADMINISTRATIVE | Facility: HOSPITAL | Age: 57
End: 2022-09-23
Payer: COMMERCIAL

## 2022-10-08 ENCOUNTER — PATIENT MESSAGE (OUTPATIENT)
Dept: ADMINISTRATIVE | Facility: HOSPITAL | Age: 57
End: 2022-10-08
Payer: COMMERCIAL

## 2022-10-10 ENCOUNTER — PATIENT MESSAGE (OUTPATIENT)
Dept: ADMINISTRATIVE | Facility: HOSPITAL | Age: 57
End: 2022-10-10
Payer: COMMERCIAL

## 2022-10-12 DIAGNOSIS — E11.40 TYPE 2 DIABETES MELLITUS WITH DIABETIC NEUROPATHY, WITHOUT LONG-TERM CURRENT USE OF INSULIN: ICD-10-CM

## 2022-12-16 ENCOUNTER — PATIENT OUTREACH (OUTPATIENT)
Dept: ADMINISTRATIVE | Facility: HOSPITAL | Age: 57
End: 2022-12-16
Payer: COMMERCIAL

## 2023-01-31 ENCOUNTER — PATIENT MESSAGE (OUTPATIENT)
Dept: FAMILY MEDICINE | Facility: CLINIC | Age: 58
End: 2023-01-31
Payer: COMMERCIAL

## 2023-02-23 NOTE — TELEPHONE ENCOUNTER
Left message on patient's voicemail to return call to clinic regarding scheduling Diabetes management appointment with Kirstin oHrta NP for elevated A1c. Waiting to hear back.   supervision/verbal cues

## 2023-02-24 ENCOUNTER — HOSPITAL ENCOUNTER (EMERGENCY)
Facility: HOSPITAL | Age: 58
Discharge: HOME | End: 2023-02-24
Attending: STUDENT IN AN ORGANIZED HEALTH CARE EDUCATION/TRAINING PROGRAM
Payer: COMMERCIAL

## 2023-02-24 VITALS
HEIGHT: 77 IN | HEART RATE: 84 BPM | DIASTOLIC BLOOD PRESSURE: 90 MMHG | WEIGHT: 249 LBS | TEMPERATURE: 98 F | SYSTOLIC BLOOD PRESSURE: 142 MMHG | RESPIRATION RATE: 18 BRPM | OXYGEN SATURATION: 100 % | BODY MASS INDEX: 29.4 KG/M2

## 2023-02-24 DIAGNOSIS — M25.512 ACUTE PAIN OF LEFT SHOULDER: Primary | ICD-10-CM

## 2023-02-24 DIAGNOSIS — R52 PAIN: ICD-10-CM

## 2023-02-24 LAB — POCT GLUCOSE: 219 MG/DL (ref 70–110)

## 2023-02-24 PROCEDURE — 96372 THER/PROPH/DIAG INJ SC/IM: CPT | Performed by: STUDENT IN AN ORGANIZED HEALTH CARE EDUCATION/TRAINING PROGRAM

## 2023-02-24 PROCEDURE — 63600175 PHARM REV CODE 636 W HCPCS: Mod: ER | Performed by: STUDENT IN AN ORGANIZED HEALTH CARE EDUCATION/TRAINING PROGRAM

## 2023-02-24 PROCEDURE — 99284 EMERGENCY DEPT VISIT MOD MDM: CPT | Mod: 25,ER

## 2023-02-24 PROCEDURE — 82962 GLUCOSE BLOOD TEST: CPT | Mod: ER

## 2023-02-24 RX ORDER — KETOROLAC TROMETHAMINE 30 MG/ML
15 INJECTION, SOLUTION INTRAMUSCULAR; INTRAVENOUS
Status: COMPLETED | OUTPATIENT
Start: 2023-02-24 | End: 2023-02-24

## 2023-02-24 RX ORDER — METHOCARBAMOL 500 MG/1
1000 TABLET, FILM COATED ORAL 3 TIMES DAILY PRN
Qty: 15 TABLET | Refills: 0 | Status: SHIPPED | OUTPATIENT
Start: 2023-02-24 | End: 2023-03-01

## 2023-02-24 RX ADMIN — KETOROLAC TROMETHAMINE 15 MG: 30 INJECTION, SOLUTION INTRAMUSCULAR; INTRAVENOUS at 08:02

## 2023-02-24 NOTE — ED PROVIDER NOTES
Encounter Date: 2/24/2023    SCRIBE #1 NOTE: I, Gloria Mendez, am scribing for, and in the presence of,  Maria M Abbasi DO. I have scribed the following portions of the note - Other sections scribed: HPI; ROS; PE.     History     Chief Complaint   Patient presents with    Shoulder Pain     Pt states Left shoulder pain x1 w/ pt states he has Hx of arthritis in joint      Abel Morris is a 57 y.o. male with Hx of Arthritis, DM, HTN, and HLD who presents to the ED for chief complaint of sharp left shoulder pain onset 1 week ago. Patient reports no injuries. 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 Patient notes the pain moves to the left lateral neck and posterior shoulder and rates it a 7/10. He attempted treatment with Asprin, Motrin, and Ibuprofen, with his last dose of motrin at 7:30 PM yesterday. Patient denies associated chest pain, shortness of breath, nausea, vomiting, constipation, back pain, numbness, tingling, or weakness. No further complaints at this time. Patient notes he does have arthritis in the bilateral wrists. Patient does not have any medical allergies.     The history is provided by the patient. No  was used.   Review of patient's allergies indicates:  No Known Allergies  Past Medical History:   Diagnosis Date    Arthritis     Diabetes mellitus type II, uncontrolled 11/28/2012    Diabetes mellitus with neurological manifestations, uncontrolled 6/13/2014    ED (erectile dysfunction) 11/28/2012    Eye injury     os hit broken orbital bone     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    History of colonic polyps 8/17/2018    Hyperlipidemia 11/28/2012    Poor compliance 1/8/2014    Primary hypertension 10/9/2021     Past Surgical History:   Procedure Laterality Date    COLONOSCOPY N/A 8/2/2016    Procedure: COLONOSCOPY;  Surgeon: Miguel Persaud MD;  [facility];  Service: Endoscopy;  Laterality: N/A;    COLONOSCOPY N/A 9/3/2019     Procedure: COLONOSCOPY;  Surgeon: Álvaro Carmichael MD;  Location: [facility];  Service: Endoscopy;  Laterality: N/A;  confirmed appt- sp    LUMBAR PUNCTURE N/A 09/19/2019     Family History   Problem Relation Age of Onset    No Known Problems Mother     Colon polyps Father 61    No Known Problems Sister     No Known Problems Brother     No Known Problems Maternal Aunt     No Known Problems Maternal Uncle     No Known Problems Paternal Aunt     No Known Problems Paternal Uncle     Diabetes Maternal Grandmother     No Known Problems Maternal Grandfather     No Known Problems Paternal Grandmother     No Known Problems Paternal Grandfather     Amblyopia Neg Hx     Blindness Neg Hx     Cancer Neg Hx     Cataracts Neg Hx     Glaucoma Neg Hx     Hypertension Neg Hx     Macular degeneration Neg Hx     Retinal detachment Neg Hx     Strabismus Neg Hx     Stroke Neg Hx     Thyroid disease Neg Hx      Social History     Tobacco Use    Smoking status: Former     Types: Cigars    Smokeless tobacco: Never   Substance Use Topics    Alcohol use: Yes     Alcohol/week: 6.0 standard drinks     Types: 6 Cans of beer per week     Comment: per week    Drug use: No     Review of Systems   Constitutional:  Negative for chills and fever.   HENT:  Negative for drooling, facial swelling and trouble swallowing.    Eyes:  Negative for redness and visual disturbance.   Respiratory:  Negative for cough, shortness of breath and stridor.    Cardiovascular:  Negative for chest pain.   Gastrointestinal:  Negative for abdominal pain, constipation, nausea and vomiting.   Genitourinary:  Negative for dysuria and hematuria.   Musculoskeletal:  Positive for arthralgias and neck pain. Negative for back pain, gait problem and neck stiffness.   Skin:  Negative for pallor and wound.   Neurological:  Negative for syncope, facial asymmetry, speech difficulty, weakness, light-headedness and numbness.        Negative for tingling.    Psychiatric/Behavioral:   Negative for confusion.    All other systems reviewed and are negative.    Physical Exam     Initial Vitals [02/24/23 0733]   BP Pulse Resp Temp SpO2   (!) 155/100 87 20 98.4 °F (36.9 °C) 100 %      MAP       --         Physical Exam    Nursing note and vitals reviewed.  Constitutional: Vital signs are normal. He appears well-developed and well-nourished. He is not diaphoretic.  Non-toxic appearance. He does not have a sickly appearance. He does not appear ill.   HENT:   Head: Normocephalic and atraumatic.   Right Ear: External ear normal.   Left Ear: External ear normal.   Mouth/Throat: Uvula is midline, oropharynx is clear and moist and mucous membranes are normal.   Eyes: Conjunctivae are normal. No scleral icterus.   Neck: Neck supple. No JVD present.   Normal range of motion.  Cardiovascular:  Normal rate, regular rhythm, normal heart sounds and intact distal pulses.           Pulmonary/Chest: Breath sounds normal. No stridor. No respiratory distress.   Abdominal: Abdomen is soft. He exhibits no distension. There is no abdominal tenderness. There is no rebound and no guarding.   Musculoskeletal:         General: Tenderness present. No edema. Normal range of motion.      Cervical back: Normal range of motion and neck supple. No rigidity. No spinous process tenderness.      Comments: Tenderness to palpation over the posterior aspect of the left trapezius with increased muscle tension.      Lymphadenopathy:     He has no cervical adenopathy.   Neurological: He is alert. He has normal strength. No sensory deficit. He displays a negative Romberg sign.   Moves all extremities, follows all commands, no focal neurologic deficits.      Skin: Skin is warm and dry. No rash noted. No erythema.   Psychiatric: He has a normal mood and affect.       ED Course   Procedures  Labs Reviewed   POCT GLUCOSE - Abnormal; Notable for the following components:       Result Value    POCT Glucose 219 (*)     All other components within  normal limits     EKG Readings: (Independently Interpreted)    normal sinus rhythm with a rate of 80 beats per minute, normal axis and intervals,   Baseline artifact in V1, no acute ST segment changes     Imaging Results              X-Ray Shoulder 2 or More Views Left (Final result)  Result time 02/24/23 08:18:03      Final result by Jerzy Bruno MD (02/24/23 08:18:03)                   Impression:      Mild degenerative disease.  No evidence for acute injury      Electronically signed by: Jerzy Bruno MD  Date:    02/24/2023  Time:    08:18               Narrative:    EXAMINATION:  XR SHOULDER COMPLETE 2 OR MORE VIEWS LEFT    CLINICAL HISTORY:  Shoulder pain;    TECHNIQUE:  Two or three views of the left shoulder were performed.    COMPARISON:  None    FINDINGS:  AP, axillary and scapular Y radiographs of the left shoulder demonstrate no evidence of acute fracture or dislocation.  Mild glenohumeral joint space narrowing is noted.  Otherwise, humeral head is well seen within the glenoid fossa.  The acromioclavicular distance is within normal limits.  Visualized left hemithorax is clear.                                       Medications   ketorolac injection 15 mg (15 mg Intramuscular Given 2/24/23 0805)     Medical Decision Making:   History:   Old Medical Records: I decided to obtain old medical records.  Independently Interpreted Test(s):   I have ordered and independently interpreted X-rays - see prior notes.  Clinical Tests:   Lab Tests: Ordered and Reviewed  Radiological Study: Ordered and Reviewed  ED Management:   [facility]  This is an emergent evaluation of a 57 y.o. male with Hx of Arthritis, DM, HTN, and HLD who presents to the ED for chief complaint of sharp left shoulder pain onset 1 week ago. Initial vitals in the ED  [02/24/23 0733]    BP: (!) 155/100  Pulse: 87  Resp: 20  Temp: 98.4 °F (36.9 °C)  SpO2: 100 % .     Physical exam noted above. DDx includes but is not limited to arthritis, musculoskeletal  pain, muscle strain. Also considered but clinically less likely to be fracture, dislocation, septic joint. Will obtain labs and imaging including POC glucose, xray of the left shoulder. Will also provide PO pain medication. Will continue to monitor and frequently reassess pending results of labs, treatments and final disposition.    Patient is aware of plan and is amenable.     Maria M Abbasi D.O  EMERGENCY MEDICINE  8:33 AM 02/24/2023    UPDATE:    Patient presents for emergent evaluation of acute left shoulder pain.    In the ED patient found to have acute muscle strain.    I ordered labs and personally reviewed them. Labs significant for mildly elevated POC glucose.    I ordered X-rays and personally reviewed them and reviewed the radiologist interpretation. Xray significant for mild glenohumeral joint space narrowing.    I ordered EKG and personally reviewed. EKG significant for no acute ischemic changes.     Patient was managed in the ED with IM Toradol.     The response to treatment was improvement of her symptoms.    Patient was discharged in stable condition, with a muscle relaxer, PCP follow-up. Detailed return precautions discussed.    This chart was completed using dictation software, as a result there may be some transcription errors         Scribe Attestation:   Scribe #1: I performed the above scribed service and the documentation accurately describes the services I performed. I attest to the accuracy of the note.            Scribe attestation: I, Maria M Abbasi, DO, personally performed the services described in this documentation.  All medical record entries made by the scribe were at my direction and in my presence.  I have reviewed the chart and agree that the record reflects my personal performance and is accurate and complete.         Clinical Impression:   Final diagnoses:  [R52] Pain  [M25.512] Acute pain of left shoulder (Primary)        ED Disposition Condition    Discharge  Stable          ED Prescriptions       Medication Sig Dispense Start Date End Date Auth. Provider    methocarbamoL (ROBAXIN) 500 MG Tab Take 2 tablets (1,000 mg total) by mouth 3 (three) times daily as needed (sholder pain). 15 tablet 2/24/2023 3/1/2023 Maria M Abbasi DO          Follow-up Information       Follow up With Specialties Details Why Contact Info    Sae Starkey MD Internal Medicine Schedule an appointment as soon as possible for a visit in 1 week Emergency Room Follow-up [address] [facility] [address]  [phone]      [facility] ED Emergency Medicine Go to  If symptoms worsen [address] [facility] [address] [phone]             Maria M Abbasi DO  02/26/23 2140

## 2023-02-24 NOTE — DISCHARGE INSTRUCTIONS
Thank you for coming to our Emergency Department today. It is important to remember that some problems or medical conditions are difficult to diagnose and may not be found during your Emergency Department visit.     Be sure to follow up with your primary care doctor and review all labs/imaging/tests that were performed during your ER visit with them. Some labs/tests may be outside of the normal range and require non-emergent follow-up and further investigation to help diagnose/exclude/prevent complications or other potentially serious medical conditions that were not addressed during your ER visit.    If you do not have a primary care doctor, you may contact the one listed on your discharge paperwork or you may also call the [facility] Appointment Desk at [phone] to schedule an appointment and establish care with one. It is important to your health that you have a primary care doctor.    Please take all medications as directed. All medications may potentially have side-effects and it is impossible to predict which medications may give you side-effects or what side-effects (if any) they will give you.. If you feel that you are having a negative effect or side-effect of any medication you should immediately stop taking them and seek medical attention. If you feel that you are having a life-threatening reaction call 911.    Return to the ER with any questions/concerns, new/concerning symptoms, worsening or failure to improve.     Do not drive, swim, climb to height, take a bath, operate heavy machinery, drink alcohol or take potentially sedating medications, sign any legal documents or make any important decisions for 24 hours if you have received any pain medications, sedatives or mood altering drugs during your ER visit or within 24 hours of taking them if they have been prescribed to you.     You can find additional resources for Dentists, hearing aids, durable medical equipment, low cost pharmacies and  other resources at https://[facility].org    BELOW THIS LINE ONLY APPLIES IF YOU HAVE A COVID TEST PENDING OR IF YOU HAVE BEEN DIAGNOSED WITH COVID:  Please access [facility] to review the results of your test. Until the results of your COVID test return, you should isolate yourself so as not to potentially spread illness to others.   If your COVID test returns positive, you should isolate yourself so as not to spread illness to others. After five full days, if you are feeling better and you have not had fever for 24 hours, you can return to your typical daily activities, but you must wear a mask around others for an additional 5 days.   If your COVID test returns negative and you are either unvaccinated or more than six months out from your two-dose vaccine and are not yet boosted, you should still quarantine for 5 full days followed by strict mask use for an additional 5 full days.   If your COVID test returns negative and you have received your 2-dose initial vaccine as well as a booster, you should continue strict mask use for 10 full days after the exposure.  For all those exposed, best practice includes a test at day 5 after the exposure. This can be a home test or a test through one of the many testing centers throughout our community.   Masking is always advised to limit the spread of COVID. Cdc.gov is an excellent site to obtain the latest up to date recommendations regarding COVID and COVID testing.     CDC Testing and Quarantine Guidelines for patients with exposure to a known-positive COVID-19 person:  A close exposure is defined as anyone who has had an exposure (masked or unmasked) to a known COVID -19 positive person within 6 feet of someone for a cumulative total of 15 minutes or more over a 24-hour period.   Vaccinated and/or if you recently had a positive covid test within 90 days do NOT need to quarantine after contact with someone who had COVID-19 unless you develop symptoms.   Fully vaccinated  people who have not had a positive test within 90 days, should get tested 3-5 days after their exposure, even if they don't have symptoms and wear a mask indoors in public for 14 days following exposure or until their test result is negative.      Unvaccinated and/or NOT had a positive test within 90 days and meet close exposure  You are required by CDC guidelines to quarantine for at least 5 days from time of exposure followed by 5 days of strict masking. It is recommended, but not required to test after 5 days, unless you develop symptoms, in which case you should test at that time.  If you get tested after 5 days and your test is positive, your 5 day period of isolation starts the day of the positive test.    If your exposure does not meet the above definition, you can return to your normal daily activities to include social distancing, wearing a mask and frequent handwashing.      Here is a link to guidance from the CDC:  https://www.cdc.gov/media/releases/2021/s1227-isolation-quarantine-guidance.html      [place] Dept Of Health Testing Sites:  https://ldh.la.gov/page/3934      [facility] website with testing locations and guidance:  https://[email or web address]

## 2023-02-28 ENCOUNTER — PATIENT OUTREACH (OUTPATIENT)
Dept: ADMINISTRATIVE | Facility: HOSPITAL | Age: 58
End: 2023-02-28
Payer: COMMERCIAL

## 2023-02-28 DIAGNOSIS — E11.40 TYPE 2 DIABETES MELLITUS WITH DIABETIC NEUROPATHY, WITHOUT LONG-TERM CURRENT USE OF INSULIN: Primary | ICD-10-CM

## 2023-03-03 ENCOUNTER — LAB VISIT (OUTPATIENT)
Dept: LAB | Facility: HOSPITAL | Age: 58
End: 2023-03-03
Attending: INTERNAL MEDICINE
Payer: COMMERCIAL

## 2023-03-03 DIAGNOSIS — E78.5 HYPERLIPIDEMIA, UNSPECIFIED HYPERLIPIDEMIA TYPE: ICD-10-CM

## 2023-03-03 DIAGNOSIS — E11.40 TYPE 2 DIABETES MELLITUS WITH DIABETIC NEUROPATHY, WITHOUT LONG-TERM CURRENT USE OF INSULIN: ICD-10-CM

## 2023-03-03 LAB
ALBUMIN SERPL BCP-MCNC: 4 G/DL (ref 3.5–5.2)
ALP SERPL-CCNC: 69 U/L (ref 55–135)
ALT SERPL W/O P-5'-P-CCNC: 37 U/L (ref 10–44)
ANION GAP SERPL CALC-SCNC: 8 MMOL/L (ref 8–16)
AST SERPL-CCNC: 34 U/L (ref 10–40)
BILIRUB SERPL-MCNC: 0.9 MG/DL (ref 0.1–1)
BUN SERPL-MCNC: 10 MG/DL (ref 6–20)
CALCIUM SERPL-MCNC: 9.3 MG/DL (ref 8.7–10.5)
CHLORIDE SERPL-SCNC: 101 MMOL/L (ref 95–110)
CHOLEST SERPL-MCNC: 212 MG/DL (ref 120–199)
CHOLEST/HDLC SERPL: 4.8 {RATIO} (ref 2–5)
CO2 SERPL-SCNC: 26 MMOL/L (ref 23–29)
CREAT SERPL-MCNC: 0.9 MG/DL (ref 0.5–1.4)
EST. GFR  (NO RACE VARIABLE): >60 ML/MIN/1.73 M^2
ESTIMATED AVG GLUCOSE: 223 MG/DL (ref 68–131)
GLUCOSE SERPL-MCNC: 245 MG/DL (ref 70–110)
HBA1C MFR BLD: 9.4 % (ref 4–5.6)
HDLC SERPL-MCNC: 44 MG/DL (ref 40–75)
HDLC SERPL: 20.8 % (ref 20–50)
LDLC SERPL CALC-MCNC: 137.6 MG/DL (ref 63–159)
NONHDLC SERPL-MCNC: 168 MG/DL
POTASSIUM SERPL-SCNC: 3.9 MMOL/L (ref 3.5–5.1)
PROT SERPL-MCNC: 6.9 G/DL (ref 6–8.4)
SODIUM SERPL-SCNC: 135 MMOL/L (ref 136–145)
TRIGL SERPL-MCNC: 152 MG/DL (ref 30–150)

## 2023-03-03 PROCEDURE — 83036 HEMOGLOBIN GLYCOSYLATED A1C: CPT | Performed by: INTERNAL MEDICINE

## 2023-03-03 PROCEDURE — 80061 LIPID PANEL: CPT | Performed by: INTERNAL MEDICINE

## 2023-03-03 PROCEDURE — 36415 COLL VENOUS BLD VENIPUNCTURE: CPT | Mod: PO | Performed by: INTERNAL MEDICINE

## 2023-03-03 PROCEDURE — 80053 COMPREHEN METABOLIC PANEL: CPT | Performed by: INTERNAL MEDICINE

## 2023-03-06 ENCOUNTER — PATIENT MESSAGE (OUTPATIENT)
Dept: FAMILY MEDICINE | Facility: CLINIC | Age: 58
End: 2023-03-06
Payer: COMMERCIAL

## 2023-03-06 NOTE — TELEPHONE ENCOUNTER
Please advise,      Dr. Starkey, I'm still experiencing a lot of pain in my shoulder and my shoulder is going mumb. Can you refer me to a doctor that could run the test needed to identify the problem?

## 2023-03-07 ENCOUNTER — OFFICE VISIT (OUTPATIENT)
Dept: FAMILY MEDICINE | Facility: CLINIC | Age: 58
End: 2023-03-07
Payer: COMMERCIAL

## 2023-03-07 VITALS
WEIGHT: 233.25 LBS | SYSTOLIC BLOOD PRESSURE: 120 MMHG | DIASTOLIC BLOOD PRESSURE: 76 MMHG | OXYGEN SATURATION: 97 % | BODY MASS INDEX: 27.54 KG/M2 | TEMPERATURE: 98 F | HEART RATE: 108 BPM | HEIGHT: 77 IN

## 2023-03-07 DIAGNOSIS — E66.3 OVERWEIGHT (BMI 25.0-29.9): ICD-10-CM

## 2023-03-07 DIAGNOSIS — E78.5 HYPERLIPIDEMIA, UNSPECIFIED HYPERLIPIDEMIA TYPE: ICD-10-CM

## 2023-03-07 DIAGNOSIS — E11.65 UNCONTROLLED TYPE 2 DIABETES MELLITUS WITH HYPERGLYCEMIA: ICD-10-CM

## 2023-03-07 DIAGNOSIS — M25.512 ACUTE PAIN OF LEFT SHOULDER: Primary | ICD-10-CM

## 2023-03-07 PROCEDURE — 3066F PR DOCUMENTATION OF TREATMENT FOR NEPHROPATHY: ICD-10-PCS | Mod: CPTII,S$GLB,, | Performed by: FAMILY MEDICINE

## 2023-03-07 PROCEDURE — 3008F PR BODY MASS INDEX (BMI) DOCUMENTED: ICD-10-PCS | Mod: CPTII,S$GLB,, | Performed by: FAMILY MEDICINE

## 2023-03-07 PROCEDURE — 3078F DIAST BP <80 MM HG: CPT | Mod: CPTII,S$GLB,, | Performed by: FAMILY MEDICINE

## 2023-03-07 PROCEDURE — 3008F BODY MASS INDEX DOCD: CPT | Mod: CPTII,S$GLB,, | Performed by: FAMILY MEDICINE

## 2023-03-07 PROCEDURE — 3061F NEG MICROALBUMINURIA REV: CPT | Mod: CPTII,S$GLB,, | Performed by: FAMILY MEDICINE

## 2023-03-07 PROCEDURE — 1159F PR MEDICATION LIST DOCUMENTED IN MEDICAL RECORD: ICD-10-PCS | Mod: CPTII,S$GLB,, | Performed by: FAMILY MEDICINE

## 2023-03-07 PROCEDURE — 3046F PR MOST RECENT HEMOGLOBIN A1C LEVEL > 9.0%: ICD-10-PCS | Mod: CPTII,S$GLB,, | Performed by: FAMILY MEDICINE

## 2023-03-07 PROCEDURE — 99999 PR PBB SHADOW E&M-EST. PATIENT-LVL V: ICD-10-PCS | Mod: PBBFAC,,, | Performed by: FAMILY MEDICINE

## 2023-03-07 PROCEDURE — 99214 PR OFFICE/OUTPT VISIT, EST, LEVL IV, 30-39 MIN: ICD-10-PCS | Mod: S$GLB,,, | Performed by: FAMILY MEDICINE

## 2023-03-07 PROCEDURE — 3066F NEPHROPATHY DOC TX: CPT | Mod: CPTII,S$GLB,, | Performed by: FAMILY MEDICINE

## 2023-03-07 PROCEDURE — 99999 PR PBB SHADOW E&M-EST. PATIENT-LVL V: CPT | Mod: PBBFAC,,, | Performed by: FAMILY MEDICINE

## 2023-03-07 PROCEDURE — 1159F MED LIST DOCD IN RCRD: CPT | Mod: CPTII,S$GLB,, | Performed by: FAMILY MEDICINE

## 2023-03-07 PROCEDURE — 3061F PR NEG MICROALBUMINURIA RESULT DOCUMENTED/REVIEW: ICD-10-PCS | Mod: CPTII,S$GLB,, | Performed by: FAMILY MEDICINE

## 2023-03-07 PROCEDURE — 1160F RVW MEDS BY RX/DR IN RCRD: CPT | Mod: CPTII,S$GLB,, | Performed by: FAMILY MEDICINE

## 2023-03-07 PROCEDURE — 3078F PR MOST RECENT DIASTOLIC BLOOD PRESSURE < 80 MM HG: ICD-10-PCS | Mod: CPTII,S$GLB,, | Performed by: FAMILY MEDICINE

## 2023-03-07 PROCEDURE — 1160F PR REVIEW ALL MEDS BY PRESCRIBER/CLIN PHARMACIST DOCUMENTED: ICD-10-PCS | Mod: CPTII,S$GLB,, | Performed by: FAMILY MEDICINE

## 2023-03-07 PROCEDURE — 99214 OFFICE O/P EST MOD 30 MIN: CPT | Mod: S$GLB,,, | Performed by: FAMILY MEDICINE

## 2023-03-07 PROCEDURE — 3046F HEMOGLOBIN A1C LEVEL >9.0%: CPT | Mod: CPTII,S$GLB,, | Performed by: FAMILY MEDICINE

## 2023-03-07 PROCEDURE — 3074F PR MOST RECENT SYSTOLIC BLOOD PRESSURE < 130 MM HG: ICD-10-PCS | Mod: CPTII,S$GLB,, | Performed by: FAMILY MEDICINE

## 2023-03-07 PROCEDURE — 3074F SYST BP LT 130 MM HG: CPT | Mod: CPTII,S$GLB,, | Performed by: FAMILY MEDICINE

## 2023-03-07 RX ORDER — CELECOXIB 200 MG/1
200 CAPSULE ORAL 2 TIMES DAILY
Qty: 30 CAPSULE | Refills: 0 | Status: SHIPPED | OUTPATIENT
Start: 2023-03-07

## 2023-03-07 RX ORDER — TIZANIDINE 4 MG/1
4 TABLET ORAL EVERY 6 HOURS PRN
Qty: 30 TABLET | Refills: 0 | Status: SHIPPED | OUTPATIENT
Start: 2023-03-07 | End: 2023-03-17

## 2023-03-07 NOTE — PROGRESS NOTES
"Routine Office Visit    Abel Morris  1965  [id number]      Subjective     Abel is a 57 y.o. male who presents today for:    Left shoulder pain - started 12 days ago -  pain is described as an annoying pain. Patient was initially evaluated in the ER and had an x-ray. Pain started in his left upper arm and radiates to his shoulder and neck and radiates behind his scapula down to his triceps. 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 Patient has been taking ibuprofen and aspirin with no relief of symptoms. Patient has also been using ice with no relief of symptoms. Pain is alleviated with movement. No trauma or injuries. No falls.     Objective     Review of Systems   Constitutional:  Negative for chills and fever.   HENT:  Negative for congestion.    Eyes:  Negative for blurred vision.   Respiratory:  Negative for cough.    Cardiovascular:  Negative for chest pain.   Gastrointestinal:  Negative for abdominal pain, constipation, diarrhea, heartburn, nausea and vomiting.   Genitourinary:  Negative for dysuria.   Musculoskeletal:  Negative for myalgias.   Skin:  Negative for itching and rash.   Neurological:  Negative for dizziness and headaches.   Psychiatric/Behavioral:  Negative for depression.      /76   Pulse 108   Temp 98.2 °F (36.8 °C) (Oral)   Ht 6' 5" (1.956 m)   Wt 105.8 kg (233 lb 4 oz)   SpO2 97%   BMI 27.66 kg/m²   Physical Exam  Constitutional:       Appearance: He is well-developed.   HENT:      Head: Normocephalic and atraumatic.   Eyes:      Conjunctiva/sclera: Conjunctivae normal.      Pupils: Pupils are equal, round, and reactive to light.   Neck:      Thyroid: No thyromegaly.      Vascular: No JVD.   Cardiovascular:      Rate and Rhythm: Normal rate and regular rhythm.      Heart sounds: Normal heart sounds.   Pulmonary:      Effort: Pulmonary effort is normal.      Breath sounds: Normal breath sounds. No wheezing.   Abdominal:      General: Bowel sounds are " normal. There is no distension.      Palpations: Abdomen is soft.      Tenderness: There is no abdominal tenderness. There is no guarding.   Musculoskeletal:      Left shoulder: Tenderness present. No deformity or bony tenderness. Decreased range of motion.      Cervical back: Normal range of motion and neck supple.   Lymphadenopathy:      Cervical: No cervical adenopathy.   Skin:     General: Skin is warm and dry.   Neurological:      Mental Status: He is alert and oriented to person, place, and time.   Psychiatric:         Behavior: Behavior normal.           Assessment     Health Maintenance         Date Due Completion Date    HIV Screening Never done ---    TETANUS VACCINE Never done ---    Shingles Vaccine (1 of 2) Never done ---    Pneumococcal Vaccines (Age 0-64) (2 - PCV) 10/03/2020 10/3/2019    COVID-19 Vaccine (4 - Booster for Pfizer series) 01/14/2022 11/19/2021    Eye Exam 02/04/2022 2/4/2021    Override on 8/15/2013: Done    Foot Exam 07/21/2022 7/21/2021    Override on 2/10/2021: Done    Influenza Vaccine (1) 09/01/2022 9/1/2020    Hemoglobin A1c 06/03/2023 3/3/2023    Diabetes Urine Screening 03/03/2024 3/3/2023    Lipid Panel 03/03/2024 3/3/2023    Low Dose Statin 03/07/2024 3/7/2023    Colorectal Cancer Screening 09/03/2024 9/3/2019              Problem List Items Addressed This Visit    None  Visit Diagnoses       Acute pain of left shoulder    -  Primary    Relevant Orders    Ambulatory referral/consult to Orthopedics  -     tiZANidine (ZANAFLEX) 4 MG tablet; Take 1 tablet (4 mg total) by mouth every 6 (six) hours as needed (muscle spasm).  Dispense: 30 tablet; Refill: 0  -     celecoxib (CELEBREX) 200 MG capsule; Take 1 capsule (200 mg total) by mouth 2 (two) times daily.  Dispense: 30 capsule; Refill: 0  Reviewed x-ray   RICE   Recommend Physical Therapy   X-ray shows arthritis   Recommend nsaids   Patient with uncontrolled diabetes - unable to prescribe short course of steroids         Uncontrolled type 2 diabetes mellitus with hyperglycemia  Recommend to f/u with pcp regarding diabetes    Hyperlipidemia, unspecified hyperlipidemia type  The current medical regimen is effective;  continue present plan and medications.    Overweight (BMI 25.0-29.9)  The patient is asked to make an attempt to improve diet and exercise patterns to aid in medical management of this problem.                        Follow up if symptoms worsen or fail to improve, for chronic conditions follow-up, if symptoms worsen.

## 2023-03-14 DIAGNOSIS — M25.512 LEFT SHOULDER PAIN, UNSPECIFIED CHRONICITY: Primary | ICD-10-CM

## 2023-03-15 ENCOUNTER — APPOINTMENT (OUTPATIENT)
Dept: RADIOLOGY | Facility: HOSPITAL | Age: 58
End: 2023-03-15
Attending: ORTHOPAEDIC SURGERY
Payer: COMMERCIAL

## 2023-03-15 DIAGNOSIS — M25.512 LEFT SHOULDER PAIN, UNSPECIFIED CHRONICITY: ICD-10-CM

## 2023-03-15 PROCEDURE — 73030 X-RAY EXAM OF SHOULDER: CPT | Mod: 26,LT,, | Performed by: RADIOLOGY

## 2023-03-15 PROCEDURE — 73030 X-RAY EXAM OF SHOULDER: CPT | Mod: TC,FY,PN,LT

## 2023-03-15 PROCEDURE — 73030 XR SHOULDER COMPLETE 2 OR MORE VIEWS LEFT: ICD-10-PCS | Mod: 26,LT,, | Performed by: RADIOLOGY

## 2023-03-16 ENCOUNTER — OFFICE VISIT (OUTPATIENT)
Dept: ORTHOPEDICS | Facility: CLINIC | Age: 58
End: 2023-03-16
Payer: COMMERCIAL

## 2023-03-16 DIAGNOSIS — M25.512 ACUTE PAIN OF LEFT SHOULDER: Primary | ICD-10-CM

## 2023-03-16 DIAGNOSIS — M54.12 CERVICAL RADICULOPATHY: ICD-10-CM

## 2023-03-16 PROCEDURE — 99999 PR PBB SHADOW E&M-EST. PATIENT-LVL V: CPT | Mod: PBBFAC,,, | Performed by: ORTHOPAEDIC SURGERY

## 2023-03-16 PROCEDURE — 1160F RVW MEDS BY RX/DR IN RCRD: CPT | Mod: CPTII,S$GLB,, | Performed by: ORTHOPAEDIC SURGERY

## 2023-03-16 PROCEDURE — 3046F PR MOST RECENT HEMOGLOBIN A1C LEVEL > 9.0%: ICD-10-PCS | Mod: CPTII,S$GLB,, | Performed by: ORTHOPAEDIC SURGERY

## 2023-03-16 PROCEDURE — 99999 PR PBB SHADOW E&M-EST. PATIENT-LVL V: ICD-10-PCS | Mod: PBBFAC,,, | Performed by: ORTHOPAEDIC SURGERY

## 2023-03-16 PROCEDURE — 3061F NEG MICROALBUMINURIA REV: CPT | Mod: CPTII,S$GLB,, | Performed by: ORTHOPAEDIC SURGERY

## 2023-03-16 PROCEDURE — 99204 OFFICE O/P NEW MOD 45 MIN: CPT | Mod: S$GLB,,, | Performed by: ORTHOPAEDIC SURGERY

## 2023-03-16 PROCEDURE — 1159F PR MEDICATION LIST DOCUMENTED IN MEDICAL RECORD: ICD-10-PCS | Mod: CPTII,S$GLB,, | Performed by: ORTHOPAEDIC SURGERY

## 2023-03-16 PROCEDURE — 1160F PR REVIEW ALL MEDS BY PRESCRIBER/CLIN PHARMACIST DOCUMENTED: ICD-10-PCS | Mod: CPTII,S$GLB,, | Performed by: ORTHOPAEDIC SURGERY

## 2023-03-16 PROCEDURE — 3061F PR NEG MICROALBUMINURIA RESULT DOCUMENTED/REVIEW: ICD-10-PCS | Mod: CPTII,S$GLB,, | Performed by: ORTHOPAEDIC SURGERY

## 2023-03-16 PROCEDURE — 3046F HEMOGLOBIN A1C LEVEL >9.0%: CPT | Mod: CPTII,S$GLB,, | Performed by: ORTHOPAEDIC SURGERY

## 2023-03-16 PROCEDURE — 3066F PR DOCUMENTATION OF TREATMENT FOR NEPHROPATHY: ICD-10-PCS | Mod: CPTII,S$GLB,, | Performed by: ORTHOPAEDIC SURGERY

## 2023-03-16 PROCEDURE — 3066F NEPHROPATHY DOC TX: CPT | Mod: CPTII,S$GLB,, | Performed by: ORTHOPAEDIC SURGERY

## 2023-03-16 PROCEDURE — 1159F MED LIST DOCD IN RCRD: CPT | Mod: CPTII,S$GLB,, | Performed by: ORTHOPAEDIC SURGERY

## 2023-03-16 PROCEDURE — 99204 PR OFFICE/OUTPT VISIT, NEW, LEVL IV, 45-59 MIN: ICD-10-PCS | Mod: S$GLB,,, | Performed by: ORTHOPAEDIC SURGERY

## 2023-03-16 RX ORDER — TRAMADOL HYDROCHLORIDE 50 MG/1
50 TABLET ORAL EVERY 8 HOURS PRN
Qty: 21 TABLET | Refills: 0 | Status: SHIPPED | OUTPATIENT
Start: 2023-03-16 | End: 2023-03-23

## 2023-03-16 NOTE — PROGRESS NOTES
Assessment: 57 y.o. male with left rotator cuff tendinitis, cervical pathology     I explained my diagnostic impression and the reasoning behind it in detail, using layman's terms.      Plan:   - Treatments limited due to poorly controlled DM - cannot do injection due to A1c of 9.4  - Continue celebrex BID - sometimes takes about 2 weeks to work   - Referred to pain for cervical pathology   - Cannot work right now due to pain   - Short Rx of tramadol given - will not refill   - PT referral placed  - Return to clinic in 12 weeks. Return sooner if symptoms worsen or fail to improve.    All questions were answered in detail. The patient is in full agreement with the treatment plan and will proceed accordingly.    Chief Complaint   Patient presents with    Left Shoulder - Pain, Numbness       Initial visit (3/16/23): Abel Morris is a 57 y.o. male who presents today complaining of left shoulder pain  Duration of symptoms:  about 4 weeks  Trauma or new activity: no  Pain is constant  Tried multiple OTC NSAIDs at home without relief   Pain was so severe about 2 weeks after pain started that he had to go to the ER.  They gave him an IM toradol and robaxin without any relief. Saw his PCP - has tried celebrex and tizandine also without any relief   Aggravating factors: reaching overhead, reaching in front  Relieving factors: rest  Cannot tie his shoes because his pain is so bad   Night pain is present and is disruptive to sleep  Radicular symptoms: + neck pain, pain radiates down to the hand, + tingling in hand    Pain does interfere with duties at work, sleep, and activities of daily living .  Does not tolerate gabapentin     This patient was seen in consultation at the request of Dr. Marga Keith    This is the extent of the patient's complaints at this time.     Hand dominance: Right     Occupation: [employer]      Review of patient's allergies indicates:  No Known Allergies      Physical Exam:   Vitals:    03/16/23  0801   PainSc: 10-Worst pain ever   PainLoc: Shoulder     General: Patient is alert, awake and oriented to time, place and person. Mood and affect are appropriate.  Patient does not appear to be in any distress, denies any constitutional symptoms and appears stated age.   HEENT: Pupils are equal and round, sclera are not injected. External examination of ears and nose reveals no abnormalities. Cranial nerves II-X are grossly intact  Neck: examination demonstrates painless  active range of motion. Spurling's sign is positive  Skin: no rashes, abrasions or open wounds on the affected extremity   Resp: No respiratory distress or audible wheezing   CV: 2+  pulses, all extremities warm and well perfused   Left Shoulder    Shoulder Range of Motion    Right     Left   (Active/Passive)       Forward Elevation     165/165           130/140  External rotation (arm at side)  45/45             45/45   Internal rotation behind the back  L5             L5     Range of motion is painful     Acromioclavicular joint is not tender  Crossbody test: negative    Neer's positive  Hawkin's positive    Byron's positive  Drop arm negative  Belly press negative      Cuff Strength     Right     Left   Supraspinatus        5/5    5/5  Infraspinatus     5/5    5/5  Subscapularis     5/5    5/5    Deltoid testing            5/5    5/5    Speeds negative  Yergasons negative    Elbow examination demonstrates no tenderness to palpation and has normal range of motion.     ltsi C5-T1  + epl, io, fds, fdp   2+ RP      Imaging: 3 views of the left shoulder:  negative for degenerative changes of the AC joint. The humeral head is well centered on the AP and axillary views.  No calcification or cortical changes at the rotator cuff insertion on the greater tuberosity. There is not significant degenerative change of the glenohumeral joint or posterior subluxation of the humeral head. No acute changes or fracture.      I personally reviewed and interpreted  "the patient's imaging obtained today in clinic     A note notifying Dr. Marga Keith of my findings was sent via the electronic medical record     This note was created by combination of typed  and M-Modal dictation. Transcription and phonetic errors may be present.  If there are any questions, please contact me.      Current Outpatient Medications:     blood sugar diagnostic Strp, To check BG 2 times daily, to use with insurance preferred meter, Disp: 200 each, Rfl: 3    celecoxib (CELEBREX) 200 MG capsule, Take 1 capsule (200 mg total) by mouth 2 (two) times daily., Disp: 30 capsule, Rfl: 0    dulaglutide (TRULICITY) 1.5 mg/0.5 mL pen injector, Inject 1.5 mg into the skin every 7 days., Disp: 4 pen, Rfl: 5    insulin glargine, TOUJEO, (TOUJEO SOLOSTAR U-300 INSULIN) 300 unit/mL (1.5 mL) InPn pen, Inject 24 units once daily, Disp: 6 Syringe, Rfl: 2    lancets Misc, To check BG 2 times daily, to use with insurance preferred meter, Disp: 200 each, Rfl: 3    metFORMIN (GLUCOPHAGE) 1000 MG tablet, Take 1 tablet by mouth twice daily, Disp: 180 tablet, Rfl: 2    multivitamin (THERAGRAN) per tablet, Take 1 tablet by mouth once daily., Disp: , Rfl:     pen needle, diabetic (BD ULTRA-FINE SHORT PEN NEEDLE) 31 gauge x 5/16" Ndle, USE  ONCE DAILY, Disp: 100 each, Rfl: 0    pravastatin (PRAVACHOL) 40 MG tablet, Take 1 tablet (40 mg total) by mouth once daily., Disp: 90 tablet, Rfl: 0    sildenafil (REVATIO) 20 mg Tab, 1-5 pills at a time per day prn, Disp: 90 tablet, Rfl: 1    tiZANidine (ZANAFLEX) 4 MG tablet, Take 1 tablet (4 mg total) by mouth every 6 (six) hours as needed (muscle spasm)., Disp: 30 tablet, Rfl: 0    TRUEPLUS LANCETS 33 gauge Misc, USE 1 TO CHECK GLUCOSE TWICE DAILY, Disp: , Rfl:     aspirin (ECOTRIN) 81 MG EC tablet, Take 1 tablet (81 mg total) by mouth once daily., Disp: , Rfl: 0    blood-glucose meter kit, To check BG 3 times daily, to use with insurance preferred meter, Disp: 1 each, Rfl: 0    " DULoxetine (CYMBALTA) 60 MG capsule, Take 1 capsule (60 mg total) by mouth once daily., Disp: 90 capsule, Rfl: 3    gabapentin (NEURONTIN) 300 MG capsule, Take 1 capsule (300 mg total) by mouth 3 (three) times daily., Disp: 90 capsule, Rfl: 11    glimepiride (AMARYL) 2 MG tablet, Take 1 tablet (2 mg total) by mouth before breakfast., Disp: 90 tablet, Rfl: 2    hydroCHLOROthiazide (HYDRODIURIL) 12.5 MG Tab, Take 1 tablet (12.5 mg total) by mouth once daily., Disp: 30 tablet, Rfl: 1    Past Medical History:   Diagnosis Date    Arthritis     Diabetes mellitus type II, uncontrolled 11/28/2012    Diabetes mellitus with neurological manifestations, uncontrolled 6/13/2014    ED (erectile dysfunction) 11/28/2012    Eye injury     os hit broken orbital bone     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    History of colonic polyps 8/17/2018    Hyperlipidemia 11/28/2012    Poor compliance 1/8/2014    Primary hypertension 10/9/2021       Active Problem List with Overview Notes    Diagnosis Date Noted    Primary hypertension 10/09/2021    Obesity (BMI 30.0-34.9) 10/09/2021    Acute pulmonary embolism witho right heart strain 10/08/2021    Type 2 diabetes mellitus with diabetic neuropathy, without long-term current use of insulin 10/08/2021    Hypokalemia 10/08/2021    Refractive error 02/04/2021    Polyneuropathy 09/19/2019    Colon cancer screening 09/03/2019    Diabetic polyneuropathy associated with type 2 diabetes mellitus 08/12/2019    History of colonic polyps 08/17/2018    Blood in stool 08/02/2016    Varicose veins of lower extremities with inflammation 01/14/2016    Overweight (BMI 25.0-29.9) 01/10/2016    Diabetes mellitus with neurological manifestations, uncontrolled 06/13/2014    Poor compliance 01/08/2014    HDL lipoprotein deficiency 01/08/2014    Medial epicondylitis 01/08/2014    ED (erectile dysfunction) 11/28/2012    Hyperlipidemia 11/28/2012    Diabetes mellitus type II, uncontrolled 11/28/2012       Past Surgical  History:   Procedure Laterality Date    COLONOSCOPY N/A 8/2/2016    Procedure: COLONOSCOPY;  Surgeon: Miguel Persaud MD;  Location: [facility] ENDO;  Service: Endoscopy;  Laterality: N/A;    COLONOSCOPY N/A 9/3/2019    Procedure: COLONOSCOPY;  Surgeon: Álvaro Carmichael MD;  Location: [facility] ENDO;  Service: Endoscopy;  Laterality: N/A;  confirmed appt- sp    LUMBAR PUNCTURE N/A 09/19/2019       Social History     Socioeconomic History    Marital status:    Tobacco Use    Smoking status: Former     Types: Cigars    Smokeless tobacco: Never   Substance and Sexual Activity    Alcohol use: Yes     Alcohol/week: 6.0 standard drinks     Types: 6 Cans of beer per week     Comment: per week    Drug use: No    Sexual activity: Not Currently     Social Determinants of Health     Financial Resource Strain: Low Risk     Difficulty of Paying Living Expenses: Not hard at all   Food Insecurity: No Food Insecurity    Worried About Running Out of Food in the Last Year: Never true    Ran Out of Food in the Last Year: Never true   Transportation Needs: No Transportation Needs    Lack of Transportation (Medical): No    Lack of Transportation (Non-Medical): No   Physical Activity: Sufficiently Active    Days of Exercise per Week: 5 days    Minutes of Exercise per Session: 150+ min   Stress: Stress Concern Present    Feeling of Stress : To some extent   Social Connections: Unknown    Frequency of Communication with Friends and Family: More than three times a week    Frequency of Social Gatherings with Friends and Family: Once a week    Active Member of Clubs or Organizations: Yes    Attends Club or Organization Meetings: More than 4 times per year    Marital Status:    Housing Stability: Low Risk     Unable to Pay for Housing in the Last Year: No    Number of Places Lived in the Last Year: 1    Unstable Housing in the Last Year: No

## 2023-03-24 ENCOUNTER — OFFICE VISIT (OUTPATIENT)
Dept: PAIN MEDICINE | Facility: CLINIC | Age: 58
End: 2023-03-24
Payer: COMMERCIAL

## 2023-03-24 ENCOUNTER — PATIENT MESSAGE (OUTPATIENT)
Dept: ADMINISTRATIVE | Facility: HOSPITAL | Age: 58
End: 2023-03-24
Payer: COMMERCIAL

## 2023-03-24 VITALS
DIASTOLIC BLOOD PRESSURE: 79 MMHG | BODY MASS INDEX: 27.93 KG/M2 | OXYGEN SATURATION: 99 % | SYSTOLIC BLOOD PRESSURE: 130 MMHG | WEIGHT: 235.5 LBS | HEART RATE: 85 BPM | RESPIRATION RATE: 18 BRPM

## 2023-03-24 DIAGNOSIS — G56.23 ULNAR NEUROPATHY OF BOTH UPPER EXTREMITIES: ICD-10-CM

## 2023-03-24 DIAGNOSIS — M47.812 CERVICAL SPONDYLOSIS: ICD-10-CM

## 2023-03-24 DIAGNOSIS — M54.12 CERVICAL RADICULOPATHY: ICD-10-CM

## 2023-03-24 DIAGNOSIS — M50.30 DDD (DEGENERATIVE DISC DISEASE), CERVICAL: Primary | ICD-10-CM

## 2023-03-24 PROCEDURE — 3046F PR MOST RECENT HEMOGLOBIN A1C LEVEL > 9.0%: ICD-10-PCS | Mod: CPTII,S$GLB,, | Performed by: PAIN MEDICINE

## 2023-03-24 PROCEDURE — 3008F BODY MASS INDEX DOCD: CPT | Mod: CPTII,S$GLB,, | Performed by: PAIN MEDICINE

## 2023-03-24 PROCEDURE — 99204 PR OFFICE/OUTPT VISIT, NEW, LEVL IV, 45-59 MIN: ICD-10-PCS | Mod: S$GLB,,, | Performed by: PAIN MEDICINE

## 2023-03-24 PROCEDURE — 99999 PR PBB SHADOW E&M-EST. PATIENT-LVL V: CPT | Mod: PBBFAC,,, | Performed by: PAIN MEDICINE

## 2023-03-24 PROCEDURE — 1159F MED LIST DOCD IN RCRD: CPT | Mod: CPTII,S$GLB,, | Performed by: PAIN MEDICINE

## 2023-03-24 PROCEDURE — 1160F PR REVIEW ALL MEDS BY PRESCRIBER/CLIN PHARMACIST DOCUMENTED: ICD-10-PCS | Mod: CPTII,S$GLB,, | Performed by: PAIN MEDICINE

## 2023-03-24 PROCEDURE — 3066F PR DOCUMENTATION OF TREATMENT FOR NEPHROPATHY: ICD-10-PCS | Mod: CPTII,S$GLB,, | Performed by: PAIN MEDICINE

## 2023-03-24 PROCEDURE — 1159F PR MEDICATION LIST DOCUMENTED IN MEDICAL RECORD: ICD-10-PCS | Mod: CPTII,S$GLB,, | Performed by: PAIN MEDICINE

## 2023-03-24 PROCEDURE — 3061F NEG MICROALBUMINURIA REV: CPT | Mod: CPTII,S$GLB,, | Performed by: PAIN MEDICINE

## 2023-03-24 PROCEDURE — 3078F DIAST BP <80 MM HG: CPT | Mod: CPTII,S$GLB,, | Performed by: PAIN MEDICINE

## 2023-03-24 PROCEDURE — 3075F SYST BP GE 130 - 139MM HG: CPT | Mod: CPTII,S$GLB,, | Performed by: PAIN MEDICINE

## 2023-03-24 PROCEDURE — 1160F RVW MEDS BY RX/DR IN RCRD: CPT | Mod: CPTII,S$GLB,, | Performed by: PAIN MEDICINE

## 2023-03-24 PROCEDURE — 3046F HEMOGLOBIN A1C LEVEL >9.0%: CPT | Mod: CPTII,S$GLB,, | Performed by: PAIN MEDICINE

## 2023-03-24 PROCEDURE — 99999 PR PBB SHADOW E&M-EST. PATIENT-LVL V: ICD-10-PCS | Mod: PBBFAC,,, | Performed by: PAIN MEDICINE

## 2023-03-24 PROCEDURE — 3008F PR BODY MASS INDEX (BMI) DOCUMENTED: ICD-10-PCS | Mod: CPTII,S$GLB,, | Performed by: PAIN MEDICINE

## 2023-03-24 PROCEDURE — 3061F PR NEG MICROALBUMINURIA RESULT DOCUMENTED/REVIEW: ICD-10-PCS | Mod: CPTII,S$GLB,, | Performed by: PAIN MEDICINE

## 2023-03-24 PROCEDURE — 3078F PR MOST RECENT DIASTOLIC BLOOD PRESSURE < 80 MM HG: ICD-10-PCS | Mod: CPTII,S$GLB,, | Performed by: PAIN MEDICINE

## 2023-03-24 PROCEDURE — 3066F NEPHROPATHY DOC TX: CPT | Mod: CPTII,S$GLB,, | Performed by: PAIN MEDICINE

## 2023-03-24 PROCEDURE — 99204 OFFICE O/P NEW MOD 45 MIN: CPT | Mod: S$GLB,,, | Performed by: PAIN MEDICINE

## 2023-03-24 PROCEDURE — 3075F PR MOST RECENT SYSTOLIC BLOOD PRESS GE 130-139MM HG: ICD-10-PCS | Mod: CPTII,S$GLB,, | Performed by: PAIN MEDICINE

## 2023-03-24 NOTE — PROGRESS NOTES
Subjective:     Patient ID: Abel Morris is a 57 y.o. male    Chief Complaint: Neck Pain (Left sided sharp radiating pain)      Referred by: Pam Cordero MD      HPI:    Initial Encounter (3/24/23):  Abel Morris is a 57 y.o. male who presents today with acute left-sided neck and upper extremity pain.  This pain is present for roughly 4 weeks.  No specific inciting events or injuries noted.  The pain is located the left lower cervical paraspinal region radiate to left upper extremity via the C7 dermatome.  He reports intermittent paresthesias when pain is severe involving this same area.  He denies any focal weakness or bowel bladder dysfunction.  Pain is constant and worsened with certain positions/activities.  Patient also noted to have wasting of the ulnar muscles of bilateral hands.  States this has been this way for least a couple of years.  Reports a history of having severe numbness of the lower extremities and was told that he had diabetic peripheral neuropathy.  States that the sensory abnormalities in lower extremities has resolved.  Does report some weakness when using the right hand.  Reports clumsiness with fine motor skills like buttoning shirts and using his keys.   This pain is described in detail below.    Physical Therapy:  No    Non-pharmacologic Treatment:  Rest helps         TENS?  No    Pain Medications:         Currently taking:  Celebrex, tramadol    Has tried in the past:  Gabapentin, Cymbalta, NSAIDs, Tylenol    Has not tried:  Muscle relaxants, TCAs, topical creams    Blood thinners:  None    Interventional Therapies:  None    Relevant Surgeries:  None    Affecting sleep?  Yes    Affecting daily activities? yes    Depressive symptoms? yes          SI/HI? No    Work status: Employed    Pain Scores:    Best:       7/10  Worst:     10/10  Usually:   8/10  Today:    8/10    Pain Disability Index  Family/Home Responsibilities:: 0  Recreation:: 0  Social Activity::  0  Occupation:: 8  Sexual Behavior:: 7  Self Care:: 0  Life-Support Activities:: 5  Pain Disability Index (PDI): 20    Review of Systems    Past Medical History:   Diagnosis Date    Arthritis     Diabetes mellitus type II, uncontrolled 11/28/2012    Diabetes mellitus with neurological manifestations, uncontrolled 6/13/2014    ED (erectile dysfunction) 11/28/2012    Eye injury     os hit broken orbital bone     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    History of colonic polyps 8/17/2018    Hyperlipidemia 11/28/2012    Poor compliance 1/8/2014    Primary hypertension 10/9/2021       Past Surgical History:   Procedure Laterality Date    COLONOSCOPY N/A 8/2/2016    Procedure: COLONOSCOPY;  Surgeon: Miguel Persaud MD;  Location: [facility] ENDO;  Service: Endoscopy;  Laterality: N/A;    COLONOSCOPY N/A 9/3/2019    Procedure: COLONOSCOPY;  Surgeon: Álvaro Carmichael MD;  Location: [facility] ENDO;  Service: Endoscopy;  Laterality: N/A;  confirmed appt- sp    LUMBAR PUNCTURE N/A 09/19/2019       Social History     Socioeconomic History    Marital status:    Tobacco Use    Smoking status: Former     Types: Cigars    Smokeless tobacco: Never   Substance and Sexual Activity    Alcohol use: Yes     Alcohol/week: 6.0 standard drinks     Types: 6 Cans of beer per week     Comment: per week    Drug use: No    Sexual activity: Not Currently     Social Determinants of Health     Financial Resource Strain: Low Risk     Difficulty of Paying Living Expenses: Not hard at all   Food Insecurity: No Food Insecurity    Worried About Running Out of Food in the Last Year: Never true    Ran Out of Food in the Last Year: Never true   Transportation Needs: No Transportation Needs    Lack of Transportation (Medical): No    Lack of Transportation (Non-Medical): No   Physical Activity: Sufficiently Active    Days of Exercise per Week: 5 days    Minutes of Exercise per Session: 150+ min   Stress: Stress Concern Present    Feeling of Stress : To some  "extent   Social Connections: Unknown    Frequency of Communication with Friends and Family: More than three times a week    Frequency of Social Gatherings with Friends and Family: Once a week    Active Member of Clubs or Organizations: Yes    Attends Club or Organization Meetings: More than 4 times per year    Marital Status:    Housing Stability: Low Risk     Unable to Pay for Housing in the Last Year: No    Number of Places Lived in the Last Year: 1    Unstable Housing in the Last Year: No       Review of patient's allergies indicates:  No Known Allergies    Current Outpatient Medications on File Prior to Visit   Medication Sig Dispense Refill    blood sugar diagnostic Strp To check BG 2 times daily, to use with insurance preferred meter 200 each 3    celecoxib (CELEBREX) 200 MG capsule Take 1 capsule (200 mg total) by mouth 2 (two) times daily. 30 capsule 0    dulaglutide (TRULICITY) 1.5 mg/0.5 mL pen injector Inject 1.5 mg into the skin every 7 days. 4 pen 5    insulin glargine, TOUJEO, (TOUJEO SOLOSTAR U-300 INSULIN) 300 unit/mL (1.5 mL) InPn pen Inject 24 units once daily 6 Syringe 2    lancets Misc To check BG 2 times daily, to use with insurance preferred meter 200 each 3    metFORMIN (GLUCOPHAGE) 1000 MG tablet Take 1 tablet by mouth twice daily 180 tablet 2    multivitamin (THERAGRAN) per tablet Take 1 tablet by mouth once daily.      pen needle, diabetic (BD ULTRA-FINE SHORT PEN NEEDLE) 31 gauge x 5/16" Ndle USE  ONCE DAILY 100 each 0    pravastatin (PRAVACHOL) 40 MG tablet Take 1 tablet (40 mg total) by mouth once daily. 90 tablet 0    sildenafil (REVATIO) 20 mg Tab 1-5 pills at a time per day prn 90 tablet 1    TRUEPLUS LANCETS 33 gauge Misc USE 1 TO CHECK GLUCOSE TWICE DAILY      aspirin (ECOTRIN) 81 MG EC tablet Take 1 tablet (81 mg total) by mouth once daily.  0    blood-glucose meter kit To check BG 3 times daily, to use with insurance preferred meter 1 each 0    glimepiride (AMARYL) 2 MG " tablet Take 1 tablet (2 mg total) by mouth before breakfast. 90 tablet 2    hydroCHLOROthiazide (HYDRODIURIL) 12.5 MG Tab Take 1 tablet (12.5 mg total) by mouth once daily. 30 tablet 1    [] traMADoL (ULTRAM) 50 mg tablet Take 1 tablet (50 mg total) by mouth every 8 (eight) hours as needed for Pain. 21 tablet 0    [DISCONTINUED] DULoxetine (CYMBALTA) 60 MG capsule Take 1 capsule (60 mg total) by mouth once daily. 90 capsule 3    [DISCONTINUED] gabapentin (NEURONTIN) 300 MG capsule Take 1 capsule (300 mg total) by mouth 3 (three) times daily. 90 capsule 11     No current facility-administered medications on file prior to visit.       Objective:      /79 (BP Location: Right arm, Patient Position: Sitting, BP Method: Medium (Automatic))   Pulse 85   Resp 18   Wt 106.8 kg (235 lb 8 oz)   SpO2 99%   BMI 27.93 kg/m²     Exam:  GEN:  Well developed, well nourished.  No acute distress.  Normal pain behavior.  HEENT:  No trauma.  Mucous membranes moist.  Nares patent bilaterally.  PSYCH: Normal affect. Thought content appropriate.  CHEST:  Breathing symmetric.  No audible wheezing.  ABD: Soft, non-distended.  SKIN:  Warm, pink, dry.  No rash on exposed areas.    EXT:  No cyanosis, clubbing, or edema.  No color change or changes in nail or hair growth.  NEURO/MUSCULOSKELETAL:  Fully alert, oriented, and appropriate. Speech normal bre. No cranial nerve deficits.   Gait:  Normal.  4-5 strength with left elbow extension and bilateral finger abduction.  Otherwise 5/5 strength throughout upper extremities.  Sensory:  No sensory deficit in the upper extremities.   Reflexes:  2 + and symmetric throughout.  Absent Hanson's bilaterally.  C-Spine:  Full ROM with pain on flexion more than extension.  Negative facet loading bilaterally.  Positive Spurling's on the left.    Positive TTP over left cervical paraspinals and left upper trapezius and rhomboids.      Imaging:      Narrative &  Impression    EXAMINATION:  MRI CERVICAL SPINE W WO CONTRAST     CLINICAL HISTORY:  active denervation on EMG, eval for radiculopathy;  Other symptoms and signs involving the musculoskeletal system     TECHNIQUE:  Multiplanar multisequence MRI of the cervical spine is performed.  Sagittal and axial fat saturated T1 weighted sequences also performed after patient was given intravenous gadolinium (10 cc Gadavist).     COMPARISON:  None     FINDINGS:  There is normal lordosis of the cervical spine.  Heterogeneous signal intensities within the marrow of the vertebral bodies, likely from osteopenia.  There is no subluxations.  Prevertebral soft tissues are within normal limits.  Craniovertebral alignment is within normal limits.  In the craniovertebral junction no Chiari type malformations.  The signal intensities within the cervical cord are within normal limits.  There is no abnormal enhancement within the cervical cord.     C2-3: There is mild circumferential annular disc bulge without spinal stenosis or cord compression.  Mild to moderate left foraminal stenosis related to an uncovertebral joint osteophyte and mild left facet arthropathy.  Right neural foramen is unremarkable.     C3-4: Mild posterior bulging of the annulus.  No cord compression or displacement of the cord.  Neural foramina are unremarkable.     C4-5: Mild broad-based posterior bulging of the annulus without cord compression or spinal stenosis.  There is no significant foraminal stenosis.     C5-6: No disc herniations or spinal stenosis or significant foraminal stenosis.     C6-C7: No disc herniations or spinal stenosis or foraminal stenosis.     C7-T1: No disc herniations or spinal stenosis or foraminal stenosis.     Incidental note of an extradural compression of the thecal sac ventrally at T2-T3 disc space from a disc bulge and osteophyte complex, without cord compression.     Impression:     1. Findings suggestive of osteopenia.  2. Mild  degenerative changes C2-3 C3-4 and C4-5.  3. Spondylotic changes T2-T3 disc space as above.  4. Rest of the examination is unremarkable.  There is no abnormal enhancement or significant abnormalities of the cervical cord.        Electronically signed by: Jj Hurtado MD  Date:                                            09/13/2019  Time:                                           09:51       Assessment:       Encounter Diagnoses   Name Primary?    Cervical radiculopathy     DDD (degenerative disc disease), cervical Yes    Cervical spondylosis     Ulnar neuropathy of both upper extremities          Plan:       Abel was seen today for neck pain.    Diagnoses and all orders for this visit:    DDD (degenerative disc disease), cervical  -     MRI Cervical Spine Without Contrast; Future  -     X-Ray Cervical Spine AP Lat with Flexion  Extension; Future    Cervical radiculopathy  -     Ambulatory referral/consult to Pain Clinic  -     MRI Cervical Spine Without Contrast; Future  -     X-Ray Cervical Spine AP Lat with Flexion  Extension; Future    Cervical spondylosis  -     MRI Cervical Spine Without Contrast; Future  -     X-Ray Cervical Spine AP Lat with Flexion  Extension; Future    Ulnar neuropathy of both upper extremities  -     EMG W/ ULTRASOUND AND NERVE CONDUCTION TEST 2 Extremities; Future        Abel Morris is a 57 y.o. male with acute left-sided neck and upper extremity pain.  Also with weakness of elbow extension.  No sensory abnormalities.  Concerning for left C7 radiculopathy.  Also having chronic wasting of the ulnar intrinsic muscles of the hands.  Having weakness with bilateral finger abduction.  Concerning for ulnar neuropathy.    Pertinent imaging studies reviewed by me. Imaging results were discussed with patient.  Cervical x-rays.  Cervical MRI to evaluate for radiculopathy.  Bilateral upper extremity EMG to evaluate for ulnar neuropathy.  Follow-up to be determined.  Given left upper  extremity weakness with elbow extension, we will have low threshold to refer to Neurosurgery if significant compressive pathology noted on MRI.  May also consider follow up with Orthopedic surgery if ulnar neuropathy noted.            This note was created by combination of typed  and M-Modal dictation. Transcription and phonetic errors may be present.  If there are any questions, please contact me.

## 2023-03-28 ENCOUNTER — OFFICE VISIT (OUTPATIENT)
Dept: PODIATRY | Facility: CLINIC | Age: 58
End: 2023-03-28
Payer: COMMERCIAL

## 2023-03-28 VITALS — HEIGHT: 77 IN | BODY MASS INDEX: 27.75 KG/M2 | WEIGHT: 235 LBS

## 2023-03-28 DIAGNOSIS — M20.5X2 HALLUX LIMITUS, ACQUIRED, LEFT: ICD-10-CM

## 2023-03-28 DIAGNOSIS — B35.3 TINEA PEDIS OF BOTH FEET: ICD-10-CM

## 2023-03-28 DIAGNOSIS — M20.5X1 HALLUX LIMITUS, ACQUIRED, RIGHT: ICD-10-CM

## 2023-03-28 DIAGNOSIS — E11.9 COMPREHENSIVE DIABETIC FOOT EXAMINATION, TYPE 2 DM, ENCOUNTER FOR: ICD-10-CM

## 2023-03-28 DIAGNOSIS — L60.1 ONYCHOLYSIS OF TOENAIL: ICD-10-CM

## 2023-03-28 DIAGNOSIS — M20.42 HAMMER TOES OF BOTH FEET: ICD-10-CM

## 2023-03-28 DIAGNOSIS — B35.1 ONYCHOMYCOSIS DUE TO DERMATOPHYTE: ICD-10-CM

## 2023-03-28 DIAGNOSIS — E11.49 TYPE II DIABETES MELLITUS WITH NEUROLOGICAL MANIFESTATIONS: Primary | ICD-10-CM

## 2023-03-28 DIAGNOSIS — M20.41 HAMMER TOES OF BOTH FEET: ICD-10-CM

## 2023-03-28 PROCEDURE — 1160F PR REVIEW ALL MEDS BY PRESCRIBER/CLIN PHARMACIST DOCUMENTED: ICD-10-PCS | Mod: CPTII,S$GLB,, | Performed by: PODIATRIST

## 2023-03-28 PROCEDURE — 3046F HEMOGLOBIN A1C LEVEL >9.0%: CPT | Mod: CPTII,S$GLB,, | Performed by: PODIATRIST

## 2023-03-28 PROCEDURE — 3061F PR NEG MICROALBUMINURIA RESULT DOCUMENTED/REVIEW: ICD-10-PCS | Mod: CPTII,S$GLB,, | Performed by: PODIATRIST

## 2023-03-28 PROCEDURE — 99999 PR PBB SHADOW E&M-EST. PATIENT-LVL IV: CPT | Mod: PBBFAC,,, | Performed by: PODIATRIST

## 2023-03-28 PROCEDURE — 1160F RVW MEDS BY RX/DR IN RCRD: CPT | Mod: CPTII,S$GLB,, | Performed by: PODIATRIST

## 2023-03-28 PROCEDURE — 99999 PR PBB SHADOW E&M-EST. PATIENT-LVL IV: ICD-10-PCS | Mod: PBBFAC,,, | Performed by: PODIATRIST

## 2023-03-28 PROCEDURE — 99213 PR OFFICE/OUTPT VISIT, EST, LEVL III, 20-29 MIN: ICD-10-PCS | Mod: S$GLB,,, | Performed by: PODIATRIST

## 2023-03-28 PROCEDURE — 3008F BODY MASS INDEX DOCD: CPT | Mod: CPTII,S$GLB,, | Performed by: PODIATRIST

## 2023-03-28 PROCEDURE — 3066F PR DOCUMENTATION OF TREATMENT FOR NEPHROPATHY: ICD-10-PCS | Mod: CPTII,S$GLB,, | Performed by: PODIATRIST

## 2023-03-28 PROCEDURE — 3061F NEG MICROALBUMINURIA REV: CPT | Mod: CPTII,S$GLB,, | Performed by: PODIATRIST

## 2023-03-28 PROCEDURE — 1159F MED LIST DOCD IN RCRD: CPT | Mod: CPTII,S$GLB,, | Performed by: PODIATRIST

## 2023-03-28 PROCEDURE — 3066F NEPHROPATHY DOC TX: CPT | Mod: CPTII,S$GLB,, | Performed by: PODIATRIST

## 2023-03-28 PROCEDURE — 3046F PR MOST RECENT HEMOGLOBIN A1C LEVEL > 9.0%: ICD-10-PCS | Mod: CPTII,S$GLB,, | Performed by: PODIATRIST

## 2023-03-28 PROCEDURE — 3008F PR BODY MASS INDEX (BMI) DOCUMENTED: ICD-10-PCS | Mod: CPTII,S$GLB,, | Performed by: PODIATRIST

## 2023-03-28 PROCEDURE — 99213 OFFICE O/P EST LOW 20 MIN: CPT | Mod: S$GLB,,, | Performed by: PODIATRIST

## 2023-03-28 PROCEDURE — 1159F PR MEDICATION LIST DOCUMENTED IN MEDICAL RECORD: ICD-10-PCS | Mod: CPTII,S$GLB,, | Performed by: PODIATRIST

## 2023-03-28 NOTE — PROGRESS NOTES
Subjective:      Patient ID: Abel Morris is a 57 y.o. male.    Chief Complaint: Diabetes Mellitus (Marga Keith MD at 3/7/2023  ), Diabetic Foot Exam, and Nail Problem (Fungus)    Abel Morris is a 57 y.o. male with  has a past medical history of Arthritis, Diabetes mellitus type II, uncontrolled, Diabetes mellitus with neurological manifestations, uncontrolled, ED (erectile dysfunction), Eye injury, HDL lipoprotein deficiency, History of colonic polyps, Hyperlipidemia, Poor compliance, and Primary hypertension. presents to the podiatry clinic for care of  right foot ulcer.   Location: toes Onset of the symptoms was several weeks ago. Precipitating event: none known.  History of injury: no Current symptoms include: redness and swelling. Signs of infection denies nausea, vomiting, fever, chills   Symptoms have progressed to a point and plateaued. Patient has had no prior foot problems. Evaluation to date:  seen in ED . Treatment to date: Cleocin. Patients rates pain 0/10 on pain scale.    02/17/2021 Abel Morris is a 57 y.o. male returns to clinic for follow up of  right foot ulcers.  Patient has left football dressing clean, dry, intact.  Patient denies pain. No new pedal complaints.    02/24/2021   returns to clinic for follow up of  right foot ulcers.  Patient has left football dressing clean, dry, intact.  Patient denies pain. No new pedal complaints.    03/03/2021 returns to clinic for follow up of  right foot ulcers.  Patient has left football dressing clean, dry, intact.  Patient denies pain. No new pedal complaints. Admits to walking without darco shoe in the home    03/31/2021  returns to clinic for follow up of  right foot ulcers. Relates he has been caring for foot as instructed and it appears to have remained healed.  He is still off work and awaiting a vascular appointment.  He relates there was a miscommunication on his [facility] paperwork. Presents in [facility]    07/21/2021  The patient's chief complaint is elongated, thickened toenails aggravated by increased weight bearing, shoe gear, pressure.    03/28/2023  patient relates that he went trim toenail of right hallux yesterday and entire nail came off while trimming.  No drainage, no pain.  Denies trauma.     Chief Complaint   Patient presents with    Diabetes Mellitus     Marga Keith MD at 3/7/2023      Diabetic Foot Exam    Nail Problem     Fungus       Hemoglobin A1C   Date Value Ref Range Status   03/03/2023 9.4 (H) 4.0 - 5.6 % Final     Comment:     ADA Screening Guidelines:  5.7-6.4%  Consistent with prediabetes  >or=6.5%  Consistent with diabetes    High levels of fetal hemoglobin interfere with the HbA1C  assay. Heterozygous hemoglobin variants (HbS, HgC, etc)do  not significantly interfere with this assay.   However, presence of multiple variants may affect accuracy.     10/09/2021 6.8 (H) 4.0 - 5.6 % Final     Comment:     ADA Screening Guidelines:  5.7-6.4%  Consistent with prediabetes  >or=6.5%  Consistent with diabetes    High levels of fetal hemoglobin interfere with the HbA1C  assay. Heterozygous hemoglobin variants (HbS, HgC, etc)do  not significantly interfere with this assay.   However, presence of multiple variants may affect accuracy.     07/29/2021 6.2 (H) 4.0 - 5.6 % Final     Comment:     ADA Screening Guidelines:  5.7-6.4%  Consistent with prediabetes  >or=6.5%  Consistent with diabetes    High levels of fetal hemoglobin interfere with the HbA1C  assay. Heterozygous hemoglobin variants (HbS, HgC, etc)do  not significantly interfere with this assay.   However, presence of multiple variants may affect accuracy.             Patient Active Problem List   Diagnosis    ED (erectile dysfunction)    Hyperlipidemia    Diabetes mellitus type II, uncontrolled    Poor compliance    HDL lipoprotein deficiency    Medial epicondylitis    Diabetes mellitus with neurological manifestations, uncontrolled    Overweight (BMI  "25.0-29.9)    Varicose veins of lower extremities with inflammation    Blood in stool    History of colonic polyps    Diabetic polyneuropathy associated with type 2 diabetes mellitus    Colon cancer screening    Polyneuropathy    Refractive error    Acute pulmonary embolism witho right heart strain    Type 2 diabetes mellitus with diabetic neuropathy, without long-term current use of insulin    Hypokalemia    Primary hypertension    Obesity (BMI 30.0-34.9)    Cervical spondylosis    DDD (degenerative disc disease), cervical    Cervical radiculopathy    Long-term insulin use    History of pulmonary embolus (PE)       Current Outpatient Medications on File Prior to Visit   Medication Sig Dispense Refill    blood sugar diagnostic Strp To check BG 2 times daily, to use with insurance preferred meter 200 each 3    celecoxib (CELEBREX) 200 MG capsule Take 1 capsule (200 mg total) by mouth 2 (two) times daily. 30 capsule 0    lancets Misc To check BG 2 times daily, to use with insurance preferred meter 200 each 3    metFORMIN (GLUCOPHAGE) 1000 MG tablet Take 1 tablet by mouth twice daily 180 tablet 2    multivitamin (THERAGRAN) per tablet Take 1 tablet by mouth once daily.      pen needle, diabetic (BD ULTRA-FINE SHORT PEN NEEDLE) 31 gauge x 5/16" Ndle USE  ONCE DAILY 100 each 0    sildenafil (REVATIO) 20 mg Tab 1-5 pills at a time per day prn 90 tablet 1    TRUEPLUS LANCETS 33 gauge Misc USE 1 TO CHECK GLUCOSE TWICE DAILY      aspirin (ECOTRIN) 81 MG EC tablet Take 1 tablet (81 mg total) by mouth once daily.  0    blood-glucose meter kit To check BG 3 times daily, to use with insurance preferred meter 1 each 0     No current facility-administered medications on file prior to visit.       Review of patient's allergies indicates:  No Known Allergies    Past Surgical History:   Procedure Laterality Date    COLONOSCOPY N/A 8/2/2016    Procedure: COLONOSCOPY;  Surgeon: Miguel Persaud MD;  Location: [facility];  Service: " Endoscopy;  Laterality: N/A;    COLONOSCOPY N/A 9/3/2019    Procedure: COLONOSCOPY;  Surgeon: Álvaro Carmichael MD;  Location: [facility];  Service: Endoscopy;  Laterality: N/A;  confirmed appt- sp    LUMBAR PUNCTURE N/A 09/19/2019       Family History   Problem Relation Age of Onset    No Known Problems Mother     Colon polyps Father 61    No Known Problems Sister     No Known Problems Brother     No Known Problems Maternal Aunt     No Known Problems Maternal Uncle     No Known Problems Paternal Aunt     No Known Problems Paternal Uncle     Diabetes Maternal Grandmother     No Known Problems Maternal Grandfather     No Known Problems Paternal Grandmother     No Known Problems Paternal Grandfather     Amblyopia Neg Hx     Blindness Neg Hx     Cancer Neg Hx     Cataracts Neg Hx     Glaucoma Neg Hx     Hypertension Neg Hx     Macular degeneration Neg Hx     Retinal detachment Neg Hx     Strabismus Neg Hx     Stroke Neg Hx     Thyroid disease Neg Hx        Social History     Socioeconomic History    Marital status:    Tobacco Use    Smoking status: Former     Types: Cigars    Smokeless tobacco: Never   Substance and Sexual Activity    Alcohol use: Yes     Alcohol/week: 6.0 standard drinks     Types: 6 Cans of beer per week     Comment: per week    Drug use: No    Sexual activity: Not Currently     Social Determinants of Health     Financial Resource Strain: Medium Risk    Difficulty of Paying Living Expenses: Somewhat hard   Food Insecurity: No Food Insecurity    Worried About Running Out of Food in the Last Year: Never true    Ran Out of Food in the Last Year: Never true   Transportation Needs: No Transportation Needs    Lack of Transportation (Medical): No    Lack of Transportation (Non-Medical): No   Physical Activity: Inactive    Days of Exercise per Week: 0 days    Minutes of Exercise per Session: 150+ min   Stress: No Stress Concern Present    Feeling of Stress : Not at all   Social Connections: Unknown     "Frequency of Communication with Friends and Family: More than three times a week    Frequency of Social Gatherings with Friends and Family: Once a week    Active Member of Clubs or Organizations: No    Attends Club or Organization Meetings: Never    Marital Status:    Housing Stability: High Risk    Unable to Pay for Housing in the Last Year: Yes    Number of Places Lived in the Last Year: 1    Unstable Housing in the Last Year: No       Review of Systems   Constitutional: Negative for chills and fever.   Cardiovascular:  Positive for leg swelling. Negative for chest pain and claudication.   Respiratory:  Negative for cough and shortness of breath.    Skin:  Positive for dry skin, nail changes and suspicious lesions. Negative for itching and rash.   Musculoskeletal:  Positive for joint pain, muscle cramps (nocturnal) and myalgias. Negative for back pain, falls, joint swelling and muscle weakness.   Gastrointestinal:  Negative for diarrhea, nausea and vomiting.   Neurological:  Positive for numbness and paresthesias. Negative for tremors and weakness.   Psychiatric/Behavioral:  Negative for altered mental status and hallucinations.          Objective:      Vitals:    03/28/23 0954   Weight: 106.6 kg (235 lb)   Height: 6' 5" (1.956 m)   PainSc: 0-No pain       Physical Exam  Vitals and nursing note reviewed.   Constitutional:       General: He is not in acute distress.     Appearance: He is well-developed. He is not toxic-appearing or diaphoretic.      Comments: alert and oriented x 3.    Cardiovascular:      Pulses:           Dorsalis pedis pulses are 1+ on the right side and 1+ on the left side.        Posterior tibial pulses are 2+ on the right side and 2+ on the left side.      Comments: There is decreased digital hair. Skin is atrophic, slightly hyperpigmented  Pulmonary:      Effort: No respiratory distress.   Musculoskeletal:         General: No deformity.      Right lower leg: Edema present.      Left " lower leg: Edema present.      Right ankle: No tenderness. No lateral malleolus, medial malleolus, AITF ligament, CF ligament or posterior TF ligament tenderness.      Right Achilles Tendon: No defects. Morris's test negative.      Left ankle: No tenderness. No lateral malleolus, medial malleolus, AITF ligament, CF ligament or posterior TF ligament tenderness.      Left Achilles Tendon: No defects. Morris's test negative.      Right foot: No tenderness or bony tenderness.      Left foot: No tenderness or bony tenderness.      Comments: Decreased first MPJ range of motion both weightbearing and nonweightbearing, no crepitus observed the first MP joint, + dorsal flag sign. Mild  bunion deformity is observed .    Patient has hammertoes of digits 2-5 bilateral partially reducible     Fat pad atrophy to heels and met heads bilateral    Muscle strength is 5/5 in all groups bilaterally.           Feet:      Right foot:      Protective Sensation: 10 sites tested.  5 sites sensed.      Left foot:      Protective Sensation: 10 sites tested.  3 sites sensed.      Skin integrity: Ulcer present.   Lymphadenopathy:      Comments: No lymphatic streaking     Skin:     General: Skin is warm and dry.      Coloration: Skin is not pale.      Findings: No rash.      Nails: There is no clubbing.      Comments: Onycholysis right hallux nail, bleeding noted    Toenails 1-5 bilaterally are elongated by 2-3 mm, thickened by 2-3 mm, discolored/yellowed, dystrophic, brittle with subungual debris.    Scaling dryness in a moccasin distribution is noted to the bilateral lower extremities with associated erythema.     Neurological:      Sensory: Sensory deficit present.      Motor: No atrophy.      Comments: Decreased/absent vibratory sensation bilateral feet to 128Hz tuning fork.     Psychiatric:         Attention and Perception: He is attentive.         Mood and Affect: Mood is not anxious. Affect is not inappropriate.         Speech: He is  communicative. Speech is not slurred.         Behavior: Behavior is not combative.       03/28/2023       Assessment:       Encounter Diagnoses   Name Primary?    Comprehensive diabetic foot examination, type 2 DM, encounter for     Onychomycosis due to dermatophyte     Tinea pedis of both feet     Onycholysis of toenail     Hammer toes of both feet     Hallux limitus, acquired, right     Hallux limitus, acquired, left     Type II diabetes mellitus with neurological manifestations Yes           Plan:     Problem List Items Addressed This Visit    None  Visit Diagnoses       Type II diabetes mellitus with neurological manifestations    -  Primary    Comprehensive diabetic foot examination, type 2 DM, encounter for        Onychomycosis due to dermatophyte        Tinea pedis of both feet        Onycholysis of toenail        Hammer toes of both feet        Hallux limitus, acquired, right        Hallux limitus, acquired, left                      I counseled the patient on his conditions, their implications and medical management.    Education about the diabetic foot, neuropathy, and prevention of limb loss.    Shoe inspection. Diabetic Foot Education. Patient reminded of the importance of good nutrition/healthy diet/weight management and blood sugar control to help prevent podiatric complications of diabetes. Patient instructed on proper foot hygeine. Wear comfortable, proper fitting shoes. Wash feet daily. Dry well. After drying, apply moisturizer to feet (no lotion to webspaces). Inspect feet daily for skin breaks, blisters, swelling, or redness. Wear cotton socks (preferably white)  Change socks every day. Do NOT walk barefoot. Do NOT use heating pads or hot water soaks. We discussed wearing proper shoe gear, daily foot inspections, never walking without protective shoe gear.     Discussed edema control and the importance of daily moisturizer to the feet such as Gold bonds diabetic foot cream    Recommend applying  vicks vaporub to thick abnormal toenails daily x 6 months to treat fungal nail infection.    In depth conversation on the treatment of lysing nail; nail avulsion vs conservative treatment of soaking and nail trimming. She will treat conservatively.    Informed patient that many nail problems can be prevented by wearing the right shoes and trimming your nails properly.   The right shoes: Feet were measured.  Patient is to wear shoes that are supportive and roomy enough for toes to wiggle. Look for shoes made of natural materials such as leather, which allow  feet to breathe.   Proper trimming: To avoid problems, she was instructed to trim toenails straight across without cutting down into the corners.     Assured patient that nail may separate and fall off in the next two weeks. In almost all cases, the nail will grow back from under the cuticle. This takes a few weeks to start and is complete in about 4-6 months. If the nail bed was damaged, the nail may grow back with a rough or irregular shape. Sometimes the nail may not regrow at all.    Instructed patient on the importance of keeping feet dry. Patient instructed to use absorbent cotton socks and change them if they become sweaty; or wear an open-toe shoe or sandal. Wash the feet at least once a day with soap and water. Apply the antifungal as discussed. Instructed patient that it takes time for symptoms to completely dissipate. Patient instructed to use lysol or over-the-counter antifungal powders or sprays to shoes daily and allow them to air dry, switching shoes from every other day would be optimal. Patient is to avoid barefoot walking in  high-risk environments (public showers, gyms and locker rooms) may prevent future infections.     Patient to RTC if:  Increasing redness or swelling of the foot   Pus draining from cracks in the skin   Fever of 100.4ºF (38ºC) or higher    He will continue to monitor the areas daily, inspect his feet, wear protective shoe  gear when ambulatory, moisturizer to maintain skin integrity and follow in this office in approximately 2-5 months, sooner p.r.n.

## 2023-03-28 NOTE — PATIENT INSTRUCTIONS
Athletes Foot     Athletes Foot is caused by a fungal infection in the skin. It affects the skin between the toes where it causes fissures (cracks in the skin). It can also affect the bottom of the foot where it causes dry white scales and peeling of the skin. This infection is more likely to occur when the foot is in hot, sweaty socks and shoes for long periods of time.   This infection is treated with skin creams or oral medicine.     Home Care:   It is important to keep the feet dry. Use absorbent cotton socks and change them if they become sweaty; or wear an open-toe shoe or sandal. Wash the feet at least once a day with Nizoral shampoo or dial antibacterial soap and water then dry completely. Avoid soaking and prolonged exposure to water  Rotate your shoes. If you must wear the same shoes everyday then spray the shoes with lysol or antifungal spray and allow that to dry overnight before wearing the shoes again  Apply the antifungal cream as prescribed. Some antifungal creams are available without a prescription (Lotrimin, Tinactin).   It may take 2 weeks before the rash starts to improve and it can take about three to ten weeks to completely clear. Continue the medicine until the rash is all gone.   Use over-the-counter antifungal powders or sprays on your feet after exposure to high-risk environments (public showers, gyms and locker rooms) may prevent future infections. You may wish to use appropriate footwear to reduce exposure.  Clean tubs and bathroom floor with bleach    Follow Up   with your doctor as recommended by our staff if the rash is not starting to improve after TEN days of treatment, or if the rash continues to spread.     Get Prompt Medical Attention   if any of the following occur:   Increasing redness or swelling of the foot   Pus draining from cracks in the skin   Fever of 100.4ºF (38ºC) or higher, or as directed by your healthcare provider    © 9576-8704 [employer], [address] [place] [address]  [place] [address]. All rights reserved. This information is not intended as a substitute for professional medical care. Always follow your healthcare professional's instructions.               Understanding Thickened Nails    There are several causes of very thick or crumbling nails. They can be caused by injuries or pressure from shoes. Fungal infections are a common cause. Diabetes, psoriasis, or vascular disease are other possible causes.  Symptoms  Along with thickening, the nail may appear ridged, brittle, or yellowish. It may also feel painful when pressure is put on it. After a while, a thickened nail may loosen and fall off.  Evaluation  Because thickened nails may be a symptom of a medical condition, your healthcare provider will look at your medical history. A test may be done to check for fungal infection. The thickness and color of the nail are examined carefully. This helps determine the cause.  Treatment  For infection: Oral or topical antifungal medicines may be used to treat infection. These can help prevent sores under the nail. They also keep the fungus from spreading to other nails.  For thick nails not caused by infection: Thinning the nail may be an option. This can be done by trimming, filing, or grinding.  For pain: If the nail causes pain, part or all of the nail can be removed with surgery. Never try to remove a nail by yourself.  Prevention  Many nail problems can be prevented by wearing the right shoes and trimming your nails properly. Keep your feet clean and dry to help avoid infection. If you have diabetes, talk with your healthcare provider before doing any foot self-care.  The right shoes: Get your feet measured. Your size may change as you age. This is due to ligaments that loosen with age and allow the bones in your foot to change position or spread. Wear shoes that are supportive and roomy enough for your toes to wiggle. Look for shoes made of natural materials, such as  leather, which allow your feet to breathe.  Proper trimming: To avoid problems, trim your toenails straight across. Then file the edges with a nail file. If you cant trim your own nails, ask your healthcare provider to do so for you.        Wearing Proper Shoes                    You walk on your feet every day, forcing them to support the weight of your body. Repeated stress on your feet can cause damage over time. The right shoes can help protect your feet. The wrong shoes can cause more foot problems. Read the information below to help you find a shoe that fits your foot needs.     A good shoe fit will cover your foot outline.       A shoe that doesnt cover the outline is a bad fit.      Whats Your Foot Shape?  To get a good fit, you need to know the shape of your foot. Do this simple test: While standing, place your foot on a piece of paper and trace around it. Is your foot straight or curved? Do you have a foot problem, such as a bunion, that causes your foot outline to show a bulge on the side of your big toe?  Finding Your Fit  Bring your foot outline to the shore store to help you find the right shoe. Place a shoe you like on top of the outline to see if it matches the shape. The shoe should cover the outline. (If you have a bunion, the shoe may not cover the bulge on the outline. Look for soft leather shoes to stretch over the bunion.) Once youve found a pair of proper shoes, put them on. Walk around. Be sure the shoes dont rub or pinch. If the shoes feel good, youve found your fit!  The Right Shoe for You  A good shoe has features that provide comfort and support. It must also be the right size and shape for your feet. Look for a shoe made of breathable fabric and lining, such as leather or canvas. Be sure that shoes have enough tread to prevent slipping. Go to a good shoe store for help finding the right shoe.  Good Shoe Features  An ideal shoe has the following:  Laces for support. If tying laces  is a problem for you, try shoes with Velcro fasteners or kurtis.  A front of the shoe (toe box) with ½ inch space in front of your longest toes.  An arch shape that supports your foot.  No more than 1½ inches of heel.  A stiff, snug back of the shoe to keep your foot from sliding around.  A smooth lining with no rough seams.  Shoe Shopping Tips  Below are some dos and donts for when you go to the shoe store.  Do:  Select the shoes that feel right. Wear them around the house. Then bring them to your foot doctor to check for fit. If they dont fit well, return them.  Shop late in the day, when your feet will be slightly bigger.  Each time you buy shoes, have both your feet measured while you are standing. Foot size changes with time.  Pick shoes to suit their purpose. High heels are okay for an occasional night on the town. But for everyday wear, choose a more sensible shoe.  Try on shoes while wearing any inserts specially made for your feet (orthoses).  Try on both the right and left shoes. If your feet are different sizes, pick a pair that fits the larger foot.  Dont:  Dont buy shoes based on shoe size alone. Always try on shoes, as sizes differ from brand to brand and within brands.  Dont expect shoes to break in. If they dont fit at the store, dont buy them.  Dont buy a shoe that doesnt match your foot shape.  What About Socks?  Always wear socks with shoes. Socks help absorb sweat and reduce friction and blistering. When shopping for shoes, choose soft, padded socks with seams that dont irritate your feet.  If You Have Foot Problems  Some foot problems cause deformities. This can make it hard to find a good fit. Look for shoes made of soft leather to stretch over the deformity. If you have bunions, buy shoes with a wider toe box. To fit hammertoes, look for shoes with a tall toe box. If you have arch problems, you may need inserts. In some cases, youll need to have custom footwear or orthoses made  for your feet.  Suggested Footwear  Ask your healthcare provider what kind of footwear you need. He or she may recommend a certain brand or shoe store.  © 0073-7277 [employer]. [address] [place] [address], [place] [address]. All rights reserved. This information is not intended as a substitute for professional medical care. Always follow your healthcare professional's instructions.    Over the counter pain creams: Voltaren Gel, Biofreeze, Bengay, tiger balm, two old goat, lidocaine gel,  Absorbine Veterinary Liniment Gel Topical Analgesic Sore Muscle and Joint Pain Relief    Recommend lotions: eucerin, eucerin for diabetics, aquaphor, A&D ointment, gold bond for diabetics, sween, Garrett's Bees all purpose baby ointment,  urea 40 with aloe (found on amazon.com)    Shoe recommendations: (try 6pm.[employer], zappos.Splendid Lab , nordstromrackFantasyBook, or shoes.Splend[employer] for discounted prices) you can visit [employer]SW shoes in [place]  or [facility] in the [facility] (there are also several shoe brand outlets in the [facility])    Asics (GT 2000 or gel foundations), new balance stability type shoes (such as the 940 series), saucony (stabil c3),  Bejarano (GTS or Beast or transcend), propet (tennis shoe)    Sofft Brand (women) Meredith&Hilton (men), clarks, crocs, aerosoles, naturalizers, SAS, ecco, born, janae spencer, ko (dress shoes)    Vionic, burkenstocks, fitflops, propet (sandals)      HokaOne sandals, crocs, propet (house shoes)    Nail Home remedy:  Vicks Vapor rub or Emuaid to nails for easier manageability    Diabetes: Inspecting Your Feet  Diabetes increases your chances of developing foot problems. So inspect your feet every day. This helps you find small skin irritations before they become serious infections. If you have trouble seeing the bottoms of your feet, use a mirror or ask a family member or friend to help.     Pressure spots on the bottom of the foot are common areas where problems develop.   How to check your  feet  Below are tips to help you look for foot problems. Try to check your feet at the same time each day, such as when you get out of bed in the morning:  Check the top of each foot. The tops of toes, back of the heel, and outer edge of the foot can get a lot of rubbing from poor-fitting shoes.  Check the bottom of each foot. Daily wear and tear often leads to problems at pressure spots.  Check the toes and nails. Fungal infections often occur between toes. Toenail problems can also be a sign of fungal infections or lead to breaks in the skin.  Check your shoes, too. Loose objects inside a shoe can injure the foot. Use your hand to feel inside your shoes for things like audra, loose stitching, or rough areas that could irritate your skin.  Warning signs  Look for any color changes in the foot. Redness with streaks can signal a severe infection, which needs immediate medical attention. Tell your doctor right away if you have any of these problems:  Swelling, sometimes with color changes, may be a sign of poor blood flow or infection. Symptoms include tenderness and an increase in the size of your foot.  Warm or hot areas on your feet may be signs of infection. A foot that is cold may not be getting enough blood.  Sensations such as burning, tingling, or pins and needles can be signs of a problem. Also check for areas that may be numb.  Hot spots are caused by friction or pressure. Look for hot spots in areas that get a lot of rubbing. Hot spots can turn into blisters, calluses, or sores.  Cracks and sores are caused by dry or irritated skin. They are a sign that the skin is breaking down, which can lead to infection.  Toenail problems to watch for include nails growing into the skin (ingrown toenail) and causing redness or pain. Thick, yellow, or discolored nails can signal a fungal infection.  Drainage and odor can develop from untreated sores and ulcers. Call your doctor right away if you notice white or  yellow drainage, bleeding, or unpleasant odor.   © 8520-6274 [employer]. [address] [place] [address]. All rights reserved. This information is not intended as a substitute for professional medical care. Always follow your healthcare professional's instructions.        Step-by-Step:  Inspecting Your Feet (Diabetes)    Date Last Reviewed: 10/1/2016  © 4340-3070 [employer]. [address] [place] [address]. All rights reserved. This information is not intended as a substitute for professional medical care. Always follow your healthcare professional's instructions.

## 2023-03-29 ENCOUNTER — TELEPHONE (OUTPATIENT)
Dept: NEUROLOGY | Facility: CLINIC | Age: 58
End: 2023-03-29
Payer: COMMERCIAL

## 2023-03-29 NOTE — TELEPHONE ENCOUNTER
----- Message from Samantha Gifford RN sent at 3/24/2023  8:04 AM CDT -----  Regarding: Bilat Upper EXT EMG  GM,     Please contact pt to schedule bilat upper ext EMG- order placed by Dr. Mary.     Happy Friday!  Samantha      Left message to call office

## 2023-03-31 ENCOUNTER — PATIENT MESSAGE (OUTPATIENT)
Dept: ORTHOPEDICS | Facility: CLINIC | Age: 58
End: 2023-03-31
Payer: COMMERCIAL

## 2023-04-03 ENCOUNTER — HOSPITAL ENCOUNTER (OUTPATIENT)
Dept: RADIOLOGY | Facility: HOSPITAL | Age: 58
Discharge: HOME | End: 2023-04-03
Attending: PAIN MEDICINE
Payer: COMMERCIAL

## 2023-04-03 DIAGNOSIS — M47.812 CERVICAL SPONDYLOSIS: ICD-10-CM

## 2023-04-03 DIAGNOSIS — M54.12 CERVICAL RADICULOPATHY: ICD-10-CM

## 2023-04-03 DIAGNOSIS — M50.30 DDD (DEGENERATIVE DISC DISEASE), CERVICAL: ICD-10-CM

## 2023-04-03 PROCEDURE — 72141 MRI NECK SPINE W/O DYE: CPT | Mod: TC

## 2023-04-03 PROCEDURE — 72141 MRI CERVICAL SPINE WITHOUT CONTRAST: ICD-10-PCS | Mod: 26,,, | Performed by: RADIOLOGY

## 2023-04-03 PROCEDURE — 72141 MRI NECK SPINE W/O DYE: CPT | Mod: 26,,, | Performed by: RADIOLOGY

## 2023-04-04 ENCOUNTER — TELEPHONE (OUTPATIENT)
Dept: PAIN MEDICINE | Facility: CLINIC | Age: 58
End: 2023-04-04
Payer: COMMERCIAL

## 2023-04-04 DIAGNOSIS — M47.812 CERVICAL SPONDYLOSIS: Primary | ICD-10-CM

## 2023-04-04 DIAGNOSIS — M50.30 DDD (DEGENERATIVE DISC DISEASE), CERVICAL: ICD-10-CM

## 2023-04-04 DIAGNOSIS — M54.12 CERVICAL RADICULOPATHY: ICD-10-CM

## 2023-04-04 NOTE — TELEPHONE ENCOUNTER
----- Message from Malcolm Mary Jr., MD sent at 4/4/2023 11:02 AM CDT -----  Please let patient know that his MRI showed a left sided disc herniation that is likely causing his pain/weakness. I discussed with Meme Ricks and we agreed that she should evaluate him. She has an opening on Thursday at 9AM. Can you please assist in scheduling? Thanks.

## 2023-04-05 ENCOUNTER — TELEPHONE (OUTPATIENT)
Dept: NEUROSURGERY | Facility: CLINIC | Age: 58
End: 2023-04-05
Payer: COMMERCIAL

## 2023-04-06 ENCOUNTER — OFFICE VISIT (OUTPATIENT)
Dept: ORTHOPEDICS | Facility: CLINIC | Age: 58
End: 2023-04-06
Payer: COMMERCIAL

## 2023-04-06 ENCOUNTER — OFFICE VISIT (OUTPATIENT)
Dept: NEUROSURGERY | Facility: CLINIC | Age: 58
End: 2023-04-06
Payer: COMMERCIAL

## 2023-04-06 ENCOUNTER — PATIENT MESSAGE (OUTPATIENT)
Dept: NEUROSURGERY | Facility: CLINIC | Age: 58
End: 2023-04-06

## 2023-04-06 VITALS
HEIGHT: 77 IN | SYSTOLIC BLOOD PRESSURE: 117 MMHG | OXYGEN SATURATION: 95 % | WEIGHT: 228.19 LBS | BODY MASS INDEX: 26.94 KG/M2 | HEART RATE: 91 BPM | DIASTOLIC BLOOD PRESSURE: 70 MMHG

## 2023-04-06 DIAGNOSIS — M54.12 CERVICAL RADICULOPATHY: Primary | ICD-10-CM

## 2023-04-06 DIAGNOSIS — M48.02 SPINAL STENOSIS, CERVICAL REGION: Primary | ICD-10-CM

## 2023-04-06 DIAGNOSIS — M54.12 CERVICAL RADICULOPATHY: ICD-10-CM

## 2023-04-06 PROCEDURE — 3046F HEMOGLOBIN A1C LEVEL >9.0%: CPT | Mod: CPTII,S$GLB,, | Performed by: ORTHOPAEDIC SURGERY

## 2023-04-06 PROCEDURE — 99205 PR OFFICE/OUTPT VISIT, NEW, LEVL V, 60-74 MIN: ICD-10-PCS | Mod: S$GLB,,, | Performed by: PHYSICIAN ASSISTANT

## 2023-04-06 PROCEDURE — 3061F NEG MICROALBUMINURIA REV: CPT | Mod: CPTII,S$GLB,, | Performed by: ORTHOPAEDIC SURGERY

## 2023-04-06 PROCEDURE — 3008F PR BODY MASS INDEX (BMI) DOCUMENTED: ICD-10-PCS | Mod: CPTII,S$GLB,, | Performed by: PHYSICIAN ASSISTANT

## 2023-04-06 PROCEDURE — 1159F PR MEDICATION LIST DOCUMENTED IN MEDICAL RECORD: ICD-10-PCS | Mod: CPTII,S$GLB,, | Performed by: PHYSICIAN ASSISTANT

## 2023-04-06 PROCEDURE — 3066F PR DOCUMENTATION OF TREATMENT FOR NEPHROPATHY: ICD-10-PCS | Mod: CPTII,S$GLB,, | Performed by: PHYSICIAN ASSISTANT

## 2023-04-06 PROCEDURE — 1160F PR REVIEW ALL MEDS BY PRESCRIBER/CLIN PHARMACIST DOCUMENTED: ICD-10-PCS | Mod: CPTII,S$GLB,, | Performed by: PHYSICIAN ASSISTANT

## 2023-04-06 PROCEDURE — 99212 OFFICE O/P EST SF 10 MIN: CPT | Mod: S$GLB,,, | Performed by: ORTHOPAEDIC SURGERY

## 2023-04-06 PROCEDURE — 99999 PR PBB SHADOW E&M-EST. PATIENT-LVL III: CPT | Mod: PBBFAC,,, | Performed by: ORTHOPAEDIC SURGERY

## 2023-04-06 PROCEDURE — 3074F SYST BP LT 130 MM HG: CPT | Mod: CPTII,S$GLB,, | Performed by: PHYSICIAN ASSISTANT

## 2023-04-06 PROCEDURE — 3074F PR MOST RECENT SYSTOLIC BLOOD PRESSURE < 130 MM HG: ICD-10-PCS | Mod: CPTII,S$GLB,, | Performed by: PHYSICIAN ASSISTANT

## 2023-04-06 PROCEDURE — 3066F NEPHROPATHY DOC TX: CPT | Mod: CPTII,S$GLB,, | Performed by: ORTHOPAEDIC SURGERY

## 2023-04-06 PROCEDURE — 3046F PR MOST RECENT HEMOGLOBIN A1C LEVEL > 9.0%: ICD-10-PCS | Mod: CPTII,S$GLB,, | Performed by: PHYSICIAN ASSISTANT

## 2023-04-06 PROCEDURE — 99999 PR PBB SHADOW E&M-EST. PATIENT-LVL V: ICD-10-PCS | Mod: PBBFAC,,, | Performed by: PHYSICIAN ASSISTANT

## 2023-04-06 PROCEDURE — 3061F PR NEG MICROALBUMINURIA RESULT DOCUMENTED/REVIEW: ICD-10-PCS | Mod: CPTII,S$GLB,, | Performed by: PHYSICIAN ASSISTANT

## 2023-04-06 PROCEDURE — 1160F RVW MEDS BY RX/DR IN RCRD: CPT | Mod: CPTII,S$GLB,, | Performed by: ORTHOPAEDIC SURGERY

## 2023-04-06 PROCEDURE — 99212 PR OFFICE/OUTPT VISIT, EST, LEVL II, 10-19 MIN: ICD-10-PCS | Mod: S$GLB,,, | Performed by: ORTHOPAEDIC SURGERY

## 2023-04-06 PROCEDURE — 1160F PR REVIEW ALL MEDS BY PRESCRIBER/CLIN PHARMACIST DOCUMENTED: ICD-10-PCS | Mod: CPTII,S$GLB,, | Performed by: ORTHOPAEDIC SURGERY

## 2023-04-06 PROCEDURE — 99999 PR PBB SHADOW E&M-EST. PATIENT-LVL III: ICD-10-PCS | Mod: PBBFAC,,, | Performed by: ORTHOPAEDIC SURGERY

## 2023-04-06 PROCEDURE — 3078F DIAST BP <80 MM HG: CPT | Mod: CPTII,S$GLB,, | Performed by: PHYSICIAN ASSISTANT

## 2023-04-06 PROCEDURE — 3046F HEMOGLOBIN A1C LEVEL >9.0%: CPT | Mod: CPTII,S$GLB,, | Performed by: PHYSICIAN ASSISTANT

## 2023-04-06 PROCEDURE — 3066F NEPHROPATHY DOC TX: CPT | Mod: CPTII,S$GLB,, | Performed by: PHYSICIAN ASSISTANT

## 2023-04-06 PROCEDURE — 1159F MED LIST DOCD IN RCRD: CPT | Mod: CPTII,S$GLB,, | Performed by: ORTHOPAEDIC SURGERY

## 2023-04-06 PROCEDURE — 3078F PR MOST RECENT DIASTOLIC BLOOD PRESSURE < 80 MM HG: ICD-10-PCS | Mod: CPTII,S$GLB,, | Performed by: PHYSICIAN ASSISTANT

## 2023-04-06 PROCEDURE — 3061F NEG MICROALBUMINURIA REV: CPT | Mod: CPTII,S$GLB,, | Performed by: PHYSICIAN ASSISTANT

## 2023-04-06 PROCEDURE — 3066F PR DOCUMENTATION OF TREATMENT FOR NEPHROPATHY: ICD-10-PCS | Mod: CPTII,S$GLB,, | Performed by: ORTHOPAEDIC SURGERY

## 2023-04-06 PROCEDURE — 1159F PR MEDICATION LIST DOCUMENTED IN MEDICAL RECORD: ICD-10-PCS | Mod: CPTII,S$GLB,, | Performed by: ORTHOPAEDIC SURGERY

## 2023-04-06 PROCEDURE — 1159F MED LIST DOCD IN RCRD: CPT | Mod: CPTII,S$GLB,, | Performed by: PHYSICIAN ASSISTANT

## 2023-04-06 PROCEDURE — 3008F BODY MASS INDEX DOCD: CPT | Mod: CPTII,S$GLB,, | Performed by: PHYSICIAN ASSISTANT

## 2023-04-06 PROCEDURE — 3061F PR NEG MICROALBUMINURIA RESULT DOCUMENTED/REVIEW: ICD-10-PCS | Mod: CPTII,S$GLB,, | Performed by: ORTHOPAEDIC SURGERY

## 2023-04-06 PROCEDURE — 1160F RVW MEDS BY RX/DR IN RCRD: CPT | Mod: CPTII,S$GLB,, | Performed by: PHYSICIAN ASSISTANT

## 2023-04-06 PROCEDURE — 99205 OFFICE O/P NEW HI 60 MIN: CPT | Mod: S$GLB,,, | Performed by: PHYSICIAN ASSISTANT

## 2023-04-06 PROCEDURE — 99999 PR PBB SHADOW E&M-EST. PATIENT-LVL V: CPT | Mod: PBBFAC,,, | Performed by: PHYSICIAN ASSISTANT

## 2023-04-06 PROCEDURE — 3046F PR MOST RECENT HEMOGLOBIN A1C LEVEL > 9.0%: ICD-10-PCS | Mod: CPTII,S$GLB,, | Performed by: ORTHOPAEDIC SURGERY

## 2023-04-06 NOTE — PATIENT INSTRUCTIONS
You have a left-sided disc herniation at C6-7 that is compressing the C7 nerve root.  This nerve root controls the strength in your triceps and explains the weakness on your exam today.  I recommend surgical intervention to decompress the nerve.  The CT we ordered today is to evaluate if an approach from the front of your neck is possible/safe.  (A surgery from the front (anterior approach) is called an ACDF). If it is not, we will plan for a posterior approach (from the back of the neck).     Regardless of the approach, he would need to wear a cervical collar for 6 weeks.  During that time he would not be able to drive, lift more than 10 lb, or bend/twist your spine.    Given your increased risk of surgery due to h/o blood clots and elevated A1c, you will need clearance from primary care before we can schedule surgery.  Additionally, we will need your A1c at least below 8, ideally below 7.  We will schedule your follow up with Dr. Good after clearance has been obtained.  I will tentatively put you on the schedule for April 26 at Dr. Good's [place] location    In the meantime, please avoid excess strain on your neck and no lifting > 10 lb.

## 2023-04-06 NOTE — PROGRESS NOTES
[facility]  Neurosurgery    SUBJECTIVE:     History of Present Illness:  Abel Morris is a 57 y.o. male with h/o DVT and PE, dm, and HTN who presents with acute cervical radiculopathy.    Symptom onset:  6 weeks ago with no inciting event or trauma  Location:  Left neck with radiation into left shoulder and down left arm into the medial hand  Pain level:  4/10 today  Inciting factors:  Lifting and neck extension > flexion  Relieving factors:  Rest  Numbness:  Tingling in 4th and 5th finger on the left  Weakness:  Present in left arm  Endorses dropping items from hands.     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  He has been doing this for the past 4 months.  Recently he almost dropped a box of frozen chicken due to weakness in his left arm.    Treatments tried:  -PT:  Has not tried  -TITI:  Has not tried  -Gabapentin:  Has not tried  -Muscle relaxer:  Tried with some relief but found to be sedating and can not tolerate during the day  -Rx pain medications:  Celebrex, tramadol.  Can not tolerate tramadol during the day 2/2 drowsiness  -Spine surgery:  Never    Blood thinners:  ASA 81   H/o DVT and PE  A1c=9.4 on 03/03/2023    (Not in a hospital admission)      Review of patient's allergies indicates:  No Known Allergies    Past Medical History:   Diagnosis Date    Arthritis     Diabetes mellitus type II, uncontrolled 11/28/2012    Diabetes mellitus with neurological manifestations, uncontrolled 6/13/2014    ED (erectile dysfunction) 11/28/2012    Eye injury     os hit broken orbital bone     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    History of colonic polyps 8/17/2018    Hyperlipidemia 11/28/2012    Poor compliance 1/8/2014    Primary hypertension 10/9/2021     Past Surgical History:   Procedure Laterality Date    COLONOSCOPY N/A 8/2/2016    Procedure: COLONOSCOPY;  Surgeon: Miguel Persaud MD;  Location: [facility];  Service: Endoscopy;  Laterality: N/A;    COLONOSCOPY N/A 9/3/2019    Procedure:  COLONOSCOPY;  Surgeon: Álvaro Carmichael MD;  Location: [facility];  Service: Endoscopy;  Laterality: N/A;  confirmed appt- sp    LUMBAR PUNCTURE N/A 09/19/2019     Family History   Problem Relation Age of Onset    No Known Problems Mother     Colon polyps Father 61    No Known Problems Sister     No Known Problems Brother     No Known Problems Maternal Aunt     No Known Problems Maternal Uncle     No Known Problems Paternal Aunt     No Known Problems Paternal Uncle     Diabetes Maternal Grandmother     No Known Problems Maternal Grandfather     No Known Problems Paternal Grandmother     No Known Problems Paternal Grandfather     Amblyopia Neg Hx     Blindness Neg Hx     Cancer Neg Hx     Cataracts Neg Hx     Glaucoma Neg Hx     Hypertension Neg Hx     Macular degeneration Neg Hx     Retinal detachment Neg Hx     Strabismus Neg Hx     Stroke Neg Hx     Thyroid disease Neg Hx      Social History     Tobacco Use    Smoking status: Former     Types: Cigars    Smokeless tobacco: Never   Substance Use Topics    Alcohol use: Yes     Alcohol/week: 6.0 standard drinks     Types: 6 Cans of beer per week     Comment: per week    Drug use: No        Review of Systems:  As noted in HPI    OBJECTIVE:     Vital Signs (Most Recent):  Pulse: 91 (04/06/23 0841)  BP: 117/70 (04/06/23 0841)  SpO2: 95 % (04/06/23 0841)    Physical Exam:  General: well developed, well nourished, no distress  Head: normocephalic, atraumatic  Neurologic: Alert and oriented. Thought content appropriate  GCS: Motor: 6/Verbal: 5/Eyes: 4 GCS Total: 15  Language: No aphasia  Speech: No dysarthria  Cranial nerves: face symmetric, tongue midline, CN II-XII grossly intact.   Eyes: pupils equal, round, reactive to light with accommodation, EOMI.   Pulmonary: normal respirations, not labored, no accessory muscles used  Sensory: intact to light touch throughout; tingling sensation to medial left hand  Motor Strength: Moves all extremities spontaneously with good tone.   Full strength upper and lower extremities. No abnormal movements seen.     Strength  Deltoids Triceps Biceps Wrist Extension Wrist Flexion Hand  FA   Upper: R 5/5 5/5 5/5 5/5 5/5 5/5 3/5    L 5/5 4-/5 5/5 5/5 5/5 5/5 3/5     Iliopsoas Quadriceps Knee  Flexion Tibialis  anterior Gastro- cnemius EHL    Lower: R 5/5 5/5 5/5 5/5 5/5 5/5     L 5/5 5/5 5/5 5/5 5/5 5/5      Hanson: absent    Skin: warm, dry and intact, no rashes  Gait: normal     Cervical ROM:  Decreased in flexion-extension    Spurling's:  Positive on left  Midline Bony Tenderness:  Positive over CT junction    Diagnostic Results:  I have personally reviewed imaging and agree with the findings.     MRI cervical spine 04/03/2023   Small left foraminal disc protrusion at C6-7, possibly impinging upon the exiting left C7 nerve root, as above.    ASSESSMENT/PLAN:     Abel Morris is a 57 y.o. male who presents with acute left C7 radiculopathy causing significant triceps weakness and radicular pain.  This is consistent with the left-sided disc herniation at C6-7 compressing the C7 nerve root.  I believe he would benefit from surgical decompression.  I will order a cervical CT to determine if an anterior approach would be possible given some evidence possible posterior osteophyte formation at C6-7.    Although surgery is recommended, he will need PCP clearance before proceeding.  He does have an elevated A1c which we would need to be below 8, ideally below 7.  He also has a h/o DVT and PE on aspirin.  He would need to be off of aspirin 5 days prior to surgery in 10 days after surgery.    Lastly, he has significant finger abduction weakness with loss of muscle tone.  This is not consistent with MRI findings in would benefit from EMG evaluation, which is scheduled later this month.     Follow up with Dr. Good after surgical clearance is obtained to review new results and plan for surgery.  In the meantime, he should avoid excess strain on his neck  with no lifting > 10 lb.    Please feel free to call with any further questions        Meme Ricks PA-C  [facility]  Department of Neurosurgery  [phone]    Disclaimer: This note was dictated by speech recognition. Minor errors in transcription may be present.  Please call with any questions.

## 2023-04-06 NOTE — PROGRESS NOTES
Assessment: 57 y.o. male with left rotator cuff tendinitis, cervical pathology     I explained my diagnostic impression and the reasoning behind it in detail, using layman's terms.      Plan:   - Continue treatment for C7 radic  - Follow up with me if surgery does not improve his shoulder pain     All questions were answered in detail. The patient is in full agreement with the treatment plan and will proceed accordingly.    Chief Complaint   Patient presents with    Left Shoulder - Pain       Initial visit (3/16/23): Abel Morris is a 57 y.o. male who presents today complaining of left shoulder pain  Duration of symptoms:  about 4 weeks  Trauma or new activity: no  Pain is constant  Tried multiple OTC NSAIDs at home without relief   Pain was so severe about 2 weeks after pain started that he had to go to the ER.  They gave him an IM toradol and robaxin without any relief. Saw his PCP - has tried celebrex and tizandine also without any relief   Aggravating factors: reaching overhead, reaching in front  Relieving factors: rest  Cannot tie his shoes because his pain is so bad   Night pain is present and is disruptive to sleep  Radicular symptoms: + neck pain, pain radiates down to the hand, + tingling in hand    Pain does interfere with duties at work, sleep, and activities of daily living .  Does not tolerate gabapentin     4/6/23  Here for preivously scheduled follow up   Since I last saw him he was seen by neurosurgery and diagnosed with C6 L radic, is scheduled for surgery with Dr Good on 4/19    This is the extent of the patient's complaints at this time.     Hand dominance: Right     Occupation: [employer]      Review of patient's allergies indicates:  No Known Allergies      Physical Exam:   Vitals:    04/06/23 1507   PainSc:   5   PainLoc: Shoulder     General: Patient is alert, awake and oriented to time, place and person. Mood and affect are appropriate.  Patient does not appear to be in any distress,  denies any constitutional symptoms and appears stated age.   HEENT: Pupils are equal and round, sclera are not injected. External examination of ears and nose reveals no abnormalities. Cranial nerves II-X are grossly intact  Neck: examination demonstrates painless  active range of motion. Spurling's sign is positive  Skin: no rashes, abrasions or open wounds on the affected extremity   Resp: No respiratory distress or audible wheezing   CV: 2+  pulses, all extremities warm and well perfused   Left Shoulder    Shoulder Range of Motion    Right     Left   (Active/Passive)       Forward Elevation     165/165           165/165  External rotation (arm at side)  45/45             45/45   Internal rotation behind the back  L5             L5     Range of motion is painful     Acromioclavicular joint is not tender  Crossbody test: negative    Neer's positive  Hawkin's positive    Byron's positive  Drop arm negative  Belly press negative      Cuff Strength     Right     Left   Supraspinatus        5/5    5/5  Infraspinatus     5/5    5/5  Subscapularis     5/5    5/5    Deltoid testing            5/5    5/5    Speeds negative  Yergasons negative    Elbow examination demonstrates no tenderness to palpation and has normal range of motion.     ltsi C5-T1  + epl, io, fds, fdp   2+ RP      Imaging: no new      This note was created by combination of typed  and M-Modal dictation. Transcription and phonetic errors may be present.  If there are any questions, please contact me.      Current Outpatient Medications:     blood sugar diagnostic Strp, To check BG 2 times daily, to use with insurance preferred meter, Disp: 200 each, Rfl: 3    celecoxib (CELEBREX) 200 MG capsule, Take 1 capsule (200 mg total) by mouth 2 (two) times daily., Disp: 30 capsule, Rfl: 0    dulaglutide (TRULICITY) 1.5 mg/0.5 mL pen injector, Inject 1.5 mg into the skin every 7 days., Disp: 4 pen, Rfl: 5    insulin glargine, TOUJEO, (TOUJEO SOLOSTAR U-300  "INSULIN) 300 unit/mL (1.5 mL) InPn pen, Inject 24 units once daily, Disp: 6 Syringe, Rfl: 2    lancets Misc, To check BG 2 times daily, to use with insurance preferred meter, Disp: 200 each, Rfl: 3    metFORMIN (GLUCOPHAGE) 1000 MG tablet, Take 1 tablet by mouth twice daily, Disp: 180 tablet, Rfl: 2    multivitamin (THERAGRAN) per tablet, Take 1 tablet by mouth once daily., Disp: , Rfl:     pen needle, diabetic (BD ULTRA-FINE SHORT PEN NEEDLE) 31 gauge x 5/16" Ndle, USE  ONCE DAILY, Disp: 100 each, Rfl: 0    pravastatin (PRAVACHOL) 40 MG tablet, Take 1 tablet (40 mg total) by mouth once daily., Disp: 90 tablet, Rfl: 0    sildenafil (REVATIO) 20 mg Tab, 1-5 pills at a time per day prn, Disp: 90 tablet, Rfl: 1    TRUEPLUS LANCETS 33 gauge Misc, USE 1 TO CHECK GLUCOSE TWICE DAILY, Disp: , Rfl:     aspirin (ECOTRIN) 81 MG EC tablet, Take 1 tablet (81 mg total) by mouth once daily., Disp: , Rfl: 0    blood-glucose meter kit, To check BG 3 times daily, to use with insurance preferred meter, Disp: 1 each, Rfl: 0    glimepiride (AMARYL) 2 MG tablet, Take 1 tablet (2 mg total) by mouth before breakfast., Disp: 90 tablet, Rfl: 2    hydroCHLOROthiazide (HYDRODIURIL) 12.5 MG Tab, Take 1 tablet (12.5 mg total) by mouth once daily., Disp: 30 tablet, Rfl: 1    Past Medical History:   Diagnosis Date    Arthritis     Diabetes mellitus type II, uncontrolled 11/28/2012    Diabetes mellitus with neurological manifestations, uncontrolled 6/13/2014    ED (erectile dysfunction) 11/28/2012    Eye injury     os hit broken orbital bone     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    History of colonic polyps 8/17/2018    Hyperlipidemia 11/28/2012    Poor compliance 1/8/2014    Primary hypertension 10/9/2021       Active Problem List with Overview Notes    Diagnosis Date Noted    Cervical spondylosis 03/24/2023    DDD (degenerative disc disease), cervical 03/24/2023    Cervical radiculopathy 03/24/2023    Primary hypertension 10/09/2021    Obesity " (BMI 30.0-34.9) 10/09/2021    Acute pulmonary embolism witho right heart strain 10/08/2021    Type 2 diabetes mellitus with diabetic neuropathy, without long-term current use of insulin 10/08/2021    Hypokalemia 10/08/2021    Refractive error 02/04/2021    Polyneuropathy 09/19/2019    Colon cancer screening 09/03/2019    Diabetic polyneuropathy associated with type 2 diabetes mellitus 08/12/2019    History of colonic polyps 08/17/2018    Blood in stool 08/02/2016    Varicose veins of lower extremities with inflammation 01/14/2016    Overweight (BMI 25.0-29.9) 01/10/2016    Diabetes mellitus with neurological manifestations, uncontrolled 06/13/2014    Poor compliance 01/08/2014    HDL lipoprotein deficiency 01/08/2014    Medial epicondylitis 01/08/2014    ED (erectile dysfunction) 11/28/2012    Hyperlipidemia 11/28/2012    Diabetes mellitus type II, uncontrolled 11/28/2012       Past Surgical History:   Procedure Laterality Date    COLONOSCOPY N/A 8/2/2016    Procedure: COLONOSCOPY;  Surgeon: Miguel Persaud MD;  Location: [facility] ENDO;  Service: Endoscopy;  Laterality: N/A;    COLONOSCOPY N/A 9/3/2019    Procedure: COLONOSCOPY;  Surgeon: Álvaro Carmichael MD;  Location: [facility] ENDO;  Service: Endoscopy;  Laterality: N/A;  confirmed appt- sp    LUMBAR PUNCTURE N/A 09/19/2019       Social History     Socioeconomic History    Marital status:    Tobacco Use    Smoking status: Former     Types: Cigars    Smokeless tobacco: Never   Substance and Sexual Activity    Alcohol use: Yes     Alcohol/week: 6.0 standard drinks     Types: 6 Cans of beer per week     Comment: per week    Drug use: No    Sexual activity: Not Currently     Social Determinants of Health     Financial Resource Strain: Low Risk     Difficulty of Paying Living Expenses: Not hard at all   Food Insecurity: No Food Insecurity    Worried About Running Out of Food in the Last Year: Never true    Ran Out of Food in the Last Year: Never true   Transportation  Needs: No Transportation Needs    Lack of Transportation (Medical): No    Lack of Transportation (Non-Medical): No   Physical Activity: Sufficiently Active    Days of Exercise per Week: 5 days    Minutes of Exercise per Session: 150+ min   Stress: Stress Concern Present    Feeling of Stress : To some extent   Social Connections: Unknown    Frequency of Communication with Friends and Family: More than three times a week    Frequency of Social Gatherings with Friends and Family: Once a week    Active Member of Clubs or Organizations: Yes    Attends Club or Organization Meetings: More than 4 times per year    Marital Status:    Housing Stability: Low Risk     Unable to Pay for Housing in the Last Year: No    Number of Places Lived in the Last Year: 1    Unstable Housing in the Last Year: No

## 2023-04-06 NOTE — TELEPHONE ENCOUNTER
Spoke with patient. Will defer filling out the form until after he sees pcp next week to determine if clearance will be given

## 2023-04-10 ENCOUNTER — HOSPITAL ENCOUNTER (OUTPATIENT)
Dept: RADIOLOGY | Facility: HOSPITAL | Age: 58
Discharge: HOME | End: 2023-04-10
Attending: PHYSICIAN ASSISTANT
Payer: COMMERCIAL

## 2023-04-10 DIAGNOSIS — M54.12 CERVICAL RADICULOPATHY: ICD-10-CM

## 2023-04-10 DIAGNOSIS — M48.02 SPINAL STENOSIS, CERVICAL REGION: ICD-10-CM

## 2023-04-10 PROCEDURE — 72125 CT NECK SPINE W/O DYE: CPT | Mod: TC

## 2023-04-10 PROCEDURE — 72125 CT CERVICAL SPINE WITHOUT CONTRAST: ICD-10-PCS | Mod: 26,,, | Performed by: RADIOLOGY

## 2023-04-10 PROCEDURE — 72125 CT NECK SPINE W/O DYE: CPT | Mod: 26,,, | Performed by: RADIOLOGY

## 2023-04-11 ENCOUNTER — OFFICE VISIT (OUTPATIENT)
Dept: FAMILY MEDICINE | Facility: CLINIC | Age: 58
End: 2023-04-11
Payer: COMMERCIAL

## 2023-04-11 VITALS
HEIGHT: 77 IN | SYSTOLIC BLOOD PRESSURE: 122 MMHG | TEMPERATURE: 98 F | HEART RATE: 86 BPM | WEIGHT: 231.69 LBS | DIASTOLIC BLOOD PRESSURE: 80 MMHG | OXYGEN SATURATION: 98 % | BODY MASS INDEX: 27.36 KG/M2

## 2023-04-11 DIAGNOSIS — Z86.010 HISTORY OF COLONIC POLYPS: ICD-10-CM

## 2023-04-11 DIAGNOSIS — Z12.5 SCREENING FOR PROSTATE CANCER: ICD-10-CM

## 2023-04-11 DIAGNOSIS — E11.40 TYPE 2 DIABETES MELLITUS WITH DIABETIC NEUROPATHY, WITHOUT LONG-TERM CURRENT USE OF INSULIN: ICD-10-CM

## 2023-04-11 DIAGNOSIS — I87.2 VENOUS INSUFFICIENCY: ICD-10-CM

## 2023-04-11 DIAGNOSIS — Z91.199 POOR COMPLIANCE: ICD-10-CM

## 2023-04-11 DIAGNOSIS — E11.65 UNCONTROLLED TYPE 2 DIABETES MELLITUS WITH HYPERGLYCEMIA: ICD-10-CM

## 2023-04-11 DIAGNOSIS — Z79.4 LONG-TERM INSULIN USE: ICD-10-CM

## 2023-04-11 DIAGNOSIS — M54.12 CERVICAL RADICULOPATHY: ICD-10-CM

## 2023-04-11 DIAGNOSIS — E11.40 TYPE 2 DIABETES, CONTROLLED, WITH NEUROPATHY: ICD-10-CM

## 2023-04-11 DIAGNOSIS — I10 PRIMARY HYPERTENSION: ICD-10-CM

## 2023-04-11 DIAGNOSIS — Z86.711 HISTORY OF PULMONARY EMBOLUS (PE): ICD-10-CM

## 2023-04-11 DIAGNOSIS — E66.3 OVERWEIGHT (BMI 25.0-29.9): ICD-10-CM

## 2023-04-11 DIAGNOSIS — G62.9 POLYNEUROPATHY: ICD-10-CM

## 2023-04-11 DIAGNOSIS — E78.5 HYPERLIPIDEMIA, UNSPECIFIED HYPERLIPIDEMIA TYPE: ICD-10-CM

## 2023-04-11 DIAGNOSIS — E11.42 DIABETIC POLYNEUROPATHY ASSOCIATED WITH TYPE 2 DIABETES MELLITUS: ICD-10-CM

## 2023-04-11 DIAGNOSIS — Z01.818 PREOPERATIVE EXAMINATION: Primary | ICD-10-CM

## 2023-04-11 PROCEDURE — 3008F PR BODY MASS INDEX (BMI) DOCUMENTED: ICD-10-PCS | Mod: CPTII,S$GLB,, | Performed by: INTERNAL MEDICINE

## 2023-04-11 PROCEDURE — 3061F PR NEG MICROALBUMINURIA RESULT DOCUMENTED/REVIEW: ICD-10-PCS | Mod: CPTII,S$GLB,, | Performed by: INTERNAL MEDICINE

## 2023-04-11 PROCEDURE — 99215 PR OFFICE/OUTPT VISIT, EST, LEVL V, 40-54 MIN: ICD-10-PCS | Mod: S$GLB,,, | Performed by: INTERNAL MEDICINE

## 2023-04-11 PROCEDURE — 3046F HEMOGLOBIN A1C LEVEL >9.0%: CPT | Mod: CPTII,S$GLB,, | Performed by: INTERNAL MEDICINE

## 2023-04-11 PROCEDURE — 3079F PR MOST RECENT DIASTOLIC BLOOD PRESSURE 80-89 MM HG: ICD-10-PCS | Mod: CPTII,S$GLB,, | Performed by: INTERNAL MEDICINE

## 2023-04-11 PROCEDURE — 99999 PR PBB SHADOW E&M-EST. PATIENT-LVL III: ICD-10-PCS | Mod: PBBFAC,,, | Performed by: INTERNAL MEDICINE

## 2023-04-11 PROCEDURE — 99215 OFFICE O/P EST HI 40 MIN: CPT | Mod: S$GLB,,, | Performed by: INTERNAL MEDICINE

## 2023-04-11 PROCEDURE — 3074F SYST BP LT 130 MM HG: CPT | Mod: CPTII,S$GLB,, | Performed by: INTERNAL MEDICINE

## 2023-04-11 PROCEDURE — 3079F DIAST BP 80-89 MM HG: CPT | Mod: CPTII,S$GLB,, | Performed by: INTERNAL MEDICINE

## 2023-04-11 PROCEDURE — 3046F PR MOST RECENT HEMOGLOBIN A1C LEVEL > 9.0%: ICD-10-PCS | Mod: CPTII,S$GLB,, | Performed by: INTERNAL MEDICINE

## 2023-04-11 PROCEDURE — 1159F MED LIST DOCD IN RCRD: CPT | Mod: CPTII,S$GLB,, | Performed by: INTERNAL MEDICINE

## 2023-04-11 PROCEDURE — 3008F BODY MASS INDEX DOCD: CPT | Mod: CPTII,S$GLB,, | Performed by: INTERNAL MEDICINE

## 2023-04-11 PROCEDURE — 3066F NEPHROPATHY DOC TX: CPT | Mod: CPTII,S$GLB,, | Performed by: INTERNAL MEDICINE

## 2023-04-11 PROCEDURE — 3066F PR DOCUMENTATION OF TREATMENT FOR NEPHROPATHY: ICD-10-PCS | Mod: CPTII,S$GLB,, | Performed by: INTERNAL MEDICINE

## 2023-04-11 PROCEDURE — 1159F PR MEDICATION LIST DOCUMENTED IN MEDICAL RECORD: ICD-10-PCS | Mod: CPTII,S$GLB,, | Performed by: INTERNAL MEDICINE

## 2023-04-11 PROCEDURE — 3074F PR MOST RECENT SYSTOLIC BLOOD PRESSURE < 130 MM HG: ICD-10-PCS | Mod: CPTII,S$GLB,, | Performed by: INTERNAL MEDICINE

## 2023-04-11 PROCEDURE — 99999 PR PBB SHADOW E&M-EST. PATIENT-LVL III: CPT | Mod: PBBFAC,,, | Performed by: INTERNAL MEDICINE

## 2023-04-11 PROCEDURE — 3061F NEG MICROALBUMINURIA REV: CPT | Mod: CPTII,S$GLB,, | Performed by: INTERNAL MEDICINE

## 2023-04-11 RX ORDER — INSULIN GLARGINE 300 U/ML
INJECTION, SOLUTION SUBCUTANEOUS
Qty: 6 PEN | Refills: 12 | Status: SHIPPED | OUTPATIENT
Start: 2023-04-11

## 2023-04-11 RX ORDER — GLIMEPIRIDE 2 MG/1
2 TABLET ORAL
Qty: 90 TABLET | Refills: 2 | Status: SHIPPED | OUTPATIENT
Start: 2023-04-11 | End: 2024-04-10

## 2023-04-11 RX ORDER — DULAGLUTIDE 1.5 MG/.5ML
1.5 INJECTION, SOLUTION SUBCUTANEOUS
Qty: 4 PEN | Refills: 5 | Status: SHIPPED | OUTPATIENT
Start: 2023-04-11

## 2023-04-11 RX ORDER — PRAVASTATIN SODIUM 40 MG/1
40 TABLET ORAL DAILY
Qty: 90 TABLET | Refills: 0 | Status: SHIPPED | OUTPATIENT
Start: 2023-04-11

## 2023-04-11 NOTE — PROGRESS NOTES
Chief complaint:  Preop    57-year-old black male . Last seen 2016 then 8/18, 8/19 then 3/21, 10/21 .  PSA 2018. MULTIPLE and large polyps 8/16 -ok 9/19 -5 yrs   . .     Patient seen in preop consultation today from Neurosurgery Dr. Good.  Apparently has cervical disc disease causing weakness and atrophy of the intrinsic muscles in both hands.  He definitely has weakness in the left arm although he did not notice it until he was tested by Neurosurgery.  He does have radicular symptoms down the left arm as well.  Neurosurgery notes reviewed.  Obviously they would like his diabetes under better control but there is a potential surgery date this month of April.  We did discuss that it will take 2-3 months for his average to improve although sugars appear to be normalizing and we can expect him to improve significantly once back on insulin Amaryl and Trulicity, essentially missing 3/4 of his diabetic medications.  I would expect him to rapidly improve and he will document his sugars 3-4 times per day and present those to Neurosurgery.    No complications related to prior procedures although does not have an extensive surgical history.  Prior EKG reviewed and unremarkable.  He has no chest pains or shortness of breath.  He had a pulmonary embolus related to varicose veins that have been treated apparently in the interval.  He completed a three-month course of anticoagulation.  It is clear for him to hold aspirin.    A1c 6.8 in 2021 then 9.4.  Patient apparently only on metformin and just for refill issues off of insulin, Trulicity and Amaryl which likely explains the rise sugars.  He has been monitoring with improved diet and it is getting better.  We will restart all of his medications.       Reviewed prior 2021 hospitalization.  He was cutting his grass and acute tightness in the chest and shortness of breath.  He was diagnosed with a pulmonary embolus.  His ultrasound was negative except for the right superficial clot  that has been there.  We discussed the high probability this could occur again since the night is for the pulmonary embolus presumably is the chronic superficial thrombosis and that still being present would likely indicated increased risk for PE.  He does have a follow-up with vascular scheduled early January and advised him of that not will likely be our 1st time we can assess the need for ongoing anticoagulation or least time we could interrupt anticoagulation to have a vascular procedure.  We discussed not using aspirin or any anti-inflammatories along with the blood thinner and to watch for any signs of bleeding in the stool, urine and so forth.  We discussed being careful around power tools and other such activities.  He is feeling fine with no chest pains or shortness of breath no lower extremity swelling.  He denied any trauma to the lower extremities or any sedentary activity or long car rides and so forth prior to the PE  * Acute pulmonary embolism witho right heart strain  -Mr. Morris was admitted to inpatient status  -CTA Chest showed extensive pulmonary thromboemboli involving bilateral upper and lower lobar and segmental arteries.  No evidence of central saddle embolus.  Bowing of the interventricular septum likely reflecting right heart strain.  -Troponin elevated at 0.11  -Echo showed normal EF  -Doppler US showed a chronic non-occlusive DVT in R greater Saphenous vein  -No indication for tPA or thrombectomy  -PESI 84 consistend with 1.7 to 3.5% mortality  -Initially treated with full dose lovenox and transitioned to eliquis  -Will d/c home today on eliquis for at least 3 months.  Will refer to hematology oncology for evaluation.     Vascular treated varicose veins     He is poorly compliant with diabetic follow-up.  He said a message that he was developing bilateral leg weakness and some other unspecified symptoms we communicated back and forth over the computer.  Even the possibility of a spinal  problem he was referred to Orthopedics in the interval.  He did have an interval appointment with me that apparently he reschedule due to the weather.  Orthopedic note reviewed and was felt to be secondary to diabetic neuropathy.      Reviewed neurology assessment.  Agree with seeing vascular.  He does have a swollen right lower extremity which is more swollen over the years and he has had varicose veins.  He clearly has venous insufficiency worse on the right than the left and some of this could be contributing to his pain.  He has had superficial blood clots which need to be evaluated any may not have had an ultrasound of the left leg yet.  We discussed stockings in the future and that some of this could easily be treated with laser.    Impression:  No evidence of DVT in the right lower extremity.  Chronic thrombus within the mid to lower greater saphenous vein.  Probable chronically thrombosed varicose veins located laterally at the level of the knee and not significantly changed from prior ultrasound dated 01/10/2016.  Electronically signed by resident: Malcolm Perez  Date:                                            02/09/2021    Discussed it diabetic neuropathy.  He is taking 300 gabapentin 3 times per day.  He had Cymbalta at any says it makes him sleepy but advised him I think it might be the gabapentin more so but he can try taking the Cymbalta at night and I would agree with the Cymbalta and titration.  Gabapentin should also be increased and he can start with the nighttime dose going up to a 900 her 1200 mg if necessary.  He will call me and I will refill the prescription.  Lyrica may cause too much fluid retention and I would hold off on switching him to that until he gets his venous insufficiency changes addressed     NEURO -NM neuro in [place]  Assessment and Plan:  Abel Morris is a 53 y.o., man with peripheral polyneuropathy as a consequence of his diabetes and history of heavy alcohol  "intake.  His elevated protein on CSF is consistent with his longstanding, poorly controlled diabetes.  Will add other labs given his history to exclude any other exacerbating factors.     At this point, the best measure to prevent worsening is to limit use of alcohol and get blood sugar under control.   Advise an exercise program with balance and strengthening.   Advise neuropathic pain control as needed.  Gabapentin can be further maximized up to 1200mg TID as long as his kidney function tolerates.  Options include Lyrica, Trileptal, Amitriptyline  Also advise adding alpha lipoic acid 600mg daily for diabetic neuropathy management.  Advise seeing podiatry regularly for foot care in the setting of diabetes.  Advise seeing ophthalmology yearly for eye care in the setting of diabetes.   Not sure if he should see a peripheral vascular specialist.  Will bring this up to his PCP and defer referral to them so he can see someone more local.    Referral to pain management for painful diabetic neuropathy.      Seen Ortho:  HPI: Abel Morris is a 53 y.o. male who presents today complaining of bilateral leg pain     Duration of symptoms:  6 months  Trauma or new activity: no  Pain is constant  Has a very difficult time relaying his symptoms and responds that he is "unsure" or "it is hard to describe" in response to many questions  Occasional back pain   No numbness or paresthesias  Symptoms are not consistent with radiculopathy or myelopathy  Legs "feel tight" - describes this sensation as the feeling of a vacuum sealed back   Feels that is difficult to walk after he has been on his feet for a while -- feels uncoordinated, weak, painful   Pain localized from mid shin (anterior and posterior), radiates down to the plantar aspect of bilateral feet.     Vascular 9/21  symptomatic right lower extremity varicose veins despite medical therapy including compression s/p stab phlebectomy  -recommend cont compression with Rx " stockings, elevation, dietary changes associated with water and sodium intake discussed at length with patient  -Exercise   -Recommend warm compresses, NSAID and elevation for thrombophlebitis prn  -RTC 6 mo    U/s -2021:  R superficial and deep venous reflux, no DVT, R GSV prox chronic thrombus, L deep and superficial vein reflux, L anterior accessory not visible.    ROS:   CONST: weight stable. EYES: no vision change. ENT: no sore throat. CV: no chest pain w/ exertion. RESP: no shortness of breath. GI: no nausea, vomiting, diarrhea. No dysphagia. : no urinary issues. MUSCULOSKELETAL: no new myalgias or arthralgias. SKIN: no new changes. NEURO: no focal deficits. PSYCH: no new issues. ENDOCRINE: no polyuria. HEME: no lymph nodes. ALLERGY: no general pruritis.      PAST MEDICAL HISTORY:                                                        1.  Diabetes.    Uncontrolled  , Possible neuropathy                                                          2.  Hyperlipidemia.  Lopid added   , Pravachol added                                                       3.  Low HDL.    4.  Erectile dysfunction   5.  Poor compliance   6.  epicondylitis     7.  Hoarseness -seen by ENT- did inhaler     8.  Superficial thrombosis and varicose veins right lower leg, seeing vascular       9.  MULTIPLE and large polyps  -ok  -5 yrs    10.  PE                                                                                                                              PAST SURGICAL HISTORY:  Vein treatment                                                                                                                             FAMILY HISTORY:  Father doing well with history of colon polyps.  Mom is      with history of breast cancer.  Three brothers and two sisters      with no known medical problems.                                                                                                                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  Quit smoking in 1991 and only had a brief      period of smoking.  He reports "moderate" amount of alcohol with no          frequency stated.     Few beers now and then     Gen: no distress  EYES: conjunctiva clear, non-icteric, PERRL  ENT: nose clear, nasal mucosa normal, oropharynx clear and moist, teeth good  NECK:supple, thyroid non-palpable  RESP: effort is good, lungs clear  CV: heart RRR w/o murmur, gallops or rubs; no carotid bruits, no edema  GI: abdomen soft, non-distended, non-tender, no hepatosplenomegaly  MS: gait normal, no clubbing or cyanosis of the digits.  Atrophy of intrinsics in both hands with intrinsic muscle weakness noted  SKIN: no rashes, warm to touch       Labs reviewed, x-ray reports reviewed, interval clinic notes all reviewed, EKG prior personally reviewed by me and interpreted as no acute signs of ischemia      Diagnoses and all orders for this visit:    Preoperative examination, low to intermediate cardiopulmonary risk.  Patient is cleared for surgery which has an apparent clear indication.  Okay to hold aspirin as needed.  Patient will document diabetic improvement which should be prompt once he resumes insulin, Trulicity and glimepiride and will continue the current metformin.  Obviously hold glimepiride the day of surgery and hold metformin 48 hours after general anesthesia.    Cervical radiculopathy    Uncontrolled type 2 diabetes mellitus with hyperglycemia, restart meds and expect improvement  -     Hemoglobin A1C; Future  -     Comprehensive Metabolic Panel; Future  -     TSH; Future  -     PSA, Screening; Future  -     Lipid Panel; Future  -     CBC Auto Differential; Future    Poor compliance    Hyperlipidemia, unspecified hyperlipidemia type, recent LDL up having run out of pravastatin, restart  -     pravastatin (PRAVACHOL) 40 MG tablet; Take 1 tablet (40 " mg total) by mouth once daily.    Diabetic polyneuropathy associated with type 2 diabetes mellitus    Venous insufficiency    Overweight (BMI 25.0-29.9)    Primary hypertension, chronic and stable, apparently not on HCTZ    Polyneuropathy    Type 2 diabetes mellitus with diabetic neuropathy, without long-term current use of insulin    History of colonic polyps, repeat 2024    Long-term insulin use    Screening for prostate cancer, overdue for PSA, labs ordered for the near future    History of pulmonary embolus (PE)    Type 2 diabetes, controlled, with neuropathy  -     insulin glargine, TOUJEO, (TOUJEO SOLOSTAR U-300 INSULIN) 300 unit/mL (1.5 mL) InPn pen; Inject 24 units once daily    Other orders  -     dulaglutide (TRULICITY) 1.5 mg/0.5 mL pen injector; Inject 1.5 mg into the skin every 7 days.  -     glimepiride (AMARYL) 2 MG tablet; Take 1 tablet (2 mg total) by mouth before breakfast.

## 2023-04-12 ENCOUNTER — TELEPHONE (OUTPATIENT)
Dept: FAMILY MEDICINE | Facility: CLINIC | Age: 58
End: 2023-04-12
Payer: COMMERCIAL

## 2023-04-12 ENCOUNTER — PATIENT MESSAGE (OUTPATIENT)
Dept: NEUROSURGERY | Facility: CLINIC | Age: 58
End: 2023-04-12
Payer: COMMERCIAL

## 2023-04-12 DIAGNOSIS — E11.65 UNCONTROLLED TYPE 2 DIABETES MELLITUS WITH HYPERGLYCEMIA: Primary | ICD-10-CM

## 2023-04-12 NOTE — TELEPHONE ENCOUNTER
Please let patient know that nurse surgery says they have you scheduled to see the surgeon on April 26th to go over results and then schedule the surgery     Since we have more time before the actual surgery, probably good that we get the A1c diabetes test done when you see the neurosurgeon.  Perhaps you could even get it done at the labs that particular day on the 20/6.      It is not fasting and Dr. Starkey will put in an order and will see how much better it gets

## 2023-04-12 NOTE — TELEPHONE ENCOUNTER
----- Message from Meme Ricks PA-C sent at 4/12/2023  8:50 AM CDT -----  Great, thanks Dr. Starkey. I have him set up to see Dr. Good on the 26th to go over EMG results and schedule surgery. I appreciate your help.     Meme       ----- Message -----  From: Sae Starkye MD  Sent: 4/11/2023   5:16 PM CDT  To: Meme Ricks PA-C    Patient cleared for any upcoming surgery as he was saying there might be a potential surgery date in the near future but he was not sure if he needed to see the neurosurgeon before.  Agree with surgery given his intrinsic atrophy and so forth.  I expect his diabetes to rapidly improve as he was off of 3/4 medications he was supposed to be on and will restart those and that should promptly get his sugar under control although checking the A1c will lag since it might take 2-3 months for that average sugar test to improve.  Patient will be checking his sugars three or 4 times per day and recording.  I do not think his A1c of 9.4 would prohibit surgery

## 2023-04-21 ENCOUNTER — PATIENT MESSAGE (OUTPATIENT)
Dept: FAMILY MEDICINE | Facility: CLINIC | Age: 58
End: 2023-04-21
Payer: COMMERCIAL

## 2023-04-21 NOTE — TELEPHONE ENCOUNTER
No new care gaps identified.  [facility] Embedded Care Gaps. Reference number: [id number]. 4/21/2023   7:48:48 AM CDT

## 2023-04-22 RX ORDER — METFORMIN HYDROCHLORIDE 1000 MG/1
TABLET ORAL
Qty: 180 TABLET | Refills: 2 | Status: SHIPPED | OUTPATIENT
Start: 2023-04-22 | End: 2023-05-15 | Stop reason: SDUPTHER

## 2023-04-25 ENCOUNTER — PROCEDURE VISIT (OUTPATIENT)
Dept: NEUROLOGY | Facility: CLINIC | Age: 58
End: 2023-04-25
Payer: COMMERCIAL

## 2023-04-25 DIAGNOSIS — E11.42 DIABETIC POLYNEUROPATHY ASSOCIATED WITH TYPE 2 DIABETES MELLITUS: ICD-10-CM

## 2023-04-25 DIAGNOSIS — G56.23 ULNAR NEUROPATHY OF BOTH UPPER EXTREMITIES: ICD-10-CM

## 2023-04-25 DIAGNOSIS — M54.12 CERVICAL RADICULOPATHY: Primary | ICD-10-CM

## 2023-04-25 PROCEDURE — 95886 PR EMG COMPLETE, W/ NERVE CONDUCTION STUDIES, 5+ MUSCLES: ICD-10-PCS | Mod: S$GLB,,, | Performed by: STUDENT IN AN ORGANIZED HEALTH CARE EDUCATION/TRAINING PROGRAM

## 2023-04-25 PROCEDURE — 95886 MUSC TEST DONE W/N TEST COMP: CPT | Mod: S$GLB,,, | Performed by: STUDENT IN AN ORGANIZED HEALTH CARE EDUCATION/TRAINING PROGRAM

## 2023-04-25 PROCEDURE — 95911 NRV CNDJ TEST 9-10 STUDIES: CPT | Mod: S$GLB,,, | Performed by: STUDENT IN AN ORGANIZED HEALTH CARE EDUCATION/TRAINING PROGRAM

## 2023-04-25 PROCEDURE — 95911 PR NERVE CONDUCTION STUDY; 9-10 STUDIES: ICD-10-PCS | Mod: S$GLB,,, | Performed by: STUDENT IN AN ORGANIZED HEALTH CARE EDUCATION/TRAINING PROGRAM

## 2023-04-25 PROCEDURE — 99214 OFFICE O/P EST MOD 30 MIN: CPT | Mod: S$GLB,,, | Performed by: STUDENT IN AN ORGANIZED HEALTH CARE EDUCATION/TRAINING PROGRAM

## 2023-04-25 PROCEDURE — 99214 PR OFFICE/OUTPT VISIT, EST, LEVL IV, 30-39 MIN: ICD-10-PCS | Mod: S$GLB,,, | Performed by: STUDENT IN AN ORGANIZED HEALTH CARE EDUCATION/TRAINING PROGRAM

## 2023-04-26 ENCOUNTER — LAB VISIT (OUTPATIENT)
Dept: LAB | Facility: HOSPITAL | Age: 58
End: 2023-04-26
Attending: INTERNAL MEDICINE
Payer: COMMERCIAL

## 2023-04-26 ENCOUNTER — OFFICE VISIT (OUTPATIENT)
Dept: NEUROSURGERY | Facility: CLINIC | Age: 58
End: 2023-04-26
Payer: COMMERCIAL

## 2023-04-26 DIAGNOSIS — M54.12 CERVICAL RADICULOPATHY: Primary | ICD-10-CM

## 2023-04-26 DIAGNOSIS — E11.65 UNCONTROLLED TYPE 2 DIABETES MELLITUS WITH HYPERGLYCEMIA: ICD-10-CM

## 2023-04-26 PROBLEM — G56.23 ULNAR NEUROPATHY OF BOTH UPPER EXTREMITIES: Status: ACTIVE | Noted: 2023-04-26

## 2023-04-26 LAB
ESTIMATED AVG GLUCOSE: 157 MG/DL (ref 68–131)
HBA1C MFR BLD: 7.1 % (ref 4–5.6)

## 2023-04-26 PROCEDURE — 99215 PR OFFICE/OUTPT VISIT, EST, LEVL V, 40-54 MIN: ICD-10-PCS | Mod: S$GLB,,, | Performed by: NEUROLOGICAL SURGERY

## 2023-04-26 PROCEDURE — 83036 HEMOGLOBIN GLYCOSYLATED A1C: CPT | Performed by: INTERNAL MEDICINE

## 2023-04-26 PROCEDURE — 99999 PR PBB SHADOW E&M-EST. PATIENT-LVL IV: ICD-10-PCS | Mod: PBBFAC,,, | Performed by: NEUROLOGICAL SURGERY

## 2023-04-26 PROCEDURE — 36415 COLL VENOUS BLD VENIPUNCTURE: CPT | Mod: PO | Performed by: INTERNAL MEDICINE

## 2023-04-26 PROCEDURE — 99999 PR PBB SHADOW E&M-EST. PATIENT-LVL IV: CPT | Mod: PBBFAC,,, | Performed by: NEUROLOGICAL SURGERY

## 2023-04-26 PROCEDURE — 99215 OFFICE O/P EST HI 40 MIN: CPT | Mod: S$GLB,,, | Performed by: NEUROLOGICAL SURGERY

## 2023-04-26 NOTE — PROGRESS NOTES
NEUROSURGICAL OUTPATIENT CONSULTATION NOTE    DATE OF SERVICE:  04/26/2023    ATTENDING PHYSICIAN:  Jc Good MD    CONSULT REQUESTED BY:        REASON FOR CONSULT:  Presurgical consultation    HISTORY OF PRESENT ILLNESS:  Abel Morris is a 57 y.o. male with h/o DVT and PE, dm, and HTN who presents with acute cervical radiculopathy.     Symptom onset:  6 weeks ago with no inciting event or trauma  Location:  Left neck with radiation into left shoulder and down left arm into the medial hand  Pain level:  4/10 today  Inciting factors:  Lifting and neck extension > flexion  Relieving factors:  Rest  Numbness:  Tingling in 4th and 5th finger on the left  Weakness:  Present in left arm  Endorses dropping items from hands.      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  He has been doing this for the past 4 months.  Recently he almost dropped a box of frozen chicken due to weakness in his left arm.     Treatments tried:  -PT:  Has not tried  -TITI:  Has not tried  -Gabapentin:  Has not tried  -Muscle relaxer:  Tried with some relief but found to be sedating and can not tolerate during the day  -Rx pain medications:  Celebrex, tramadol.  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    Medial epicondylitis 01/08/2014    Diabetes mellitus with neurological manifestations, uncontrolled 06/13/2014    Overweight (BMI 25.0-29.9) 01/10/2016    Varicose veins of lower extremities with inflammation 01/14/2016    Blood in stool 08/02/2016    History of colonic polyps 08/17/2018    Diabetic polyneuropathy associated with type 2 diabetes mellitus 08/12/2019     Colon cancer screening 09/03/2019    Polyneuropathy 09/19/2019    Refractive error 02/04/2021    Acute pulmonary embolism witho right heart strain 10/08/2021    Type 2 diabetes mellitus with diabetic neuropathy, without long-term current use of insulin 10/08/2021    Hypokalemia 10/08/2021    Primary hypertension 10/09/2021    Obesity (BMI 30.0-34.9) 10/09/2021    Cervical spondylosis 03/24/2023    DDD (degenerative disc disease), cervical 03/24/2023    Cervical radiculopathy 03/24/2023    Long-term insulin use 04/11/2023    History of pulmonary embolus (PE) 04/11/2023     Resolved Ambulatory Problems     Diagnosis Date Noted    Routine medical exam 01/08/2014    Leg weakness, bilateral 08/27/2019    Decreased strength, endurance, and mobility 08/27/2019    Gait difficulty 08/27/2019    Impairment of balance 08/27/2019    Shortness of breath 10/08/2021     Past Medical History:   Diagnosis Date    Arthritis     Eye injury        PAST SURGICAL HISTORY:  Past Surgical History:   Procedure Laterality Date    COLONOSCOPY N/A 8/2/2016    Procedure: COLONOSCOPY;  Surgeon: Miguel Persaud MD;  Location: [facility] ENDO;  Service: Endoscopy;  Laterality: N/A;    COLONOSCOPY N/A 9/3/2019    Procedure: COLONOSCOPY;  Surgeon: Álvaro Carmichael MD;  Location: [facility] ENDO;  Service: Endoscopy;  Laterality: N/A;  confirmed appt- sp    LUMBAR PUNCTURE N/A 09/19/2019       SOCIAL HISTORY:   Social History     Socioeconomic History    Marital status:    Tobacco Use    Smoking status: Former     Types: Cigars    Smokeless tobacco: Never   Substance and Sexual Activity    Alcohol use: Yes     Alcohol/week: 6.0 standard drinks     Types: 6 Cans of beer per week     Comment: per week    Drug use: No    Sexual activity: Not Currently     Social Determinants of Health     Financial Resource Strain: Medium Risk    Difficulty of Paying Living Expenses: Somewhat hard   Food Insecurity: No Food Insecurity    Worried About Running Out of Food  in the Last Year: Never true    Ran Out of Food in the Last Year: Never true   Transportation Needs: No Transportation Needs    Lack of Transportation (Medical): No    Lack of Transportation (Non-Medical): No   Physical Activity: Inactive    Days of Exercise per Week: 0 days    Minutes of Exercise per Session: 150+ min   Stress: No Stress Concern Present    Feeling of Stress : Not at all   Social Connections: Unknown    Frequency of Communication with Friends and Family: More than three times a week    Frequency of Social Gatherings with Friends and Family: Once a week    Active Member of Clubs or Organizations: No    Attends Club or Organization Meetings: Never    Marital Status:    Housing Stability: High Risk    Unable to Pay for Housing in the Last Year: Yes    Number of Places Lived in the Last Year: 1    Unstable Housing in the Last Year: No       FAMILY HISTORY:  Family History   Problem Relation Age of Onset    No Known Problems Mother     Colon polyps Father 61    No Known Problems Sister     No Known Problems Brother     No Known Problems Maternal Aunt     No Known Problems Maternal Uncle     No Known Problems Paternal Aunt     No Known Problems Paternal Uncle     Diabetes Maternal Grandmother     No Known Problems Maternal Grandfather     No Known Problems Paternal Grandmother     No Known Problems Paternal Grandfather     Amblyopia Neg Hx     Blindness Neg Hx     Cancer Neg Hx     Cataracts Neg Hx     Glaucoma Neg Hx     Hypertension Neg Hx     Macular degeneration Neg Hx     Retinal detachment Neg Hx     Strabismus Neg Hx     Stroke Neg Hx     Thyroid disease Neg Hx        CURRENTS MEDICATIONS:  Current Outpatient Medications on File Prior to Visit   Medication Sig Dispense Refill    blood sugar diagnostic Strp To check BG 2 times daily, to use with insurance preferred meter 200 each 3    celecoxib (CELEBREX) 200 MG capsule Take 1 capsule (200 mg total) by mouth 2 (two) times daily. 30 capsule 0  "   dulaglutide (TRULICITY) 1.5 mg/0.5 mL pen injector Inject 1.5 mg into the skin every 7 days. 4 pen 5    glimepiride (AMARYL) 2 MG tablet Take 1 tablet (2 mg total) by mouth before breakfast. 90 tablet 2    insulin glargine, TOUJEO, (TOUJEO SOLOSTAR U-300 INSULIN) 300 unit/mL (1.5 mL) InPn pen Inject 24 units once daily 6 pen 12    lancets Misc To check BG 2 times daily, to use with insurance preferred meter 200 each 3    metFORMIN (GLUCOPHAGE) 1000 MG tablet Take 1 tablet by mouth twice daily 180 tablet 3    metFORMIN (GLUCOPHAGE) 1000 MG tablet Take 1 tablet by mouth twice daily 180 tablet 2    multivitamin (THERAGRAN) per tablet Take 1 tablet by mouth once daily.      pen needle, diabetic (BD ULTRA-FINE SHORT PEN NEEDLE) 31 gauge x 5/16" Ndle USE  ONCE DAILY 100 each 0    pravastatin (PRAVACHOL) 40 MG tablet Take 1 tablet (40 mg total) by mouth once daily. 90 tablet 0    sildenafil (REVATIO) 20 mg Tab 1-5 pills at a time per day prn 90 tablet 1    TRUEPLUS LANCETS 33 gauge Misc USE 1 TO CHECK GLUCOSE TWICE DAILY      aspirin (ECOTRIN) 81 MG EC tablet Take 1 tablet (81 mg total) by mouth once daily.  0    blood-glucose meter kit To check BG 3 times daily, to use with insurance preferred meter 1 each 0     No current facility-administered medications on file prior to visit.       ALLERGIES:  Review of patient's allergies indicates:  No Known Allergies    REVIEW OF SYSTEMS:  ROS - per HPI    OBJECTIVE:    PHYSICAL EXAMINATION:   Sensory: intact to light touch throughout; tingling sensation to medial left hand  Motor Strength: Moves all extremities spontaneously with good tone.  Full strength upper and lower extremities. No abnormal movements seen.      Strength   Deltoids Triceps Biceps Wrist Extension Wrist Flexion Hand  FA   Upper: R 5/5 5/5 5/5 5/5 5/5 5/5 3/5     L 5/5 4-/5 5/5 5/5 5/5 5/5 3/5       Iliopsoas Quadriceps Knee  Flexion Tibialis  anterior Gastro- cnemius EHL     Lower: R 5/5 5/5 5/5 5/5 5/5 " 5/5       L 5/5 5/5 5/5 5/5 5/5 5/5        Hanson: absent     Skin: warm, dry and intact, no rashes  Gait: normal        Cervical ROM:  Decreased in flexion-extension                      Spurling's:  Positive on left  Midline Bony Tenderness:  Positive over CT junction     Diagnostic Results:  I have personally reviewed imaging and agree with the findings.      MRI cervical spine 04/03/2023   Small left foraminal disc protrusion at C6-7, possibly impinging upon the exiting left C7 nerve root, as above.    ASSESMENT:  This is a 57 y.o. male with     Problem List Items Addressed This Visit          Neuro    Cervical radiculopathy - Primary    Relevant Orders    Case Request Operating Room: DECOMPRESSION AND FUSION, SPINE, CERVICAL, ANTERIOR APPROACH (Completed)    Basic Metabolic Panel    X-Ray Chest 1 View    Protime-INR       PLAN:  Diagnosis: above    Plan: Plan anterior approach to discectomy C6/7 with artificial disc replacement. Possible fusion.  I have discussed in detail with the patient the risks, benefits, alternatives, indications, and methods of the procedure. Some of the significant risks specific to this particular procedure are: persistent pain, cerebrospinal fluid leakage, spinal instability, paralysis, numbness, nerve or spinal cord injury, bowel or bladder problems, chronic neck pain, need for additional surgery, lack of relief of current symptoms, onset of new symptoms, vascular  injury, stroke, recurrent laryngeal nerve injury, difficulty swallowing, fusion failure, instrumentation failure      Additional risks include: infection, bleeding, blood clots, heart attack, allergic reactions, pneumonia and other risks.  These risks can lead to death or permanent total disability.  Additional complications are also possible. Anesthesia also involves serious risks including, most importantly, a risk of reaction to medications, which can result in death or permanent total disability.      All questions  were answered about the procedure, alternatives and risks. No guarantees or assurances were made about the results of the procedure.     Follow-up: Plan surgery for 5/4    Jc Good MD, FAANS    Disclaimer: This note was partly generated using dictation software which may occasionally result in transcription errors.

## 2023-04-26 NOTE — PROCEDURES
"Procedures      Chief Complaint and Duration     L hand numbness, has chronicneck pain. Diabetes    History of Present Illness     Abel Morris is a 57 y.o.  male with a history of multiple medical diagnoses as listed below that presents for NCS/EMG for chronic neck pain, has muscle wasting of finger intrinsics. Diabetic polyneuorpathy. Saw neurology in past, currently seeing NSGY.     Review of patient's allergies indicates:  No Known Allergies  Current Outpatient Medications   Medication Sig Dispense Refill    aspirin (ECOTRIN) 81 MG EC tablet Take 1 tablet (81 mg total) by mouth once daily.  0    blood sugar diagnostic Strp To check BG 2 times daily, to use with insurance preferred meter 200 each 3    blood-glucose meter kit To check BG 3 times daily, to use with insurance preferred meter 1 each 0    celecoxib (CELEBREX) 200 MG capsule Take 1 capsule (200 mg total) by mouth 2 (two) times daily. 30 capsule 0    dulaglutide (TRULICITY) 1.5 mg/0.5 mL pen injector Inject 1.5 mg into the skin every 7 days. 4 pen 5    glimepiride (AMARYL) 2 MG tablet Take 1 tablet (2 mg total) by mouth before breakfast. 90 tablet 2    insulin glargine, TOUJEO, (TOUJEO SOLOSTAR U-300 INSULIN) 300 unit/mL (1.5 mL) InPn pen Inject 24 units once daily 6 pen 12    lancets Misc To check BG 2 times daily, to use with insurance preferred meter 200 each 3    metFORMIN (GLUCOPHAGE) 1000 MG tablet Take 1 tablet by mouth twice daily 180 tablet 3    metFORMIN (GLUCOPHAGE) 1000 MG tablet Take 1 tablet by mouth twice daily 180 tablet 2    multivitamin (THERAGRAN) per tablet Take 1 tablet by mouth once daily.      pen needle, diabetic (BD ULTRA-FINE SHORT PEN NEEDLE) 31 gauge x 5/16" Ndle USE  ONCE DAILY 100 each 0    pravastatin (PRAVACHOL) 40 MG tablet Take 1 tablet (40 mg total) by mouth once daily. 90 tablet 0    sildenafil (REVATIO) 20 mg Tab 1-5 pills at a time per day prn 90 tablet 1    TRUEPLUS LANCETS 33 gauge Misc USE 1 TO CHECK " GLUCOSE TWICE DAILY       No current facility-administered medications for this visit.       Medical History     Past Medical History:   Diagnosis Date    Arthritis     Diabetes mellitus type II, uncontrolled 11/28/2012    Diabetes mellitus with neurological manifestations, uncontrolled 6/13/2014    ED (erectile dysfunction) 11/28/2012    Eye injury     os hit broken orbital bone     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    History of colonic polyps 8/17/2018    Hyperlipidemia 11/28/2012    Poor compliance 1/8/2014    Primary hypertension 10/9/2021     Past Surgical History:   Procedure Laterality Date    COLONOSCOPY N/A 8/2/2016    Procedure: COLONOSCOPY;  Surgeon: Miguel Persaud MD;  Location: [facility] ENDO;  Service: Endoscopy;  Laterality: N/A;    COLONOSCOPY N/A 9/3/2019    Procedure: COLONOSCOPY;  Surgeon: Álvaro Carmichael MD;  Location: [facility] ENDO;  Service: Endoscopy;  Laterality: N/A;  confirmed appt- sp    LUMBAR PUNCTURE N/A 09/19/2019     Family History   Problem Relation Age of Onset    No Known Problems Mother     Colon polyps Father 61    No Known Problems Sister     No Known Problems Brother     No Known Problems Maternal Aunt     No Known Problems Maternal Uncle     No Known Problems Paternal Aunt     No Known Problems Paternal Uncle     Diabetes Maternal Grandmother     No Known Problems Maternal Grandfather     No Known Problems Paternal Grandmother     No Known Problems Paternal Grandfather     Amblyopia Neg Hx     Blindness Neg Hx     Cancer Neg Hx     Cataracts Neg Hx     Glaucoma Neg Hx     Hypertension Neg Hx     Macular degeneration Neg Hx     Retinal detachment Neg Hx     Strabismus Neg Hx     Stroke Neg Hx     Thyroid disease Neg Hx      Social History     Socioeconomic History    Marital status:    Tobacco Use    Smoking status: Former     Types: Cigars    Smokeless tobacco: Never   Substance and Sexual Activity    Alcohol use: Yes     Alcohol/week: 6.0 standard drinks     Types: 6 Cans  of beer per week     Comment: per week    Drug use: No    Sexual activity: Not Currently     Social Determinants of Health     Financial Resource Strain: Medium Risk    Difficulty of Paying Living Expenses: Somewhat hard   Food Insecurity: No Food Insecurity    Worried About Running Out of Food in the Last Year: Never true    Ran Out of Food in the Last Year: Never true   Transportation Needs: No Transportation Needs    Lack of Transportation (Medical): No    Lack of Transportation (Non-Medical): No   Physical Activity: Inactive    Days of Exercise per Week: 0 days    Minutes of Exercise per Session: 150+ min   Stress: No Stress Concern Present    Feeling of Stress : Not at all   Social Connections: Unknown    Frequency of Communication with Friends and Family: More than three times a week    Frequency of Social Gatherings with Friends and Family: Once a week    Active Member of Clubs or Organizations: No    Attends Club or Organization Meetings: Never    Marital Status:    Housing Stability: High Risk    Unable to Pay for Housing in the Last Year: Yes    Number of Places Lived in the Last Year: 1    Unstable Housing in the Last Year: No       Exam     There were no vitals filed for this visit.     Physical Exam:  General: he is not in acute distress. he is not ill-appearing.   HENT: Normocephalic and atraumatic. Moist mucous membranes.  Eyes: Conjunctivae normal.   Pulmonary: Pulmonary effort is normal.   Abdominal: Abdomen is soft and flat.   Skin: Skin is warm and dry. No rashes.   Psychiatric: Mood normal.        Neurologic Exam   Mental status: oriented to person, place, and time  Attention: Normal. Concentration: normal.  Speech: speech is normal.  Cranial Nerves: PERRL, EOMI intact, V1-V3 Facial sensation intact. Symmetric facies. Hearing grossly intact. Palate and uvula midline, symmetric. No tongue deviation. Trapezius strength intact.     Motor exam:  Strength 5/5 in bilateral upper extremities:  deltoids, biceps, triceps, wrist flexion/extension, 4/5 finger abduction/adduction  Has muscle wasting of finger intrinsics    Reflexes: 2+ in bilateral upper extremities: biceps and brachiaradialis    Sensory exam: light touch intact    Gait exam: normal  Coordination: normal    Tremor: none    Labs and Imaging     Imaging: reviewed  Mri cervical spine 4/3/23 personally reviewed - multilevel disc changes, no significant canal stenosis. Has small L foraminal disc protrusion C6-C7    Assessment and Plan     Problem List Items Addressed This Visit          Neuro    Diabetic polyneuropathy associated with type 2 diabetes mellitus    Cervical radiculopathy - Primary    Ulnar neuropathy of both upper extremities     57 year old male. Cervical disc changes. NCS/EMG showing no blocking of ulnar nerve peripherally, does show chronic denervation in neck.   On exam today, patient w finger intrinsic weakness, concern for cervical disc changes that may be affecting weakness. Patient well compensating despite level of muscle atrophy noted on exam today. NSGY to consider taking patient to OR.     Follow-up: No follow-ups on file.

## 2023-04-27 ENCOUNTER — TELEPHONE (OUTPATIENT)
Dept: ORTHOPEDICS | Facility: CLINIC | Age: 58
End: 2023-04-27
Payer: COMMERCIAL

## 2023-04-27 ENCOUNTER — APPOINTMENT (OUTPATIENT)
Dept: RADIOLOGY | Facility: HOSPITAL | Age: 58
End: 2023-04-27
Attending: NEUROLOGICAL SURGERY
Payer: COMMERCIAL

## 2023-04-27 DIAGNOSIS — M54.12 CERVICAL RADICULOPATHY: ICD-10-CM

## 2023-04-27 PROCEDURE — 71045 XR CHEST 1 VIEW: ICD-10-PCS | Mod: 26,,, | Performed by: RADIOLOGY

## 2023-04-27 PROCEDURE — 71045 X-RAY EXAM CHEST 1 VIEW: CPT | Mod: 26,,, | Performed by: RADIOLOGY

## 2023-04-27 PROCEDURE — 71045 X-RAY EXAM CHEST 1 VIEW: CPT | Mod: TC,FY,PN

## 2023-04-27 NOTE — TELEPHONE ENCOUNTER
Incoming message from [facility] dept.  Pt has [employer] plan and needs to be routed through [facility].    Contacted Mr. Morris and explained he will not have surgery on 5/4 as currently scheduled.  Will need to use [facility] provider.  I gave him the number to call [facility] [employer] and get a referral started.  I gave him a brief run down of the program, financial benefit, and 2 visit model.  I told him to call [facility] today and to expect a call from Larry rodriguez, and nurse, Vale on Monday or Tuesday of next week.

## 2023-05-02 ENCOUNTER — PATIENT MESSAGE (OUTPATIENT)
Dept: NEUROSURGERY | Facility: CLINIC | Age: 58
End: 2023-05-02
Payer: COMMERCIAL

## 2023-05-02 ENCOUNTER — PATIENT MESSAGE (OUTPATIENT)
Dept: SURGERY | Facility: HOSPITAL | Age: 58
End: 2023-05-02
Payer: COMMERCIAL

## 2023-05-09 ENCOUNTER — TELEPHONE (OUTPATIENT)
Dept: NEUROSURGERY | Facility: CLINIC | Age: 58
End: 2023-05-09
Payer: COMMERCIAL

## 2023-05-09 NOTE — TELEPHONE ENCOUNTER
"Spoke to patient on: 5/9/23    Patient works at [employer] in [place], [place]  Are you currently working? :  NO- out on leave- since March 5 with restrictions- Dr.Stephanie Hamilton signed paperwork  Are you on workers comp?No   Are you involved in any ligation at this time? No   Do you have HSA insurance? No                Have support to help with care and appointments: Yes   Travel Caregiver:         What is your chief complaint? feeling excruciating pain neck- numbness arm left side- pinky finger- look up and down shooting pain    How long has it been going on? since end of February (March)    Have you had pain like this before?No     Have you sought treatment for this condition in the past?No   If yes, what treatments did you then?  If yes, when did those stop working?    Where is the pain the worst?     Does the pain radiate? neck down to arms- weak    Where else do you hurt? neck and arms    Is the pain constant or does it go away to a 0/10 at times even if the pain comes right back? 6-7    What makes the pain worse? sit too long- have to move arm- if stand too long    What makes the pain better or decreases the pain (which position is least uncomfortable)? pain pill- but it knocks me out    Any other symptoms? left arm and hand weakness- both hands are weak - deterioating- can hold a coffee cup but sometimes struggling with buttong- and turning keys- and has to     Any bowel or bladder incontinence? no  Or changes?    What treatments have you tried for your current pain, and did they help?   Physical therapy? no   Chiropractor?   Injections? What kinds - no- wanted to do steroid injection- but AIC was out of control   Prior back surgery- NO   Medication? pain meds help      BMI: 27.4  Ht: 6'5"  Weight: 231 pounds    AIC: 7.1     Social Hx:     -  Nicotine no   - Alcohol no on occasional   - Other substances no              Allergies:   Review of patient's allergies indicates:  No Known Allergies    Medication " "list:   Current Outpatient Medications on File Prior to Visit   Medication Sig Dispense Refill    aspirin (ECOTRIN) 81 MG EC tablet Take 1 tablet (81 mg total) by mouth once daily.  0    blood sugar diagnostic Strp To check BG 2 times daily, to use with insurance preferred meter 200 each 3    blood-glucose meter kit To check BG 3 times daily, to use with insurance preferred meter 1 each 0    celecoxib (CELEBREX) 200 MG capsule Take 1 capsule (200 mg total) by mouth 2 (two) times daily. 30 capsule 0    dulaglutide (TRULICITY) 1.5 mg/0.5 mL pen injector Inject 1.5 mg into the skin every 7 days. 4 pen 5    glimepiride (AMARYL) 2 MG tablet Take 1 tablet (2 mg total) by mouth before breakfast. 90 tablet 2    insulin glargine, TOUJEO, (TOUJEO SOLOSTAR U-300 INSULIN) 300 unit/mL (1.5 mL) InPn pen Inject 24 units once daily 6 pen 12    lancets Misc To check BG 2 times daily, to use with insurance preferred meter 200 each 3    metFORMIN (GLUCOPHAGE) 1000 MG tablet Take 1 tablet by mouth twice daily 180 tablet 3    metFORMIN (GLUCOPHAGE) 1000 MG tablet Take 1 tablet by mouth twice daily 180 tablet 2    multivitamin (THERAGRAN) per tablet Take 1 tablet by mouth once daily.      pen needle, diabetic (BD ULTRA-FINE SHORT PEN NEEDLE) 31 gauge x 5/16" Ndle USE  ONCE DAILY 100 each 0    pravastatin (PRAVACHOL) 40 MG tablet Take 1 tablet (40 mg total) by mouth once daily. 90 tablet 0    sildenafil (REVATIO) 20 mg Tab 1-5 pills at a time per day prn 90 tablet 1    TRUEPLUS LANCETS 33 gauge Misc USE 1 TO CHECK GLUCOSE TWICE DAILY       No current facility-administered medications on file prior to visit.         Covid-19:   - vaccine: yes   - diagnosed with: no      Health Hx: see epic    Surgical Hx: see epic    Can you take one flight of stairs: yes    RCRI:   - Coronary Artery Disease: no   - Congestive Heart Failure: no   - Cerebrovascular Disease: no   - Diabetes Mellitus on insulin: yes        [place]   - S  snore loudly yes   - " T  tired during the day no   - O  stop breathing in sleep no   - P  BP/HTN no   - B  BMI    - A  over 50 Yes   - N  neck size   - G  male gender Yes    Images Received:  MRI Yes    Type:  Xray Yes   Type:  EMG  Yes       Surgery recommended: Yes patient was scheduled with Dr. Jc Good on 5/4 for ACD-    PCP Information:  Dr. Starkey- already cleared for surgery- see EPIC notes    Spoke with pt, reviewed history, discussed timeframe and expectation regarding the [facility]ine program, confirmed PCP. Explained I would be sending imaging, chart review and spine screen assessment to Dr. Gutierrez.  Explained that initial trip is an evaluation visit and if surgery needed and determined a surgery date would be determined with surgeon and patient. From there, I would send preop orders to PCP for them to complete at home.   Patient verbalized understanding of the above and instructed to reach out with any questions or concerns. Direct phone # given and explained the use of patient portal communication.    Patient declined [facility]- patient will see Dr. Gutierrez in [facility] on 5/15.    Vale De La Torre, RN, CMSRN  RN Navigator  [facility]

## 2023-05-15 ENCOUNTER — OFFICE VISIT (OUTPATIENT)
Dept: SPINE | Facility: CLINIC | Age: 58
End: 2023-05-15
Payer: COMMERCIAL

## 2023-05-15 ENCOUNTER — TELEPHONE (OUTPATIENT)
Dept: PAIN MEDICINE | Facility: OTHER | Age: 58
End: 2023-05-15
Payer: COMMERCIAL

## 2023-05-15 ENCOUNTER — TELEPHONE (OUTPATIENT)
Dept: NEUROSURGERY | Facility: CLINIC | Age: 58
End: 2023-05-15
Payer: COMMERCIAL

## 2023-05-15 VITALS
WEIGHT: 230.19 LBS | BODY MASS INDEX: 28.03 KG/M2 | HEART RATE: 83 BPM | SYSTOLIC BLOOD PRESSURE: 135 MMHG | TEMPERATURE: 98 F | HEIGHT: 76 IN | DIASTOLIC BLOOD PRESSURE: 74 MMHG

## 2023-05-15 DIAGNOSIS — G95.9 CERVICAL MYELOPATHY: ICD-10-CM

## 2023-05-15 DIAGNOSIS — M50.30 DDD (DEGENERATIVE DISC DISEASE), CERVICAL: ICD-10-CM

## 2023-05-15 DIAGNOSIS — M54.12 CERVICAL RADICULOPATHY: Primary | ICD-10-CM

## 2023-05-15 DIAGNOSIS — R29.898 WEAKNESS OF BOTH HANDS: Primary | ICD-10-CM

## 2023-05-15 PROCEDURE — 99999 PR PBB SHADOW E&M-EST. PATIENT-LVL IV: ICD-10-PCS | Mod: PBBFAC,COE,, | Performed by: ANESTHESIOLOGY

## 2023-05-15 PROCEDURE — 99999 PR PBB SHADOW E&M-EST. PATIENT-LVL IV: CPT | Mod: PBBFAC,COE,, | Performed by: ANESTHESIOLOGY

## 2023-05-15 PROCEDURE — 99214 PR OFFICE/OUTPT VISIT, EST, LEVL IV, 30-39 MIN: ICD-10-PCS | Mod: S$GLB,COE,, | Performed by: ANESTHESIOLOGY

## 2023-05-15 PROCEDURE — 99214 OFFICE O/P EST MOD 30 MIN: CPT | Mod: S$GLB,COE,, | Performed by: ANESTHESIOLOGY

## 2023-05-15 PROCEDURE — 99214 PR OFFICE/OUTPT VISIT, EST, LEVL IV, 30-39 MIN: ICD-10-PCS | Mod: S$GLB,COE,, | Performed by: NEUROLOGICAL SURGERY

## 2023-05-15 PROCEDURE — 99214 OFFICE O/P EST MOD 30 MIN: CPT | Mod: S$GLB,COE,, | Performed by: NEUROLOGICAL SURGERY

## 2023-05-15 PROCEDURE — 99999 PR PBB SHADOW E&M-EST. PATIENT-LVL II: ICD-10-PCS | Mod: PBBFAC,COE,, | Performed by: NEUROLOGICAL SURGERY

## 2023-05-15 PROCEDURE — 99999 PR PBB SHADOW E&M-EST. PATIENT-LVL II: CPT | Mod: PBBFAC,COE,, | Performed by: NEUROLOGICAL SURGERY

## 2023-05-15 NOTE — H&P (VIEW-ONLY)
PCP: Sae Starkey MD    REFERRING PHYSICIAN: No ref. provider found    CHIEF COMPLAINT: Pain in the neck that radiates to the left arm    Original HISTORY OF PRESENT ILLNESS: Abel Morris presents to the clinic for the evaluation of the above pain. The pain started 2 months ago.     Original Pain Description:  The pain is located in the left neck and radiates to the left 4th and 5th fingers. The pain is described as stabbing, tingling. Exacerbating factors: twisting, looking down. Mitigating factors sleeping. Symptoms interfere with daily activity. The patient feels like symptoms have been unchanged. Patient denies night fever/night sweats, urinary incontinence, bowel incontinence, and significant weight loss. He does note some weakness in the left arm.     Original PAIN SCORES:  Best: Pain is 2  Worst: Pain is 8  Current: Pain is 4    INTERVAL HISTORY:     6 weeks of Conservative therapy:(Must include dates)   PT: No  Chiro: No  HEP: No      Treatments / Medications: (Ice/Heat/NSAIDS/APAP/etc):  Ibuprofen  APAP  Celebrex  Tramadol    Interventional Pain Procedures: (Previous injections)  None    Past Medical History:   Diagnosis Date    Arthritis     Diabetes mellitus type II, uncontrolled 11/28/2012    Diabetes mellitus with neurological manifestations, uncontrolled 6/13/2014    ED (erectile dysfunction) 11/28/2012    Eye injury     os hit broken orbital bone     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    History of colonic polyps 8/17/2018    Hyperlipidemia 11/28/2012    Poor compliance 1/8/2014    Primary hypertension 10/9/2021     Past Surgical History:   Procedure Laterality Date    COLONOSCOPY N/A 8/2/2016    Procedure: COLONOSCOPY;  Surgeon: Miguel Persaud MD;  Location: [facility] [place] ENDO;  Service: Endoscopy;  Laterality: N/A;    COLONOSCOPY N/A 9/3/2019    Procedure: COLONOSCOPY;  Surgeon: Álvaro Carmichael MD;  Location: [facility] [place] ENDO;  Service: Endoscopy;  Laterality: N/A;  confirmed appt- sp    LUMBAR  DEVONTE N/A 09/19/2019     Social History     Socioeconomic History    Marital status:    Tobacco Use    Smoking status: Former     Types: Cigars    Smokeless tobacco: Never   Substance and Sexual Activity    Alcohol use: Yes     Alcohol/week: 6.0 standard drinks     Types: 6 Cans of beer per week     Comment: per week    Drug use: No    Sexual activity: Not Currently     Social Determinants of Health     Financial Resource Strain: Medium Risk    Difficulty of Paying Living Expenses: Somewhat hard   Food Insecurity: No Food Insecurity    Worried About Running Out of Food in the Last Year: Never true    Ran Out of Food in the Last Year: Never true   Transportation Needs: No Transportation Needs    Lack of Transportation (Medical): No    Lack of Transportation (Non-Medical): No   Physical Activity: Inactive    Days of Exercise per Week: 0 days    Minutes of Exercise per Session: 150+ min   Stress: No Stress Concern Present    Feeling of Stress : Not at all   Social Connections: Unknown    Frequency of Communication with Friends and Family: More than three times a week    Frequency of Social Gatherings with Friends and Family: Once a week    Active Member of Clubs or Organizations: No    Attends Club or Organization Meetings: Never    Marital Status:    Housing Stability: High Risk    Unable to Pay for Housing in the Last Year: Yes    Number of Places Lived in the Last Year: 1    Unstable Housing in the Last Year: No     Family History   Problem Relation Age of Onset    No Known Problems Mother     Colon polyps Father 61    No Known Problems Sister     No Known Problems Brother     No Known Problems Maternal Aunt     No Known Problems Maternal Uncle     No Known Problems Paternal Aunt     No Known Problems Paternal Uncle     Diabetes Maternal Grandmother     No Known Problems Maternal Grandfather     No Known Problems Paternal Grandmother     No Known Problems Paternal Grandfather     Amblyopia Neg Hx   "   Blindness Neg Hx     Cancer Neg Hx     Cataracts Neg Hx     Glaucoma Neg Hx     Hypertension Neg Hx     Macular degeneration Neg Hx     Retinal detachment Neg Hx     Strabismus Neg Hx     Stroke Neg Hx     Thyroid disease Neg Hx        Review of patient's allergies indicates:  No Known Allergies    Current Outpatient Medications   Medication Sig    aspirin (ECOTRIN) 81 MG EC tablet Take 1 tablet (81 mg total) by mouth once daily.    blood sugar diagnostic Strp To check BG 2 times daily, to use with insurance preferred meter    celecoxib (CELEBREX) 200 MG capsule Take 1 capsule (200 mg total) by mouth 2 (two) times daily.    dulaglutide (TRULICITY) 1.5 mg/0.5 mL pen injector Inject 1.5 mg into the skin every 7 days.    glimepiride (AMARYL) 2 MG tablet Take 1 tablet (2 mg total) by mouth before breakfast.    insulin glargine, TOUJEO, (TOUJEO SOLOSTAR U-300 INSULIN) 300 unit/mL (1.5 mL) InPn pen Inject 24 units once daily    lancets Misc To check BG 2 times daily, to use with insurance preferred meter    metFORMIN (GLUCOPHAGE) 1000 MG tablet Take 1 tablet by mouth twice daily    multivitamin (THERAGRAN) per tablet Take 1 tablet by mouth once daily.    pen needle, diabetic (BD ULTRA-FINE SHORT PEN NEEDLE) 31 gauge x 5/16" Ndle USE  ONCE DAILY    pravastatin (PRAVACHOL) 40 MG tablet Take 1 tablet (40 mg total) by mouth once daily.    sildenafil (REVATIO) 20 mg Tab 1-5 pills at a time per day prn    TRUEPLUS LANCETS 33 gauge Misc USE 1 TO CHECK GLUCOSE TWICE DAILY    blood-glucose meter kit To check BG 3 times daily, to use with insurance preferred meter    metFORMIN (GLUCOPHAGE) 1000 MG tablet Take 1 tablet by mouth twice daily     No current facility-administered medications for this visit.       ROS:  GENERAL: No fever. No chills. No fatigue. Denies weight loss. Denies weight gain.  HEENT: Denies headaches. Denies vision change. Denies eye pain. Denies double vision. Denies ear pain.   CV: Denies chest pain.   PULM: " "Denies of shortness of breath.  GI: Denies constipation. No diarrhea. No abdominal pain. Denies nausea. Denies vomiting. No blood in stool.  HEME: Denies bleeding problems.  : Denies urgency. No painful urination. No blood in urine.  MS: Denies joint stiffness. Denies joint swelling.  + Neck pain.  SKIN: Denies rash.   NEURO: Denies seizures. No weakness.  PSYCH:  Denies difficulty sleeping. No anxiety. Denies depression. No suicidal thoughts.       VITALS:   Vitals:    05/15/23 0955   BP: 135/74   Pulse: 83   Temp: 98.4 °F (36.9 °C)   Weight: 104.4 kg (230 lb 2.6 oz)   Height: 6' 4" (1.93 m)   PainSc:   4   PainLoc: Neck         PHYSICAL EXAM:   GENERAL: Well appearing, in no acute distress, alert and oriented x3.  PSYCH:  Mood and affect appropriate.  SKIN: Skin color, texture, turgor normal, no rashes or lesions.  HEENT:  Normocephalic, atraumatic. Cranial nerves grossly intact.  NECK: Pain with ROM, especially left. Radicular symptoms with leaning head to the left. Pain to palpation of left rhomboid region.   PULM: No evidence of respiratory difficulty, symmetric chest rise.  GI:  Non-distended  BACK: Normal range of motion.   EXTREMITIES: No deformities, edema, or skin discoloration.   MUSCULOSKELETAL: Shoulder, hip, and knee provocative maneuvers are negative.Hypothenar wasting.   NEURO: Sensation is equal and appropriate bilaterally. Decreased strength in bilateral interossei only. Plantar response are downgoing.    GAIT: normal.      LABS:      IMAGING:    MRI CERVICAL SPINE WITHOUT CONTRAST     CLINICAL HISTORY:  cervical radiculopathy with weakness in LUE; Radiculopathy, cervical region     TECHNIQUE:  Routine multiplanar, multisequence MR cervical spine protocol performed without the administration of contrast.     COMPARISON:  09/13/2019     FINDINGS:  The craniocervical junction is unremarkable.The cervical cord demonstrates normal size and signal.     No evidence of fracture, marrow replacement " process, or spondylo-discitis.     No paraspinal masses or inflammatory changes.     Degenerative changes/ spondylosis:     At C3-4, there ismild broad-based posterior disc osteophyte complex with small central disc protrusion abutting the cord and mild facet joint arthropathy, contributing to mild bilateral neural foraminal narrowing.  No spinal canal stenosis.     At C4-5, there isbroad-based posterior disc osteophyte complex with a small central disc protrusion.  No spinal canal stenosis or significant neural foraminal narrowing.     At C5-6, there ismild broad-based posterior disc osteophyte complex.  No spinal canal stenosis or significant neural foraminal narrowing.     At C6-7, there allison small left foraminal disc protrusion (series 3, image 8), possibly impinging upon the exiting left C7 nerve root.  No spinal canal stenosis.  There is mild uncovertebral spurring.     At C7-T1, there ismild facet joint arthropathy.  No spinal canal stenosis or significant neural foraminal narrowing.     Impression:     Small left foraminal disc protrusion at C6-7, possibly impinging upon the exiting left C7 nerve root, as above.        Electronically signed by: Jorge Leon MD  Date:                                            04/04/2023  Time:                                           08:39    ASSESSMENT: 57 y.o. year old male with pain, consistent with:    Encounter Diagnoses   Name Primary?    Cervical radiculopathy Yes    DDD (degenerative disc disease), cervical        DISCUSSION: [facility]. Morris [facility]. He was previously worked up by Dr. Mary for cervical radiculopathy. MRI shows a C6/7 foraminal protrusion with possible impingement on the left C7 nerve root. He reports not being able to carry heavy things with that arm, but there is no obvious unilateral weakness on exam. He does have interosseous weakness bilaterally. Exam does indicate pain with ROM of the neck and  reproduction of symptoms when he leans his head left but does not explain his interossei weakness.     PLAN:  Discussed conservative measures   Recommend JUAN PABLO and Gabapentin  Continue Celecoxib   Dr. Gutierrez concerned about ALS - referred him to Neurology  Recommend follow up with Dr. Mary or myself    Allie Torres  05/15/2023

## 2023-05-15 NOTE — PROGRESS NOTES
Dear Dr. Good, Jc BARRON MD,    Thank you for referring this patient to my clinic. The full details of my evaluation will also be forthcoming to you by letter.    CHIEF COMPLAINT:  Consultation for RUE pain and numbness.    I, Dwayne Zapata, attest that this documentation has been prepared under the direction and in the presence of Kp Gutierrez MD.    HPI:  Abel Morris is a 57 y.o.  male with h/o DVT and PE, DM, and HTN, who is referred to me by Jc Good MD, for evaluation of left neck pain radiating into the left arm with numbness and weakness. He is compliant with ASA 81 mg. His x-ray cervical spine dated 03/24/2023 showed no instability on flex and ext. His MRI cervical spine, dated 04/03/2023 shows very small disc bulge at left C6-7, diffuse spondylosis without significant stenosis, bilateral cervical nerve roots not compressed. EMG dated 04/25/2023: chronic innervation. Patient was scheduled for cervical ACD with Dr Good on 05/04/2023, but procedure was canceled.    Today the patient reports significant pain and numbness in his neck, numbness to LUE and pinky. He also reports weakness to his bilateral upper extremities. He endorses his pain radiates from his neck to his left upper extremity up to the wrist. He notes his neck pain exacerbates when looking up and down. He reports he sometimes struggles with buttoning or turning keys, but can hold cup of coffee. He also reports he has not dropped things with his hand, but he almost did in one instance. He states his PCP, Dr Starkey, stated some loss of muscle mass in his left arm. Denies issues with balance, b/b dysfunction, cognitive deficits. He notes no treatment with TITI injections.           Review of patient's allergies indicates:  No Known Allergies    Past Medical History:   Diagnosis Date    Arthritis     Diabetes mellitus type II, uncontrolled 11/28/2012    Diabetes mellitus with neurological manifestations, uncontrolled 6/13/2014     ED (erectile dysfunction) 11/28/2012    Eye injury     os hit broken orbital bone     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    History of colonic polyps 8/17/2018    Hyperlipidemia 11/28/2012    Poor compliance 1/8/2014    Primary hypertension 10/9/2021     Past Surgical History:   Procedure Laterality Date    COLONOSCOPY N/A 8/2/2016    Procedure: COLONOSCOPY;  Surgeon: Migule Persaud MD;  Location: [facility] ENDO;  Service: Endoscopy;  Laterality: N/A;    COLONOSCOPY N/A 9/3/2019    Procedure: COLONOSCOPY;  Surgeon: Álvaro Carmichael MD;  Location: [facility] ENDO;  Service: Endoscopy;  Laterality: N/A;  confirmed appt- sp    LUMBAR PUNCTURE N/A 09/19/2019     Family History   Problem Relation Age of Onset    No Known Problems Mother     Colon polyps Father 61    No Known Problems Sister     No Known Problems Brother     No Known Problems Maternal Aunt     No Known Problems Maternal Uncle     No Known Problems Paternal Aunt     No Known Problems Paternal Uncle     Diabetes Maternal Grandmother     No Known Problems Maternal Grandfather     No Known Problems Paternal Grandmother     No Known Problems Paternal Grandfather     Amblyopia Neg Hx     Blindness Neg Hx     Cancer Neg Hx     Cataracts Neg Hx     Glaucoma Neg Hx     Hypertension Neg Hx     Macular degeneration Neg Hx     Retinal detachment Neg Hx     Strabismus Neg Hx     Stroke Neg Hx     Thyroid disease Neg Hx      Social History     Tobacco Use    Smoking status: Former     Types: Cigars    Smokeless tobacco: Never   Substance Use Topics    Alcohol use: Yes     Alcohol/week: 6.0 standard drinks     Types: 6 Cans of beer per week     Comment: per week    Drug use: No        Review of Systems   Constitutional: Negative.    HENT: Negative.     Eyes: Negative.    Respiratory: Negative.     Cardiovascular: Negative.    Gastrointestinal: Negative.    Endocrine: Negative.    Genitourinary: Negative.    Musculoskeletal:  Positive for myalgias (RUE) and neck pain. Negative  for back pain and gait problem.   Skin: Negative.    Allergic/Immunologic: Negative.    Neurological:  Positive for weakness (BUE) and numbness (RUE). Negative for light-headedness and headaches.   Hematological: Negative.    Psychiatric/Behavioral: Negative.       OBJECTIVE:   Vital Signs:       Physical Exam:    Vital signs: All nursing notes and vital signs reviewed -- afebrile, vital signs stable.  Constitutional: Patient sitting comfortably in chair. Appears well developed and well nourished.  Skin: Exposed areas are intact without abnormal markings, rashes or other lesions.  HEENT: Normocephalic. Normal conjunctivae. No tongue fasciculations.  Cardiovascular: Normal rate and regular rhythm.  Respiratory: Chest wall rises and falls symmetrically, without signs of respiratory distress.  Abdomen: Soft and non-tender.  Extremities: Warm and without edema. Calves supple, non-tender.  Psych/Behavior: Normal affect.  Musculoskeletal:  Significant atrophy of both thenar muscles, and less prominent atrophy on bilateral hypothenar muscles. Mild clawing of the 4th and 5th digit of L and R Hand.    Neurological:    Mental status: Alert and oriented. Conversational and appropriate.       Cranial Nerves: VFF to confrontation. PERRL. EOMI without nystagmus. Facial STLT normal and symmetric. Strong, symmetric muscles of mastication. Facial strength full and symmetric. Hearing equal bilaterally to finger rub. Palate and uvula rise and fall normally in midline. Shoulder shrug 5/5 strength. Tongue midline.     Motor:    Upper:  Deltoids Triceps Biceps WE WF     R 5/5 5/5 5/5 4+/5 4+/5 4/5    L 5/5 5/5 5/5 4/5 4/5 4/5      Lower:  HF KE KF DF PF EHL    R 5/5 5/5 5/5 5/5 5/5 5/5    L 5/5 5/5 5/5 5/5 5/5 5/5     Sensory: Intact sensation to light touch in all extremities. Romberg negative.    Reflexes:          DTR: 2+ symmetrically throughout.     Hanson's: Negative.     Babinski's: Negative.     Clonus:  "Negative.    Cerebellar: Finger-to-nose and rapid alternating movements normal. Gait stable, fluid.    Spine:    Posture: Head well aligned over pelvis in front and side views.  No focal or global spinal deformity visible on inspection. Shoulders and hips even. No obvious leg length discrepancy. No scapula winging.    Bending: Full ROM with forward, back and lateral bending. No rib prominence with forward bend.    Cervical:      ROM: Full with flexion, extension, lateral rotation and ear-to-shoulder bend.      Midline TTP: Negative.     Spurling's test: Negative.     Lhermitte's: Negative.    Thoracic:     Midline TTP: Negative    Lumbar:     Midline TTP: Negative     Straight Leg Test: Negative     Crossed Straight Leg Test: Negative     Sciatic notch tenderness: Negative.    Other:     SI joint TTP: Negative.     Greater trochanter TTP: Negative.     Tenderness with external/internal hip rotation: Negative.    Diagnostic Results:  All imaging was independently reviewed by me.    X-ray cervical spine dated 03/24/2023:  No instability on flex and ext.     MRI cervical spine, dated 04/03/2023:  Very small disc bulge at left C6-7.  Diffuse spondylosis without significant stenosis.  Bilateral cervical nerve roots not compressed.     EMG dated 04/25/2023:  "Chronic denervation"    ASSESSMENT/PLAN:     Abel Morris has profound atrophy, weakness, clawing and clumsiness of bilateral hands which has progressed slowly over the past two years. He also has significant neck pain and shooting left arm pain in a C7 distribution. I think that his neck and shooting arm pain may be coming from his disc bulge at left C6-7, and for that I'd recommend an TITI for now. I'm more concerned about his painless weakness and atrophy of bilateral upper extremities which are unrelated to C6-7. In fact I'm not seeing a good explanation of these symptoms anywhere on current imaging.     I recommend referral to Dr. Amos of Neurology. " ALS would be on my differential. I'm ordering new brain and C spine imaging. I would appreciate a clarification on the findings of the EMG that he's had because it is vaguely worded vs. a repeat study. He will RTC with me pending above.    The patient understands and agrees with the plan of care. All questions were answered.     1. MRI brain and C spine +/- richard  2. Referral to Dr. Amos of Neurology for evaluation, +/- repeat EMG  3. C6-7 TITI  4. RTC pending #1-3      I, Dr. Kp Gutierrez personally performed the services described in this documentation. All medical record entries made by the scribe, Dwayne Zapata, were at my direction and in my presence.  I have reviewed the chart and agree that the record reflects my personal performance and is accurate and complete.      Kp Gutierrez M.D.  Department of Neurosurgery  [facility]

## 2023-05-15 NOTE — TELEPHONE ENCOUNTER
----- Message from Vale De La Torre RN sent at 5/15/2023 11:31 AM CDT -----  Regarding: [facility] patient- please schedule tomorrow anytime 5/16  [facility] patient saw dr. Torres today.    Epidural Injection (specify level) Comment - C7/T1 (Left sided) - CRISTIAN Pt. Schedule for tomorrow.      Please let me know arrival time.    Thanks,  Vale De La Torre, RN, CMSRN  RN Navigator  [facility]

## 2023-05-15 NOTE — TELEPHONE ENCOUNTER
called patient with arrival time for pain injection time of 11:15am tomorrow. patient verbalized understanding.    Vale De La Torre, RN, CMSRN  RN Navigator  [facility]

## 2023-05-15 NOTE — PROGRESS NOTES
PCP: Sae Starkey MD    REFERRING PHYSICIAN: No ref. provider found    CHIEF COMPLAINT: Pain in the neck that radiates to the left arm    Original HISTORY OF PRESENT ILLNESS: Abel Morris presents to the clinic for the evaluation of the above pain. The pain started 2 months ago.     Original Pain Description:  The pain is located in the left neck and radiates to the left 4th and 5th fingers. The pain is described as stabbing, tingling. Exacerbating factors: twisting, looking down. Mitigating factors sleeping. Symptoms interfere with daily activity. The patient feels like symptoms have been unchanged. Patient denies night fever/night sweats, urinary incontinence, bowel incontinence, and significant weight loss. He does note some weakness in the left arm.     Original PAIN SCORES:  Best: Pain is 2  Worst: Pain is 8  Current: Pain is 4    INTERVAL HISTORY:     6 weeks of Conservative therapy:(Must include dates)   PT: No  Chiro: No  HEP: No      Treatments / Medications: (Ice/Heat/NSAIDS/APAP/etc):  Ibuprofen  APAP  Celebrex  Tramadol    Interventional Pain Procedures: (Previous injections)  None    Past Medical History:   Diagnosis Date    Arthritis     Diabetes mellitus type II, uncontrolled 11/28/2012    Diabetes mellitus with neurological manifestations, uncontrolled 6/13/2014    ED (erectile dysfunction) 11/28/2012    Eye injury     os hit broken orbital bone     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    History of colonic polyps 8/17/2018    Hyperlipidemia 11/28/2012    Poor compliance 1/8/2014    Primary hypertension 10/9/2021     Past Surgical History:   Procedure Laterality Date    COLONOSCOPY N/A 8/2/2016    Procedure: COLONOSCOPY;  Surgeon: Miguel Persaud MD;  Location: [facility];  Service: Endoscopy;  Laterality: N/A;    COLONOSCOPY N/A 9/3/2019    Procedure: COLONOSCOPY;  Surgeon: Álvaro Carmichael MD;  Location: [facility];  Service: Endoscopy;  Laterality: N/A;  confirmed appt- sp    LUMBAR  DEVONTE N/A 09/19/2019     Social History     Socioeconomic History    Marital status:    Tobacco Use    Smoking status: Former     Types: Cigars    Smokeless tobacco: Never   Substance and Sexual Activity    Alcohol use: Yes     Alcohol/week: 6.0 standard drinks     Types: 6 Cans of beer per week     Comment: per week    Drug use: No    Sexual activity: Not Currently     Social Determinants of Health     Financial Resource Strain: Medium Risk    Difficulty of Paying Living Expenses: Somewhat hard   Food Insecurity: No Food Insecurity    Worried About Running Out of Food in the Last Year: Never true    Ran Out of Food in the Last Year: Never true   Transportation Needs: No Transportation Needs    Lack of Transportation (Medical): No    Lack of Transportation (Non-Medical): No   Physical Activity: Inactive    Days of Exercise per Week: 0 days    Minutes of Exercise per Session: 150+ min   Stress: No Stress Concern Present    Feeling of Stress : Not at all   Social Connections: Unknown    Frequency of Communication with Friends and Family: More than three times a week    Frequency of Social Gatherings with Friends and Family: Once a week    Active Member of Clubs or Organizations: No    Attends Club or Organization Meetings: Never    Marital Status:    Housing Stability: High Risk    Unable to Pay for Housing in the Last Year: Yes    Number of Places Lived in the Last Year: 1    Unstable Housing in the Last Year: No     Family History   Problem Relation Age of Onset    No Known Problems Mother     Colon polyps Father 61    No Known Problems Sister     No Known Problems Brother     No Known Problems Maternal Aunt     No Known Problems Maternal Uncle     No Known Problems Paternal Aunt     No Known Problems Paternal Uncle     Diabetes Maternal Grandmother     No Known Problems Maternal Grandfather     No Known Problems Paternal Grandmother     No Known Problems Paternal Grandfather     Amblyopia Neg Hx   "   Blindness Neg Hx     Cancer Neg Hx     Cataracts Neg Hx     Glaucoma Neg Hx     Hypertension Neg Hx     Macular degeneration Neg Hx     Retinal detachment Neg Hx     Strabismus Neg Hx     Stroke Neg Hx     Thyroid disease Neg Hx        Review of patient's allergies indicates:  No Known Allergies    Current Outpatient Medications   Medication Sig    aspirin (ECOTRIN) 81 MG EC tablet Take 1 tablet (81 mg total) by mouth once daily.    blood sugar diagnostic Strp To check BG 2 times daily, to use with insurance preferred meter    celecoxib (CELEBREX) 200 MG capsule Take 1 capsule (200 mg total) by mouth 2 (two) times daily.    dulaglutide (TRULICITY) 1.5 mg/0.5 mL pen injector Inject 1.5 mg into the skin every 7 days.    glimepiride (AMARYL) 2 MG tablet Take 1 tablet (2 mg total) by mouth before breakfast.    insulin glargine, TOUJEO, (TOUJEO SOLOSTAR U-300 INSULIN) 300 unit/mL (1.5 mL) InPn pen Inject 24 units once daily    lancets Misc To check BG 2 times daily, to use with insurance preferred meter    metFORMIN (GLUCOPHAGE) 1000 MG tablet Take 1 tablet by mouth twice daily    multivitamin (THERAGRAN) per tablet Take 1 tablet by mouth once daily.    pen needle, diabetic (BD ULTRA-FINE SHORT PEN NEEDLE) 31 gauge x 5/16" Ndle USE  ONCE DAILY    pravastatin (PRAVACHOL) 40 MG tablet Take 1 tablet (40 mg total) by mouth once daily.    sildenafil (REVATIO) 20 mg Tab 1-5 pills at a time per day prn    TRUEPLUS LANCETS 33 gauge Misc USE 1 TO CHECK GLUCOSE TWICE DAILY    blood-glucose meter kit To check BG 3 times daily, to use with insurance preferred meter    metFORMIN (GLUCOPHAGE) 1000 MG tablet Take 1 tablet by mouth twice daily     No current facility-administered medications for this visit.       ROS:  GENERAL: No fever. No chills. No fatigue. Denies weight loss. Denies weight gain.  HEENT: Denies headaches. Denies vision change. Denies eye pain. Denies double vision. Denies ear pain.   CV: Denies chest pain.   PULM: " "Denies of shortness of breath.  GI: Denies constipation. No diarrhea. No abdominal pain. Denies nausea. Denies vomiting. No blood in stool.  HEME: Denies bleeding problems.  : Denies urgency. No painful urination. No blood in urine.  MS: Denies joint stiffness. Denies joint swelling.  + Neck pain.  SKIN: Denies rash.   NEURO: Denies seizures. No weakness.  PSYCH:  Denies difficulty sleeping. No anxiety. Denies depression. No suicidal thoughts.       VITALS:   Vitals:    05/15/23 0955   BP: 135/74   Pulse: 83   Temp: 98.4 °F (36.9 °C)   Weight: 104.4 kg (230 lb 2.6 oz)   Height: 6' 4" (1.93 m)   PainSc:   4   PainLoc: Neck         PHYSICAL EXAM:   GENERAL: Well appearing, in no acute distress, alert and oriented x3.  PSYCH:  Mood and affect appropriate.  SKIN: Skin color, texture, turgor normal, no rashes or lesions.  HEENT:  Normocephalic, atraumatic. Cranial nerves grossly intact.  NECK: Pain with ROM, especially left. Radicular symptoms with leaning head to the left. Pain to palpation of left rhomboid region.   PULM: No evidence of respiratory difficulty, symmetric chest rise.  GI:  Non-distended  BACK: Normal range of motion.   EXTREMITIES: No deformities, edema, or skin discoloration.   MUSCULOSKELETAL: Shoulder, hip, and knee provocative maneuvers are negative.Hypothenar wasting.   NEURO: Sensation is equal and appropriate bilaterally. Decreased strength in bilateral interossei only. Plantar response are downgoing.    GAIT: normal.      LABS:      IMAGING:    MRI CERVICAL SPINE WITHOUT CONTRAST     CLINICAL HISTORY:  cervical radiculopathy with weakness in LUE; Radiculopathy, cervical region     TECHNIQUE:  Routine multiplanar, multisequence MR cervical spine protocol performed without the administration of contrast.     COMPARISON:  09/13/2019     FINDINGS:  The craniocervical junction is unremarkable.The cervical cord demonstrates normal size and signal.     No evidence of fracture, marrow replacement " process, or spondylo-discitis.     No paraspinal masses or inflammatory changes.     Degenerative changes/ spondylosis:     At C3-4, there ismild broad-based posterior disc osteophyte complex with small central disc protrusion abutting the cord and mild facet joint arthropathy, contributing to mild bilateral neural foraminal narrowing.  No spinal canal stenosis.     At C4-5, there isbroad-based posterior disc osteophyte complex with a small central disc protrusion.  No spinal canal stenosis or significant neural foraminal narrowing.     At C5-6, there ismild broad-based posterior disc osteophyte complex.  No spinal canal stenosis or significant neural foraminal narrowing.     At C6-7, there allison small left foraminal disc protrusion (series 3, image 8), possibly impinging upon the exiting left C7 nerve root.  No spinal canal stenosis.  There is mild uncovertebral spurring.     At C7-T1, there ismild facet joint arthropathy.  No spinal canal stenosis or significant neural foraminal narrowing.     Impression:     Small left foraminal disc protrusion at C6-7, possibly impinging upon the exiting left C7 nerve root, as above.        Electronically signed by: Jorge Leon MD  Date:                                            04/04/2023  Time:                                           08:39    ASSESSMENT: 57 y.o. year old male with pain, consistent with:    Encounter Diagnoses   Name Primary?    Cervical radiculopathy Yes    DDD (degenerative disc disease), cervical        DISCUSSION: [facility]. Morris [facility]. He was previously worked up by Dr. Mary for cervical radiculopathy. MRI shows a C6/7 foraminal protrusion with possible impingement on the left C7 nerve root. He reports not being able to carry heavy things with that arm, but there is no obvious unilateral weakness on exam. He does have interosseous weakness bilaterally. Exam does indicate pain with ROM of the neck and  reproduction of symptoms when he leans his head left but does not explain his interossei weakness.     PLAN:  Discussed conservative measures   Recommend JUAN PABLO and Gabapentin  Continue Celecoxib   Dr. Gutierrez concerned about ALS - referred him to Neurology  Recommend follow up with Dr. Mary or myself    Allie Torres  05/15/2023

## 2023-05-16 ENCOUNTER — HOSPITAL ENCOUNTER (OUTPATIENT)
Dept: RADIOLOGY | Facility: HOSPITAL | Age: 58
Discharge: HOME | End: 2023-05-16
Attending: NEUROLOGICAL SURGERY
Payer: COMMERCIAL

## 2023-05-16 ENCOUNTER — HOSPITAL ENCOUNTER (OUTPATIENT)
Facility: OTHER | Age: 58
Discharge: HOME | End: 2023-05-16
Attending: ANESTHESIOLOGY | Admitting: ANESTHESIOLOGY
Payer: COMMERCIAL

## 2023-05-16 VITALS
RESPIRATION RATE: 16 BRPM | TEMPERATURE: 98 F | BODY MASS INDEX: 27.89 KG/M2 | WEIGHT: 229 LBS | HEART RATE: 81 BPM | DIASTOLIC BLOOD PRESSURE: 72 MMHG | HEIGHT: 76 IN | SYSTOLIC BLOOD PRESSURE: 107 MMHG | OXYGEN SATURATION: 99 %

## 2023-05-16 DIAGNOSIS — M50.30 DDD (DEGENERATIVE DISC DISEASE), CERVICAL: ICD-10-CM

## 2023-05-16 DIAGNOSIS — G89.29 CHRONIC PAIN: ICD-10-CM

## 2023-05-16 DIAGNOSIS — G95.9 CERVICAL MYELOPATHY: ICD-10-CM

## 2023-05-16 DIAGNOSIS — R29.898 WEAKNESS OF BOTH HANDS: ICD-10-CM

## 2023-05-16 DIAGNOSIS — M54.12 CERVICAL RADICULOPATHY: Primary | ICD-10-CM

## 2023-05-16 LAB — POCT GLUCOSE: 91 MG/DL (ref 70–110)

## 2023-05-16 PROCEDURE — 62321 PR INJ CERV/THORAC, W/GUIDANCE: ICD-10-PCS | Mod: COE,,, | Performed by: ANESTHESIOLOGY

## 2023-05-16 PROCEDURE — 72156 MRI CERVICAL SPINE W WO CONTRAST: ICD-10-PCS | Mod: 26,COE,, | Performed by: RADIOLOGY

## 2023-05-16 PROCEDURE — 25500020 PHARM REV CODE 255: Performed by: ANESTHESIOLOGY

## 2023-05-16 PROCEDURE — 72156 MRI NECK SPINE W/O & W/DYE: CPT | Mod: TC

## 2023-05-16 PROCEDURE — 70553 MRI BRAIN STEM W/O & W/DYE: CPT | Mod: TC

## 2023-05-16 PROCEDURE — 62321 NJX INTERLAMINAR CRV/THRC: CPT | Mod: COE,,, | Performed by: ANESTHESIOLOGY

## 2023-05-16 PROCEDURE — 82947 ASSAY GLUCOSE BLOOD QUANT: CPT | Performed by: ANESTHESIOLOGY

## 2023-05-16 PROCEDURE — 72156 MRI NECK SPINE W/O & W/DYE: CPT | Mod: 26,COE,, | Performed by: RADIOLOGY

## 2023-05-16 PROCEDURE — A9585 GADOBUTROL INJECTION: HCPCS | Performed by: NEUROLOGICAL SURGERY

## 2023-05-16 PROCEDURE — 70553 MRI BRAIN W WO CONTRAST: ICD-10-PCS | Mod: 26,COE,, | Performed by: RADIOLOGY

## 2023-05-16 PROCEDURE — 70553 MRI BRAIN STEM W/O & W/DYE: CPT | Mod: 26,COE,, | Performed by: RADIOLOGY

## 2023-05-16 PROCEDURE — 25000003 PHARM REV CODE 250: Performed by: ANESTHESIOLOGY

## 2023-05-16 PROCEDURE — 62321 NJX INTERLAMINAR CRV/THRC: CPT | Performed by: ANESTHESIOLOGY

## 2023-05-16 PROCEDURE — 63600175 PHARM REV CODE 636 W HCPCS: Performed by: ANESTHESIOLOGY

## 2023-05-16 PROCEDURE — 25500020 PHARM REV CODE 255: Performed by: NEUROLOGICAL SURGERY

## 2023-05-16 RX ORDER — SODIUM CHLORIDE 9 MG/ML
500 INJECTION, SOLUTION INTRAVENOUS CONTINUOUS
Status: ACTIVE | OUTPATIENT
Start: 2023-05-16

## 2023-05-16 RX ORDER — LIDOCAINE HYDROCHLORIDE 20 MG/ML
INJECTION, SOLUTION INFILTRATION; PERINEURAL
Status: DISCONTINUED | OUTPATIENT
Start: 2023-05-16 | End: 2023-05-16 | Stop reason: HOSPADM

## 2023-05-16 RX ORDER — GADOBUTROL 604.72 MG/ML
10 INJECTION INTRAVENOUS
Status: COMPLETED | OUTPATIENT
Start: 2023-05-16 | End: 2023-05-16

## 2023-05-16 RX ORDER — MIDAZOLAM HYDROCHLORIDE 1 MG/ML
INJECTION INTRAMUSCULAR; INTRAVENOUS
Status: DISCONTINUED | OUTPATIENT
Start: 2023-05-16 | End: 2023-05-16 | Stop reason: HOSPADM

## 2023-05-16 RX ORDER — LIDOCAINE HYDROCHLORIDE 10 MG/ML
INJECTION, SOLUTION EPIDURAL; INFILTRATION; INTRACAUDAL; PERINEURAL
Status: DISCONTINUED | OUTPATIENT
Start: 2023-05-16 | End: 2023-05-16 | Stop reason: HOSPADM

## 2023-05-16 RX ORDER — FENTANYL CITRATE 50 UG/ML
INJECTION, SOLUTION INTRAMUSCULAR; INTRAVENOUS
Status: DISCONTINUED | OUTPATIENT
Start: 2023-05-16 | End: 2023-05-16 | Stop reason: HOSPADM

## 2023-05-16 RX ORDER — DEXAMETHASONE SODIUM PHOSPHATE 10 MG/ML
INJECTION INTRAMUSCULAR; INTRAVENOUS
Status: DISCONTINUED | OUTPATIENT
Start: 2023-05-16 | End: 2023-05-16 | Stop reason: HOSPADM

## 2023-05-16 RX ADMIN — GADOBUTROL 10 ML: 604.72 INJECTION INTRAVENOUS at 06:05

## 2023-05-16 NOTE — DISCHARGE SUMMARY
"Discharge Note  Short Stay      SUMMARY     Admit Date: 5/16/2023    Attending Physician: Allie Torres      Discharge Physician: Allie Torres      Discharge Date: 5/16/2023 11:36 AM    Procedure(s) (LRB):  INJECTION, STEROID, EPIDURAL, C7-T1 (LEFT SIDE) ECEN (N/A)    Final Diagnosis: Cervical radiculopathy [M54.12]    Disposition: Home or self care    Patient Instructions:   Current Discharge Medication List        CONTINUE these medications which have NOT CHANGED    Details   aspirin (ECOTRIN) 81 MG EC tablet Take 1 tablet (81 mg total) by mouth once daily.  Refills: 0      blood sugar diagnostic Strp To check BG 2 times daily, to use with insurance preferred meter  Qty: 200 each, Refills: 3    Comments: e11.65      blood-glucose meter kit To check BG 3 times daily, to use with insurance preferred meter  Qty: 1 each, Refills: 0      celecoxib (CELEBREX) 200 MG capsule Take 1 capsule (200 mg total) by mouth 2 (two) times daily.  Qty: 30 capsule, Refills: 0      dulaglutide (TRULICITY) 1.5 mg/0.5 mL pen injector Inject 1.5 mg into the skin every 7 days.  Qty: 4 pen, Refills: 5      glimepiride (AMARYL) 2 MG tablet Take 1 tablet (2 mg total) by mouth before breakfast.  Qty: 90 tablet, Refills: 2      insulin glargine, TOUJEO, (TOUJEO SOLOSTAR U-300 INSULIN) 300 unit/mL (1.5 mL) InPn pen Inject 24 units once daily  Qty: 6 pen, Refills: 12    Associated Diagnoses: Type 2 diabetes, controlled, with neuropathy      !! lancets Misc To check BG 2 times daily, to use with insurance preferred meter  Qty: 200 each, Refills: 3      metFORMIN (GLUCOPHAGE) 1000 MG tablet Take 1 tablet by mouth twice daily  Qty: 180 tablet, Refills: 3      multivitamin (THERAGRAN) per tablet Take 1 tablet by mouth once daily.      pen needle, diabetic (BD ULTRA-FINE SHORT PEN NEEDLE) 31 gauge x 5/16" Ndle USE  ONCE DAILY  Qty: 100 each, Refills: 0    Associated Diagnoses: Type 2 diabetes, uncontrolled, with neuropathy      pravastatin " (PRAVACHOL) 40 MG tablet Take 1 tablet (40 mg total) by mouth once daily.  Qty: 90 tablet, Refills: 0    Associated Diagnoses: Hyperlipidemia, unspecified hyperlipidemia type      sildenafil (REVATIO) 20 mg Tab 1-5 pills at a time per day prn  Qty: 90 tablet, Refills: 1      !! TRUEPLUS LANCETS 33 gauge Misc USE 1 TO CHECK GLUCOSE TWICE DAILY       !! - Potential duplicate medications found. Please discuss with provider.              Discharge Diagnosis: Cervical radiculopathy [M54.12]  Condition on Discharge: Stable with no complications to procedure   Diet on Discharge: Same as before.  Activity: as per instruction sheet.  Discharge to: Home with a responsible adult.  Follow up: 2-4 weeks       Please call my office or pager at [phone] if experienced any weakness or loss of sensation, fever > 101.5, pain uncontrolled with oral medications, persistent nausea/vomiting/or diarrhea, redness or drainage from the incisions, or any other worrisome concerns. If physician on call was not reached or could not communicate with our office for any reason please go to the nearest emergency department      Allie Torres  05/16/2023

## 2023-05-16 NOTE — INTERVAL H&P NOTE
The patient has been examined and the H&P has been reviewed:    I concur with the findings and no changes have occurred since H&P was written.    Procedure risks, benefits and alternative options discussed and understood by patient/family.

## 2023-05-16 NOTE — OP NOTE
Cervical Interlaminar Epidural Steroid Injection under Fluoroscopic Guidance    The procedure, risks, benefits, and options were discussed with the patient. There are no contraindications to the procedure. The patent expressed understanding and agreed to the procedure. Informed written consent was obtained prior to the start of the procedure and can be found in the patient's chart.     PATIENT NAME: Abel Morris   MRN: [id number]     DATE OF PROCEDURE: 05/16/2023    PROCEDURE: Cervical Interlaminar Epidural Steroid Injection C7/T1 under Fluoroscopic Guidance    PRE-OP DIAGNOSIS: Cervical radiculopathy [M54.12] Cervical radiculopathy [M54.12]    POST-OP DIAGNOSIS: Same    PHYSICIAN: Allie Torres MD     ASSISTANTS: Felton White MD    MEDICATIONS INJECTED: Preservative-free Decadron 10mg with 1cc of Lidocaine 1% MPF and preservative free normal saline    LOCAL ANESTHETIC INJECTED: Xylocaine 2%     SEDATION: Versed 2mg and Fentanyl 50mcg                                                                                                                                                                                     Conscious sedation ordered by [email or web address]. Patient re-evaluation prior to administration of conscious sedation. No changes noted in patient's status from initial evaluation. The patient's vital signs were monitored by RN and patient remained hemodynamically stable throughout the procedure.      Event Time In   Sedation Start 1128   Sedation End 1136     ESTIMATED BLOOD LOSS: None    COMPLICATIONS: None    TECHNIQUE: Time-out was performed to identify the patient and procedure to be performed. With the patient laying in a prone position, the surgical area was prepped and draped in the usual sterile fashion using ChloraPrep and a fenestrated drape. The level was determined under fluoroscopy guidance. Skin anesthesia was achieved by injecting Lidocaine 2% over the injection site.  The interlaminar space was then  approached with a 20 gauge, 3.5 inch Tuohy needle that was introduced under fluoroscopic guidance with AP, lateral and/or contralateral oblique imaging. Once the Ligamentum flavum was encountered loss of resistance to air was used to enter the epidural space. With positive loss of resistance and negative aspiration for CSF or Blood, contrast dye  Omnipaque (300mg/mL) was injected to confirm placement and there was no vascular runoff. Then 2 mL of the medication mixture listed above was then injected slowly. Displacement of the radio opaque contrast after injection of the medication confirmed that the medication went into the area of the epidural space. The needles were removed, and bleeding was nil. A sterile dressing was applied. No specimens collected. The patient tolerated the procedure well.       The patient was monitored after the procedure in the recovery area. They were given post-procedure and discharge instructions to follow at home. The patient was discharged in a stable condition.      Allie Torres MD

## 2023-05-16 NOTE — DISCHARGE INSTRUCTIONS

## 2023-05-18 ENCOUNTER — PATIENT MESSAGE (OUTPATIENT)
Dept: SPINE | Facility: CLINIC | Age: 58
End: 2023-05-18
Payer: COMMERCIAL

## 2023-05-18 ENCOUNTER — PATIENT MESSAGE (OUTPATIENT)
Dept: ADMINISTRATIVE | Facility: OTHER | Age: 58
End: 2023-05-18
Payer: COMMERCIAL

## 2023-05-19 ENCOUNTER — TELEPHONE (OUTPATIENT)
Dept: NEUROSURGERY | Facility: CLINIC | Age: 58
End: 2023-05-19
Payer: COMMERCIAL

## 2023-05-19 NOTE — TELEPHONE ENCOUNTER
[facility] patient saw Dr. Gutierrez- no surgery indicated at this time. Patient got C7-t1 TITI with Dr. Lawler and MRI Brain and Cervical. Neurology consult placed. Notified neurology RN navigator and secured appointment with Dr. Amos. Notified patient of neurology appointment for further neuro appointment. Patient verbalizes understanding. patient to followup as needed with dr. Gutierrez after Neurology workup. all appointments linked, [facility] bundle closed. all clinical notes faxed to [facility].    Future Appointments   Date Time Provider Department Center   5/29/2023  1:00 PM Kike Amos MD [facility] NEURO [facility]ael y   6/16/2023  8:00 AM Mariajose Guerrier OD [facility] V[facility]SA Horne   7/11/2023  9:45 AM Sammie Crouch DPM [facility] POD Ozzie De La Torre, RN, CMSRN  RN Navigator  [facility]

## 2023-05-25 ENCOUNTER — TELEPHONE (OUTPATIENT)
Dept: NEUROLOGY | Facility: CLINIC | Age: 58
End: 2023-05-25
Payer: COMMERCIAL

## 2023-05-25 NOTE — TELEPHONE ENCOUNTER
LVM 5/18 and again today. LVM for the patient. Offered next available appointment. 5/29 at 1 pm with Dr Amos. Left direct contact information and sent portal message as well. Looks as if pt confirmed electronically.

## 2023-05-29 ENCOUNTER — OFFICE VISIT (OUTPATIENT)
Dept: NEUROLOGY | Facility: CLINIC | Age: 58
End: 2023-05-29
Payer: COMMERCIAL

## 2023-05-29 VITALS
SYSTOLIC BLOOD PRESSURE: 132 MMHG | DIASTOLIC BLOOD PRESSURE: 91 MMHG | BODY MASS INDEX: 28.42 KG/M2 | WEIGHT: 233.38 LBS | HEIGHT: 76 IN | HEART RATE: 79 BPM

## 2023-05-29 DIAGNOSIS — G95.9 CERVICAL MYELOPATHY: ICD-10-CM

## 2023-05-29 DIAGNOSIS — E11.65 POORLY CONTROLLED DIABETES MELLITUS: Primary | ICD-10-CM

## 2023-05-29 DIAGNOSIS — Z74.09 IMPAIRED FUNCTIONAL MOBILITY AND ACTIVITY TOLERANCE: ICD-10-CM

## 2023-05-29 DIAGNOSIS — R29.898 WEAKNESS OF BOTH HANDS: ICD-10-CM

## 2023-05-29 DIAGNOSIS — I10 PRIMARY HYPERTENSION: ICD-10-CM

## 2023-05-29 DIAGNOSIS — E78.2 MIXED HYPERLIPIDEMIA: ICD-10-CM

## 2023-05-29 DIAGNOSIS — E11.42 DIABETIC POLYNEUROPATHY ASSOCIATED WITH TYPE 2 DIABETES MELLITUS: ICD-10-CM

## 2023-05-29 PROCEDURE — 99999 PR PBB SHADOW E&M-EST. PATIENT-LVL IV: ICD-10-PCS | Mod: PBBFAC,COE,, | Performed by: PSYCHIATRY & NEUROLOGY

## 2023-05-29 PROCEDURE — 99215 PR OFFICE/OUTPT VISIT, EST, LEVL V, 40-54 MIN: ICD-10-PCS | Mod: S$GLB,,, | Performed by: PSYCHIATRY & NEUROLOGY

## 2023-05-29 PROCEDURE — 99999 PR PBB SHADOW E&M-EST. PATIENT-LVL IV: CPT | Mod: PBBFAC,COE,, | Performed by: PSYCHIATRY & NEUROLOGY

## 2023-05-29 PROCEDURE — 99215 OFFICE O/P EST HI 40 MIN: CPT | Mod: S$GLB,,, | Performed by: PSYCHIATRY & NEUROLOGY

## 2023-05-29 NOTE — PROGRESS NOTES
"  Subjective:     Chief Complaint:  No chief complaint on file.        History of Present Illness:  I have reviewed all relevant medical records in Epic. Abel Morris is a 58 y.o. male who presents today for bilateral upper extremity weaknesses, which he reports has been present "for a few years." He is uncertain but thinks that maybe the R hand was first to become weak. Both are now weak and there has been a decline in ADLs, fine motor skills. He denies any sensory changes in the hands. He denied noticing hand atrophy initially until after losing significant weight (80# intentionally) in 2012, then he noted hand muscle atrophy. His hands weakness was noted while working at [employer] as an .    He has been on medical leave since March 2023 due to weaknesses. He denies jeramy neck injury but does have L>R neck pain that radiates to the 4/5 digits. No known cubital tunnel syndrome. He recently got cervical TITI and noted relief but it is now wearing off. He was actually scheduled for surgery but then was told that his insurance wouldn't cover the expenses. He denies LE symptoms.    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 He denies orthopnea, DOUGLAS, dysphagia. Weigh has been steady. He believes his gait is normal and unchanged. He has not noticed muscle fasciculations. His handwriting and ADLs are reportedly normal despite his complaints of declining ADLs abilities.    Mom passed away from breast cancer. Dad has progressing dementia (not FTD). His siblings have no neurological disorders. He has 2 adult sons and one adult daughter and all are healthy.    Review of Systems  Review of Systems   Constitutional:  Positive for activity change. Negative for fatigue, fever and unexpected weight change.   HENT:  Positive for voice change. Negative for drooling, hearing loss and trouble swallowing.    Eyes:  Negative for pain, redness and visual disturbance.   Respiratory:  " "Negative for choking, chest tightness and shortness of breath.    Cardiovascular:  Negative for chest pain.   Gastrointestinal:  Negative for abdominal pain, nausea and vomiting.   Endocrine: Negative for cold intolerance.   Genitourinary:  Negative for frequency.   Musculoskeletal:  Positive for back pain. Negative for arthralgias, gait problem, joint swelling, myalgias and neck pain.   Skin:  Negative for color change.   Allergic/Immunologic: Negative for immunocompromised state.   Neurological:  Positive for weakness. Negative for dizziness, seizures, speech difficulty, numbness and headaches.   Hematological:  Negative for adenopathy.   Psychiatric/Behavioral:  Negative for agitation, behavioral problems, dysphoric mood and suicidal ideas.         Objective:     Vitals:    05/29/23 1249   BP: (!) 132/91   Pulse: 79   Weight: 105.8 kg (233 lb 5.7 oz)   Height: 6' 4" (1.93 m)   PainSc:   3       Neurologic Exam     Mental Status   Speech: speech is normal   Level of consciousness: alert    Cranial Nerves     CN III, IV, VI   Pupils are equal, round, and reactive to light.  Extraocular motions are normal.     CN XI   Right sternocleidomastoid strength: normal  Left sternocleidomastoid strength: normal  Right trapezius strength: normal  Left trapezius strength: normal    CN XII   Tongue: not atrophic  Fasciculations: absent  Full tongue strength     Motor Exam     Strength   Strength 5/5 except as noted. L triceps 4/5  L wrist extension / flexion 4/5  Bilateral FDIs and ADMs  4-/5  Bilateral APBs 4/5    Interosseous atrophy L>R    R hip flexion 4 to 4+/5  L hip flexion 4+/5  R dorsiflexion 4/5, L 4+/5  R eversion 4+/5, L 4/5  R inversion 4/5, L 4/5       Sensory Exam   Right leg light touch: decreased from ankle  Left leg light touch: decreased from ankle  Right leg vibration: decreased from ankle  Left leg vibration: decreased from ankle  Right leg proprioception: decreased from toes  Left leg proprioception: " decreased from toes  Right leg pinprick: decreased from ankle  Left leg pinprick: decreased from ankle    Gait, Coordination, and Reflexes     Gait  Gait: (Mild bilateral footdrop)    Reflexes   Right brachioradialis: 1+  Left brachioradialis: 1+  Right biceps: 1+  Right triceps: 1+  Left triceps: 1+  Right patellar: 1+ (but with crossed adduction)  Left patellar: 1+ (but with crossed adduction)  Right achilles: 0  Left achilles: 0  Right plantar: equivocal  Left plantar: equivocal  Right Hanson: absent  Left Hanson: absent      Physical Exam  Vitals reviewed.   Constitutional:       Appearance: He is well-developed.   HENT:      Head: Normocephalic and atraumatic.   Eyes:      Extraocular Movements: Extraocular movements intact and EOM normal.      Pupils: Pupils are equal, round, and reactive to light.   Neck:      Thyroid: No thyromegaly.   Cardiovascular:      Rate and Rhythm: Normal rate.   Pulmonary:      Effort: Pulmonary effort is normal.   Abdominal:      Palpations: Abdomen is soft.   Musculoskeletal:      Cervical back: Normal range of motion and neck supple.   Lymphadenopathy:      Cervical: No cervical adenopathy.   Skin:     General: Skin is warm and dry.   Neurological:      Mental Status: He is alert.      Motor: Weakness present.      Gait: Gait abnormal.      Deep Tendon Reflexes:      Reflex Scores:       Tricep reflexes are 1+ on the right side and 1+ on the left side.       Bicep reflexes are 1+ on the right side.       Brachioradialis reflexes are 1+ on the right side and 1+ on the left side.       Patellar reflexes are 1+ (but with crossed adduction) on the right side and 1+ (but with crossed adduction) on the left side.       Achilles reflexes are 0 on the right side and 0 on the left side.  Psychiatric:         Speech: Speech normal.         Behavior: Behavior normal.         Thought Content: Thought content normal.           Lab Results   Component Value Date    WBC 4.12 10/10/2021    HGB  14.7 10/10/2021    HCT 42.7 10/10/2021     10/10/2021    CHOL 212 (H) 03/03/2023    TRIG 152 (H) 03/03/2023    HDL 44 03/03/2023    ALT 37 03/03/2023    AST 34 03/03/2023     (L) 03/03/2023    K 3.9 03/03/2023     03/03/2023    CREATININE 0.9 03/03/2023    BUN 10 03/03/2023    CO2 26 03/03/2023    TSH 1.843 04/30/2021    HGBA1C 7.1 (H) 04/26/2023    EUQNYIZO11 510 08/14/2019    VITAMINB6 7 10/21/2019         EMG 6/6/23 - Impression   This is an abnormal study. There is electrophysiologic evidence of:   1. A length dependant and primarily axonal sensorimotor polyneuropathy in the lower and upper extremities, which is non-specific but most commonly occurs in metabolic derangements like diabetes, as well as in some endocrine, autoimmune, paraneoplastic, toxic, and hereditary neuropathies.   2. At least moderate severity right carpal tunnel syndrome (median neuropathy at the wrist).   3. Widespread active denervation (fibrillation potentials) in multiple muscles in the distal right leg, which can be accounted for by this patient's known history of severe diabetes. Alternatively, the active muscle denervation could be part of a more systemic pathology such as motor neuron disease. Surprisingly, there were no other muscles evaluated that revealed active denervation, including the muscles of the right upper extremity where there is significant muscle atrophy in the forearm and hand (split hand phenomenon is present). Even though there remains a suspicion for motor neuron disease, electrophysiologic criteria are not met at this time. Similarly, the neurological exam is abnormal but not definitively convincing for motor neuron disease. The physical changes that are diabetes-related confound the exam. Close surveillance recommended    I have interpreted and reviewed all relevant electrodiagnostic studies independently and I have reviewed the relevant reports. I have also reviewed the findings together with  the patient in the clinic and answered any questions posed regarding the studies.      Assessment and Plan:     Problem List Items Addressed This Visit       Poorly controlled diabetes mellitus - Primary    Current Assessment & Plan     On appropriate medications and managed by PCP. We discussed the importance of managing all secondary stroke risk factors, including DM2.           Diabetic polyneuropathy associated with type 2 diabetes mellitus    Current Assessment & Plan     Markedly elevated A1c until recently, which may be responsible for the progressive atrophy and weakness he has been experiencing. The onset of weaknesses were many years ago and they have slowly progressed, which is not common with MND. EMG showed a profound sensorimotor polyneuropathy consistent with DM2. There was limited distribution of fibrillation waves, which can also be seen in poorly-controlled DM2. His clinic exam is not classic for MND/ALS at this time but close clinical surveillance is recommended.   - RTC in 4-6 months         Impaired functional mobility and activity tolerance    Hyperlipidemia    Current Assessment & Plan     On appropriate medications and managed by PCP. We discussed the importance of managing all secondary stroke risk factors, including hyperlipidemia.           Primary hypertension    Current Assessment & Plan     On appropriate medications and managed by PCP. We discussed the importance of managing all secondary stroke risk factors, including hypertension.            Other Visit Diagnoses       Cervical myelopathy        Weakness of both hands        Relevant Orders    CK    EMG W/ ULTRASOUND AND NERVE CONDUCTION TEST 2 Extremities (Completed)          RTC in 4-6 months    Vipin Amos MD  [facility]

## 2023-06-06 ENCOUNTER — PATIENT MESSAGE (OUTPATIENT)
Dept: NEUROLOGY | Facility: CLINIC | Age: 58
End: 2023-06-06

## 2023-06-06 ENCOUNTER — PROCEDURE VISIT (OUTPATIENT)
Dept: NEUROLOGY | Facility: CLINIC | Age: 58
End: 2023-06-06
Payer: COMMERCIAL

## 2023-06-06 DIAGNOSIS — R29.898 WEAKNESS OF BOTH HANDS: ICD-10-CM

## 2023-06-06 PROCEDURE — 95913 PR NERVE CONDUCTION STUDY; 13 OR MORE STUDIES: ICD-10-PCS | Mod: S$GLB,,, | Performed by: PSYCHIATRY & NEUROLOGY

## 2023-06-06 PROCEDURE — 95886 PR EMG COMPLETE, W/ NERVE CONDUCTION STUDIES, 5+ MUSCLES: ICD-10-PCS | Mod: S$GLB,,, | Performed by: PSYCHIATRY & NEUROLOGY

## 2023-06-06 PROCEDURE — 95913 NRV CNDJ TEST 13/> STUDIES: CPT | Mod: S$GLB,,, | Performed by: PSYCHIATRY & NEUROLOGY

## 2023-06-06 PROCEDURE — 95886 MUSC TEST DONE W/N TEST COMP: CPT | Mod: S$GLB,,, | Performed by: PSYCHIATRY & NEUROLOGY

## 2023-06-08 ENCOUNTER — PATIENT MESSAGE (OUTPATIENT)
Dept: FAMILY MEDICINE | Facility: CLINIC | Age: 58
End: 2023-06-08
Payer: COMMERCIAL

## 2023-06-08 DIAGNOSIS — Z79.4 TYPE 2 DIABETES MELLITUS WITH OTHER SPECIFIED COMPLICATION, WITH LONG-TERM CURRENT USE OF INSULIN: ICD-10-CM

## 2023-06-08 DIAGNOSIS — E11.69 TYPE 2 DIABETES MELLITUS WITH OTHER SPECIFIED COMPLICATION, WITH LONG-TERM CURRENT USE OF INSULIN: ICD-10-CM

## 2023-06-08 NOTE — TELEPHONE ENCOUNTER
No care due was identified.  [facility] Embedded Care Due Messages. Reference number: [id number].   6/08/2023 7:38:10 AM CDT

## 2023-06-09 RX ORDER — PEN NEEDLE, DIABETIC 30 GX3/16"
NEEDLE, DISPOSABLE MISCELLANEOUS
Qty: 100 EACH | Refills: 0 | Status: SHIPPED | OUTPATIENT
Start: 2023-06-09

## 2023-06-26 ENCOUNTER — PATIENT MESSAGE (OUTPATIENT)
Dept: SPINE | Facility: CLINIC | Age: 58
End: 2023-06-26
Payer: COMMERCIAL

## 2023-06-27 ENCOUNTER — PATIENT MESSAGE (OUTPATIENT)
Dept: NEUROLOGY | Facility: CLINIC | Age: 58
End: 2023-06-27
Payer: COMMERCIAL

## 2023-06-27 ENCOUNTER — PATIENT MESSAGE (OUTPATIENT)
Dept: SPINE | Facility: CLINIC | Age: 58
End: 2023-06-27
Payer: COMMERCIAL

## 2023-06-28 ENCOUNTER — PATIENT MESSAGE (OUTPATIENT)
Dept: FAMILY MEDICINE | Facility: CLINIC | Age: 58
End: 2023-06-28
Payer: COMMERCIAL

## 2023-06-28 NOTE — TELEPHONE ENCOUNTER
Please advise,        Good morning Doctor Ehrensing.  I need your assistance.  Can you reach out to the Doctors that I have previously seen and find out what are they planning to to about my condition. First of I had to stop my already scheduled surgery because [facility] said they no longer cover spine surgery unless I go through the [facility] and see a [facility] Doctor. I was told by the c.o.e. Doctors that surgery may not be necessary but since the scheduled testing I haven't heard from them and the pain has since returned.

## 2023-07-05 ENCOUNTER — OFFICE VISIT (OUTPATIENT)
Dept: PAIN MEDICINE | Facility: CLINIC | Age: 58
End: 2023-07-05
Payer: COMMERCIAL

## 2023-07-05 VITALS
SYSTOLIC BLOOD PRESSURE: 123 MMHG | DIASTOLIC BLOOD PRESSURE: 80 MMHG | OXYGEN SATURATION: 100 % | RESPIRATION RATE: 18 BRPM | HEIGHT: 76 IN | BODY MASS INDEX: 29.48 KG/M2 | HEART RATE: 98 BPM | WEIGHT: 242.06 LBS

## 2023-07-05 DIAGNOSIS — M54.12 CERVICAL RADICULOPATHY: Primary | ICD-10-CM

## 2023-07-05 DIAGNOSIS — M50.30 DDD (DEGENERATIVE DISC DISEASE), CERVICAL: ICD-10-CM

## 2023-07-05 PROCEDURE — 99214 OFFICE O/P EST MOD 30 MIN: CPT | Mod: S$GLB,,, | Performed by: ANESTHESIOLOGY

## 2023-07-05 PROCEDURE — 99999 PR PBB SHADOW E&M-EST. PATIENT-LVL IV: CPT | Mod: PBBFAC,COE,, | Performed by: ANESTHESIOLOGY

## 2023-07-05 PROCEDURE — 99214 PR OFFICE/OUTPT VISIT, EST, LEVL IV, 30-39 MIN: ICD-10-PCS | Mod: S$GLB,,, | Performed by: ANESTHESIOLOGY

## 2023-07-05 PROCEDURE — 99999 PR PBB SHADOW E&M-EST. PATIENT-LVL IV: ICD-10-PCS | Mod: PBBFAC,COE,, | Performed by: ANESTHESIOLOGY

## 2023-07-05 RX ORDER — PREGABALIN 50 MG/1
50 CAPSULE ORAL 2 TIMES DAILY
Qty: 60 CAPSULE | Refills: 1 | Status: SHIPPED | OUTPATIENT
Start: 2023-07-05 | End: 2023-09-03

## 2023-07-05 NOTE — PROGRESS NOTES
PCP: Sae Starkey MD    REFERRING PHYSICIAN: No ref. provider found    CHIEF COMPLAINT: Pain in the neck that radiates to the left arm    Original HISTORY OF PRESENT ILLNESS: Abel Morris presents to the clinic for the evaluation of the above pain. The pain started 2 months ago.     Original Pain Description:  The pain is located in the left neck and radiates to the left 4th and 5th fingers. The pain is described as stabbing, tingling. Exacerbating factors: twisting, looking down. Mitigating factors sleeping. Symptoms interfere with daily activity. The patient feels like symptoms have been unchanged. Patient denies night fever/night sweats, urinary incontinence, bowel incontinence, and significant weight loss. He does note some weakness in the left arm. Patient reports that his glucose only increased 15-20 points after the TITI.     Original PAIN SCORES:  Best: Pain is 2  Worst: Pain is 8  Current: Pain is 4    INTERVAL HISTORY 7/05/23:   Patient presents for follow up. Patient reports that he followed up with Neurology, but that the Neurologist did not suspect he had ALS, but is not convinced it is completely due to his DM. He is s/p C7/T1 TITI on 05/16/23. Patient reports he had 90% pain relief, but it only lasted 2 weeks. He states that his pain level is back to 50% of previous. The pain is located in the left neck and radiates to the left 4th and 5th fingers. He reports that both of his hands remain weak as well as his legs.     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    History of colonic polyps 8/17/2018    Hyperlipidemia 11/28/2012    Poor compliance 1/8/2014    Primary hypertension 10/9/2021     Past Surgical History:   Procedure Laterality Date    COLONOSCOPY N/A 8/2/2016    Procedure: COLONOSCOPY;  Surgeon: Miguel Persaud MD;  Location: [facility] ENDO;  Service: Endoscopy;  Laterality: N/A;    COLONOSCOPY N/A 9/3/2019    Procedure: COLONOSCOPY;  Surgeon: Álvaro Carmichael MD;  Location: [facility] ENDO;  Service: Endoscopy;  Laterality: N/A;  confirmed appt- sp    EPIDURAL STEROID INJECTION N/A 5/16/2023    Procedure: INJECTION, STEROID, EPIDURAL, C7-T1 (LEFT SIDE) ECEN;  Surgeon: Allie Torres MD;  Location: [facility] PAIN MGT;  Service: Pain Management;  Laterality: N/A;    LUMBAR PUNCTURE N/A 09/19/2019     Social History     Socioeconomic History    Marital status:    Tobacco Use    Smoking status: Former     Types: Cigars    Smokeless tobacco: Never   Substance and Sexual Activity    Alcohol use: Yes     Alcohol/week: 6.0 standard drinks     Types: 6 Cans of beer per week     Comment: per week    Drug use: No    Sexual activity: Not Currently     Social Determinants of Health     Financial Resource Strain: Low Risk     Difficulty of Paying Living Expenses: Not hard at all   Food Insecurity: No Food Insecurity    Worried About Running Out of Food in the Last Year: Never true    Ran Out of Food in the Last Year: Never true   Transportation Needs: No Transportation Needs    Lack of Transportation (Medical): No    Lack of Transportation (Non-Medical): No   Physical Activity: Inactive    Days of Exercise per Week: 0 days    Minutes of Exercise per Session: 0 min   Stress: No Stress Concern Present    Feeling of Stress : Not at all   Social Connections: Unknown    Frequency of Communication with Friends and Family: More than three times a week    Frequency of  Social Gatherings with Friends and Family: More than three times a week    Active Member of Clubs or Organizations: Yes    Attends Club or Organization Meetings: More than 4 times per year    Marital Status:    Housing Stability: High Risk    Unable to Pay for Housing in the Last Year: Yes    Number of Places Lived in the Last Year: 1    Unstable Housing in the Last Year: No     Family History   Problem Relation Age of Onset    No Known Problems Mother     Colon polyps Father 61    No Known Problems Sister     No Known Problems Brother     No Known Problems Maternal Aunt     No Known Problems Maternal Uncle     No Known Problems Paternal Aunt     No Known Problems Paternal Uncle     Diabetes Maternal Grandmother     No Known Problems Maternal Grandfather     No Known Problems Paternal Grandmother     No Known Problems Paternal Grandfather     Amblyopia Neg Hx     Blindness Neg Hx     Cancer Neg Hx     Cataracts Neg Hx     Glaucoma Neg Hx     Hypertension Neg Hx     Macular degeneration Neg Hx     Retinal detachment Neg Hx     Strabismus Neg Hx     Stroke Neg Hx     Thyroid disease Neg Hx        Review of patient's allergies indicates:  No Known Allergies    Current Outpatient Medications   Medication Sig    blood sugar diagnostic Strp To check BG 2 times daily, to use with insurance preferred meter    celecoxib (CELEBREX) 200 MG capsule Take 1 capsule (200 mg total) by mouth 2 (two) times daily.    dulaglutide (TRULICITY) 1.5 mg/0.5 mL pen injector Inject 1.5 mg into the skin every 7 days.    glimepiride (AMARYL) 2 MG tablet Take 1 tablet (2 mg total) by mouth before breakfast.    insulin glargine, TOUJEO, (TOUJEO SOLOSTAR U-300 INSULIN) 300 unit/mL (1.5 mL) InPn pen Inject 24 units once daily    lancets Misc To check BG 2 times daily, to use with insurance preferred meter    metFORMIN (GLUCOPHAGE) 1000 MG tablet Take 1 tablet by mouth twice daily    multivitamin (THERAGRAN) per tablet Take 1 tablet by mouth  "once daily.    pen needle, diabetic (BD ULTRA-FINE SHORT PEN NEEDLE) 31 gauge x 5/16" Ndle USE  ONCE DAILY    pravastatin (PRAVACHOL) 40 MG tablet Take 1 tablet (40 mg total) by mouth once daily.    sildenafil (REVATIO) 20 mg Tab 1-5 pills at a time per day prn    TRUEPLUS LANCETS 33 gauge Misc USE 1 TO CHECK GLUCOSE TWICE DAILY    aspirin (ECOTRIN) 81 MG EC tablet Take 1 tablet (81 mg total) by mouth once daily.    blood-glucose meter kit To check BG 3 times daily, to use with insurance preferred meter     No current facility-administered medications for this visit.     Facility-Administered Medications Ordered in Other Visits   Medication    0.9%  NaCl infusion       ROS:  GENERAL: No fever. No chills. No fatigue. Denies weight loss. Denies weight gain.  HEENT: Denies headaches. Denies vision change. Denies eye pain. Denies double vision. Denies ear pain.   CV: Denies chest pain.   PULM: Denies of shortness of breath.  GI: Denies constipation. No diarrhea. No abdominal pain. Denies nausea. Denies vomiting. No blood in stool.  HEME: Denies bleeding problems.  : Denies urgency. No painful urination. No blood in urine.  MS: Denies joint stiffness. Denies joint swelling.  + Neck pain.  SKIN: Denies rash.   NEURO: Denies seizures. No weakness.  PSYCH:  Denies difficulty sleeping. No anxiety. Denies depression. No suicidal thoughts.       VITALS:   Vitals:    07/05/23 0902   BP: 123/80   Pulse: 98   Resp: 18   SpO2: 100%   Weight: 109.8 kg (242 lb 1 oz)   Height: 6' 4" (1.93 m)   PainSc:   5   PainLoc: Neck           PHYSICAL EXAM:   GENERAL: Well appearing, in no acute distress, alert and oriented x3.  PSYCH:  Mood and affect appropriate.  SKIN: Skin color, texture, turgor normal, no rashes or lesions.  HEENT:  Normocephalic, atraumatic. Cranial nerves grossly intact.  NECK: Pain with ROM, especially left. Radicular symptoms with leaning head to the left. Pain to palpation of left rhomboid region.   PULM: No evidence " of respiratory difficulty, symmetric chest rise.  GI:  Non-distended  BACK: Normal range of motion.   EXTREMITIES: No deformities, edema, or skin discoloration.   MUSCULOSKELETAL: Shoulder, hip, and knee provocative maneuvers are negative.Hypothenar wasting.   NEURO: Sensation is equal and appropriate bilaterally. Decreased  strength bilaterally. 4/5 motor LLE. Negative Hanson's / Babinski / clonus bilaterally.  Plantar response are downgoing.    GAIT: normal.      LABS:      IMAGING:    MRI CERVICAL SPINE WITHOUT CONTRAST     CLINICAL HISTORY:  cervical radiculopathy with weakness in LUE; Radiculopathy, cervical region     TECHNIQUE:  Routine multiplanar, multisequence MR cervical spine protocol performed without the administration of contrast.     COMPARISON:  09/13/2019     FINDINGS:  The craniocervical junction is unremarkable.The cervical cord demonstrates normal size and signal.     No evidence of fracture, marrow replacement process, or spondylo-discitis.     No paraspinal masses or inflammatory changes.     Degenerative changes/ spondylosis:     At C3-4, there ismild broad-based posterior disc osteophyte complex with small central disc protrusion abutting the cord and mild facet joint arthropathy, contributing to mild bilateral neural foraminal narrowing.  No spinal canal stenosis.     At C4-5, there isbroad-based posterior disc osteophyte complex with a small central disc protrusion.  No spinal canal stenosis or significant neural foraminal narrowing.     At C5-6, there ismild broad-based posterior disc osteophyte complex.  No spinal canal stenosis or significant neural foraminal narrowing.     At C6-7, there allison small left foraminal disc protrusion (series 3, image 8), possibly impinging upon the exiting left C7 nerve root.  No spinal canal stenosis.  There is mild uncovertebral spurring.     At C7-T1, there ismild facet joint arthropathy.  No spinal canal stenosis or significant neural foraminal  narrowing.     Impression:     Small left foraminal disc protrusion at C6-7, possibly impinging upon the exiting left C7 nerve root, as above.        Electronically signed by: Jorge Leon MD  Date:                                            04/04/2023  Time:                                           08:39    EMG 5/29/23  Impression   This is an abnormal study. There is electrophysiologic evidence of:   1. A length dependant and primarily axonal sensorimotor polyneuropathy in the lower and upper extremities, which is non-specific but most commonly occurs in metabolic derangements like diabetes, as well as in some endocrine, autoimmune, paraneoplastic, toxic, and hereditary neuropathies.   2. At least moderate severity right carpal tunnel syndrome (median neuropathy at the wrist).   3. Widespread active denervation (fibrillation potentials) in multiple muscles in the distal right leg, which can be accounted for by this patient's known history of severe diabetes. Alternatively, the active muscle denervation could be part of a more systemic pathology such as motor neuron disease. Surprisingly, there were no other muscles evaluated that revealed active denervation, including the muscles of the right upper extremity where there is significant muscle atrophy in the forearm and hand (split hand phenomenon is present). Even though there remains a suspicion for motor neuron disease, electrophysiologic criteria are not met at this time. Similarly, the neurological exam is abnormal but not definitively convincing for motor neuron disease. The physical changes that are diabetes-related confound the exam. Close clinical surveillance and evaluation for mimicking diseases are recommended.     ASSESSMENT: 57 y.o. year old male with pain, consistent with:    Encounter Diagnoses   Name Primary?    Cervical radiculopathy Yes    DDD (degenerative disc disease), cervical          DISCUSSION: Mr. Morris comes to me as part of the  [facility]. He was previously worked up by Dr. Mary for cervical radiculopathy. MRI shows a C6/7 foraminal protrusion with possible impingement on the left C7 nerve root. He reports not being able to carry heavy things with that arm, but there is no obvious unilateral weakness on exam. He does have interosseous weakness bilaterally. Exam does indicate pain with ROM of the neck and reproduction of symptoms when he leans his head left but does not explain his interossei weakness. EMG was not diagnostic for motor neuron disease. He is 50% improved after JUAN PABLO.     PLAN:  Referred to PT  Start Lyrica 50mg qhs x 1 week then BID   Continue Celecoxib   Follow up with Neurology regarding EMG  RTC 6- 8 weeks to adjust Lyrica and consider repeat JUAN PABLO    AVTAR ZULUAGA  07/05/2023

## 2023-07-11 ENCOUNTER — OFFICE VISIT (OUTPATIENT)
Dept: PODIATRY | Facility: CLINIC | Age: 58
End: 2023-07-11
Payer: COMMERCIAL

## 2023-07-11 VITALS — BODY MASS INDEX: 29.47 KG/M2 | WEIGHT: 242 LBS | HEIGHT: 76 IN

## 2023-07-11 DIAGNOSIS — B35.3 TINEA PEDIS OF BOTH FEET: ICD-10-CM

## 2023-07-11 DIAGNOSIS — E11.9 COMPREHENSIVE DIABETIC FOOT EXAMINATION, TYPE 2 DM, ENCOUNTER FOR: Primary | ICD-10-CM

## 2023-07-11 DIAGNOSIS — L60.1 ONYCHOLYSIS OF TOENAIL: ICD-10-CM

## 2023-07-11 DIAGNOSIS — B35.1 ONYCHOMYCOSIS DUE TO DERMATOPHYTE: ICD-10-CM

## 2023-07-11 PROCEDURE — 99213 PR OFFICE/OUTPT VISIT, EST, LEVL III, 20-29 MIN: ICD-10-PCS | Mod: S$GLB,,, | Performed by: PODIATRIST

## 2023-07-11 PROCEDURE — 99999 PR PBB SHADOW E&M-EST. PATIENT-LVL IV: CPT | Mod: PBBFAC,COE,, | Performed by: PODIATRIST

## 2023-07-11 PROCEDURE — 99213 OFFICE O/P EST LOW 20 MIN: CPT | Mod: S$GLB,,, | Performed by: PODIATRIST

## 2023-07-11 PROCEDURE — 99999 PR PBB SHADOW E&M-EST. PATIENT-LVL IV: ICD-10-PCS | Mod: PBBFAC,COE,, | Performed by: PODIATRIST

## 2023-07-11 NOTE — PATIENT INSTRUCTIONS
Over the counter pain creams: Voltaren Gel, Biofreeze, Bengay, tiger balm, two old goat, lidocaine gel,  Absorbine Veterinary Liniment Gel Topical Analgesic Sore Muscle and Joint Pain Relief  Recommend lotions: eucerin, eucerin for diabetics, aquaphor, A&D ointment, gold bond for diabetics, sween, Fedscreek's Bees all purpose baby ointment,  urea 40 with aloe or SkinIntegra rapid crack repair (found on amazon.com)  Shoe recommendations: (try 6pm.com, zappos.com , nordstromrack.Dopios, or shoes.com for discounted prices) you can visit varsity shoes in [place], DSW shoes in [place]  or deion rack in the [facility] (there are also several shoe brand outlets in the [facility])  ONLY purchase stability style tennis shoes NO flex, foam, free, yoga mat style shoes  Asics (GT 4000 or gel foundations), new balance stability type shoes (such as the 940 series), saucony (stabil c3),  Bejarano (GTS or Beast or transcend), propet, HokaOne (tennis shoe) Beto (tennis shoes and boots)  Sofft Brand (women) Meredith&Hilton (men), clarks, crocs, aerosoles, naturalizers, SAS, ecco, born, janae spencer, rockports (dress shoes)  Vionic, burkenstocks, fitflops, propet, taos, baretraps (sandals)  HokaOne sandals, crocs, propet (house shoes)    Nail Home remedy:  Vicks Vapor rub or Emuaid to nails for easier manageability    Diabetes: Inspecting Your Feet  Diabetes increases your chances of developing foot problems. So inspect your feet every day. This helps you find small skin irritations before they become serious infections. If you have trouble seeing the bottoms of your feet, use a mirror or ask a family member or friend to help.     Pressure spots on the bottom of the foot are common areas where problems develop.   How to check your feet  Below are tips to help you look for foot problems. Try to check your feet at the same time each day, such as when you get out of bed in the morning:  Check the top of each foot. The tops of toes, back of  the heel, and outer edge of the foot can get a lot of rubbing from poor-fitting shoes.  Check the bottom of each foot. Daily wear and tear often leads to problems at pressure spots.  Check the toes and nails. Fungal infections often occur between toes. Toenail problems can also be a sign of fungal infections or lead to breaks in the skin.  Check your shoes, too. Loose objects inside a shoe can injure the foot. Use your hand to feel inside your shoes for things like audra, loose stitching, or rough areas that could irritate your skin.  Warning signs  Look for any color changes in the foot. Redness with streaks can signal a severe infection, which needs immediate medical attention. Tell your doctor right away if you have any of these problems:  Swelling, sometimes with color changes, may be a sign of poor blood flow or infection. Symptoms include tenderness and an increase in the size of your foot.  Warm or hot areas on your feet may be signs of infection. A foot that is cold may not be getting enough blood.  Sensations such as burning, tingling, or pins and needles can be signs of a problem. Also check for areas that may be numb.  Hot spots are caused by friction or pressure. Look for hot spots in areas that get a lot of rubbing. Hot spots can turn into blisters, calluses, or sores.  Cracks and sores are caused by dry or irritated skin. They are a sign that the skin is breaking down, which can lead to infection.  Toenail problems to watch for include nails growing into the skin (ingrown toenail) and causing redness or pain. Thick, yellow, or discolored nails can signal a fungal infection.  Drainage and odor can develop from untreated sores and ulcers. Call your doctor right away if you notice white or yellow drainage, bleeding, or unpleasant odor.   © 7527-1510 The "[employer]". [address] [place] [address], [place] [address]. All rights reserved. This information is not intended as a substitute for  professional medical care. Always follow your healthcare professional's instructions.        Step-by-Step:  Inspecting Your Feet (Diabetes)    Date Last Reviewed: 10/1/2016  © 6535-7199 [employer]. [address] [place] [address], [place] [address]. All rights reserved. This information is not intended as a substitute for professional medical care. Always follow your healthcare professional's instructions.

## 2023-07-12 NOTE — PROGRESS NOTES
Subjective:      Patient ID: Abel Morris is a 57 y.o. male.    Chief Complaint: Diabetes Mellitus (Sae Starkey MD at 4/11/2023) and Nail Care    Abel Morris is a 57 y.o. male with  has a past medical history of Arthritis, Diabetes mellitus type II, uncontrolled, Diabetes mellitus with neurological manifestations, uncontrolled, ED (erectile dysfunction), Eye injury, HDL lipoprotein deficiency, History of colonic polyps, Hyperlipidemia, Poor compliance, and Primary hypertension. presents to the podiatry clinic for care of  right foot ulcer.   Location: toes Onset of the symptoms was several weeks ago. Precipitating event: none known.  History of injury: no Current symptoms include: redness and swelling. Signs of infection denies nausea, vomiting, fever, chills   Symptoms have progressed to a point and plateaued. Patient has had no prior foot problems. Evaluation to date:  seen in ED . Treatment to date: Cleocin. Patients rates pain 0/10 on pain scale.    02/17/2021 Abel Morris is a 57 y.o. male returns to clinic for follow up of  right foot ulcers.  Patient has left football dressing clean, dry, intact.  Patient denies pain. No new pedal complaints.    02/24/2021   returns to clinic for follow up of  right foot ulcers.  Patient has left football dressing clean, dry, intact.  Patient denies pain. No new pedal complaints.    03/03/2021 returns to clinic for follow up of  right foot ulcers.  Patient has left football dressing clean, dry, intact.  Patient denies pain. No new pedal complaints. Admits to walking without darco shoe in the home    03/31/2021  returns to clinic for follow up of  right foot ulcers. Relates he has been caring for foot as instructed and it appears to have remained healed.  He is still off work and awaiting a vascular appointment.  He relates there was a miscommunication on his [facility] paperwork. Presents in [facility]    07/21/2021 The patient's chief  complaint is elongated, thickened toenails aggravated by increased weight bearing, shoe gear, pressure.    03/28/2023  patient relates that he went trim toenail of right hallux yesterday and entire nail came off while trimming.  No drainage, no pain.  Denies trauma.     7/11/2023 patient returns to clinic  for follow up of lysed nail which had bleeding on last encounter.  Subjective improvement with treatment as advised.  No new pedal complaints.     Chief Complaint   Patient presents with    Diabetes Mellitus     Sae Starkey MD at 4/11/2023    Nail Care       Hemoglobin A1C   Date Value Ref Range Status   04/26/2023 7.1 (H) 4.0 - 5.6 % Final     Comment:     ADA Screening Guidelines:  5.7-6.4%  Consistent with prediabetes  >or=6.5%  Consistent with diabetes    High levels of fetal hemoglobin interfere with the HbA1C  assay. Heterozygous hemoglobin variants (HbS, HgC, etc)do  not significantly interfere with this assay.   However, presence of multiple variants may affect accuracy.     03/03/2023 9.4 (H) 4.0 - 5.6 % Final     Comment:     ADA Screening Guidelines:  5.7-6.4%  Consistent with prediabetes  >or=6.5%  Consistent with diabetes    High levels of fetal hemoglobin interfere with the HbA1C  assay. Heterozygous hemoglobin variants (HbS, HgC, etc)do  not significantly interfere with this assay.   However, presence of multiple variants may affect accuracy.     10/09/2021 6.8 (H) 4.0 - 5.6 % Final     Comment:     ADA Screening Guidelines:  5.7-6.4%  Consistent with prediabetes  >or=6.5%  Consistent with diabetes    High levels of fetal hemoglobin interfere with the HbA1C  assay. Heterozygous hemoglobin variants (HbS, HgC, etc)do  not significantly interfere with this assay.   However, presence of multiple variants may affect accuracy.             Patient Active Problem List   Diagnosis    ED (erectile dysfunction)    Hyperlipidemia    Diabetes mellitus type II, uncontrolled    Poor compliance    HDL  "lipoprotein deficiency    Medial epicondylitis    Diabetes mellitus with neurological manifestations, uncontrolled    Overweight (BMI 25.0-29.9)    Varicose veins of lower extremities with inflammation    Blood in stool    History of colonic polyps    Diabetic polyneuropathy associated with type 2 diabetes mellitus    Colon cancer screening    Polyneuropathy    Refractive error    Acute pulmonary embolism witho right heart strain    Type 2 diabetes mellitus with diabetic neuropathy, without long-term current use of insulin    Hypokalemia    Primary hypertension    Obesity (BMI 30.0-34.9)    Cervical spondylosis    DDD (degenerative disc disease), cervical    Cervical radiculopathy    Long-term insulin use    History of pulmonary embolus (PE)    Ulnar neuropathy of both upper extremities       Current Outpatient Medications on File Prior to Visit   Medication Sig Dispense Refill    blood sugar diagnostic Strp To check BG 2 times daily, to use with insurance preferred meter 200 each 3    celecoxib (CELEBREX) 200 MG capsule Take 1 capsule (200 mg total) by mouth 2 (two) times daily. 30 capsule 0    dulaglutide (TRULICITY) 1.5 mg/0.5 mL pen injector Inject 1.5 mg into the skin every 7 days. 4 pen 5    glimepiride (AMARYL) 2 MG tablet Take 1 tablet (2 mg total) by mouth before breakfast. 90 tablet 2    insulin glargine, TOUJEO, (TOUJEO SOLOSTAR U-300 INSULIN) 300 unit/mL (1.5 mL) InPn pen Inject 24 units once daily 6 pen 12    lancets Misc To check BG 2 times daily, to use with insurance preferred meter 200 each 3    metFORMIN (GLUCOPHAGE) 1000 MG tablet Take 1 tablet by mouth twice daily 180 tablet 3    multivitamin (THERAGRAN) per tablet Take 1 tablet by mouth once daily.      pen needle, diabetic (BD ULTRA-FINE SHORT PEN NEEDLE) 31 gauge x 5/16" Ndle USE  ONCE DAILY 100 each 0    pravastatin (PRAVACHOL) 40 MG tablet Take 1 tablet (40 mg total) by mouth once daily. 90 tablet 0    pregabalin (LYRICA) 50 MG capsule Take " 1 capsule (50 mg total) by mouth 2 (two) times daily. 60 capsule 1    sildenafil (REVATIO) 20 mg Tab 1-5 pills at a time per day prn 90 tablet 1    TRUEPLUS LANCETS 33 gauge Misc USE 1 TO CHECK GLUCOSE TWICE DAILY      aspirin (ECOTRIN) 81 MG EC tablet Take 1 tablet (81 mg total) by mouth once daily.  0    blood-glucose meter kit To check BG 3 times daily, to use with insurance preferred meter 1 each 0     Current Facility-Administered Medications on File Prior to Visit   Medication Dose Route Frequency Provider Last Rate Last Admin    0.9%  NaCl infusion  500 mL Intravenous Continuous Felton White MD           Review of patient's allergies indicates:  No Known Allergies    Past Surgical History:   Procedure Laterality Date    COLONOSCOPY N/A 8/2/2016    Procedure: COLONOSCOPY;  Surgeon: Miguel Persaud MD;  Location: [facility] ENDO;  Service: Endoscopy;  Laterality: N/A;    COLONOSCOPY N/A 9/3/2019    Procedure: COLONOSCOPY;  Surgeon: Álvaro Carmichael MD;  Location: [facility] ENDO;  Service: Endoscopy;  Laterality: N/A;  confirmed appt- sp    EPIDURAL STEROID INJECTION N/A 5/16/2023    Procedure: INJECTION, STEROID, EPIDURAL, C7-T1 (LEFT SIDE) ECEN;  Surgeon: Allie Torres MD;  Location: [facility];  Service: Pain Management;  Laterality: N/A;    LUMBAR PUNCTURE N/A 09/19/2019       Family History   Problem Relation Age of Onset    No Known Problems Mother     Colon polyps Father 61    No Known Problems Sister     No Known Problems Brother     No Known Problems Maternal Aunt     No Known Problems Maternal Uncle     No Known Problems Paternal Aunt     No Known Problems Paternal Uncle     Diabetes Maternal Grandmother     No Known Problems Maternal Grandfather     No Known Problems Paternal Grandmother     No Known Problems Paternal Grandfather     Amblyopia Neg Hx     Blindness Neg Hx     Cancer Neg Hx     Cataracts Neg Hx     Glaucoma Neg Hx     Hypertension Neg Hx     Macular degeneration Neg Hx     Retinal  detachment Neg Hx     Strabismus Neg Hx     Stroke Neg Hx     Thyroid disease Neg Hx        Social History     Socioeconomic History    Marital status:    Tobacco Use    Smoking status: Former     Types: Cigars    Smokeless tobacco: Never   Substance and Sexual Activity    Alcohol use: Yes     Alcohol/week: 6.0 standard drinks     Types: 6 Cans of beer per week     Comment: per week    Drug use: No    Sexual activity: Not Currently     Social Determinants of Health     Financial Resource Strain: Low Risk     Difficulty of Paying Living Expenses: Not hard at all   Food Insecurity: No Food Insecurity    Worried About Running Out of Food in the Last Year: Never true    Ran Out of Food in the Last Year: Never true   Transportation Needs: No Transportation Needs    Lack of Transportation (Medical): No    Lack of Transportation (Non-Medical): No   Physical Activity: Inactive    Days of Exercise per Week: 0 days    Minutes of Exercise per Session: 0 min   Stress: No Stress Concern Present    Feeling of Stress : Not at all   Social Connections: Unknown    Frequency of Communication with Friends and Family: More than three times a week    Frequency of Social Gatherings with Friends and Family: More than three times a week    Active Member of Clubs or Organizations: Yes    Attends Club or Organization Meetings: More than 4 times per year    Marital Status:    Housing Stability: High Risk    Unable to Pay for Housing in the Last Year: Yes    Number of Places Lived in the Last Year: 1    Unstable Housing in the Last Year: No       Review of Systems   Constitutional: Negative for chills and fever.   Cardiovascular:  Positive for leg swelling. Negative for chest pain and claudication.   Respiratory:  Negative for cough and shortness of breath.    Skin:  Positive for dry skin, nail changes and suspicious lesions. Negative for itching and rash.   Musculoskeletal:  Positive for joint pain, muscle cramps (nocturnal) and  "myalgias. Negative for back pain, falls, joint swelling and muscle weakness.   Gastrointestinal:  Negative for diarrhea, nausea and vomiting.   Neurological:  Positive for numbness and paresthesias. Negative for tremors and weakness.   Psychiatric/Behavioral:  Negative for altered mental status and hallucinations.          Objective:      Vitals:    07/11/23 1016   Weight: 109.8 kg (242 lb)   Height: 6' 4" (1.93 m)   PainSc: 0-No pain       Physical Exam  Vitals and nursing note reviewed.   Constitutional:       General: He is not in acute distress.     Appearance: He is well-developed. He is not toxic-appearing or diaphoretic.      Comments: alert and oriented x 3.    Cardiovascular:      Pulses:           Dorsalis pedis pulses are 1+ on the right side and 1+ on the left side.        Posterior tibial pulses are 2+ on the right side and 2+ on the left side.      Comments: There is decreased digital hair. Skin is atrophic, slightly hyperpigmented  Pulmonary:      Effort: No respiratory distress.   Musculoskeletal:         General: No deformity.      Right lower leg: Edema present.      Left lower leg: Edema present.      Right ankle: No tenderness. No lateral malleolus, medial malleolus, AITF ligament, CF ligament or posterior TF ligament tenderness.      Right Achilles Tendon: No defects. Morris's test negative.      Left ankle: No tenderness. No lateral malleolus, medial malleolus, AITF ligament, CF ligament or posterior TF ligament tenderness.      Left Achilles Tendon: No defects. Morris's test negative.      Right foot: No tenderness or bony tenderness.      Left foot: No tenderness or bony tenderness.      Comments: Decreased first MPJ range of motion both weightbearing and nonweightbearing, no crepitus observed the first MP joint, + dorsal flag sign. Mild  bunion deformity is observed .    Patient has hammertoes of digits 2-5 bilateral partially reducible     Fat pad atrophy to heels and met heads " bilateral    Muscle strength is 5/5 in all groups bilaterally.           Feet:      Right foot:      Protective Sensation: 10 sites tested.  5 sites sensed.      Left foot:      Protective Sensation: 10 sites tested.  3 sites sensed.      Skin integrity: Ulcer present.   Lymphadenopathy:      Comments: No lymphatic streaking     Skin:     General: Skin is warm and dry.      Coloration: Skin is not pale.      Findings: No rash.      Nails: There is no clubbing.      Comments:   Toenails 1-5 bilaterally are elongated by 2-3 mm, thickeneby 2-3 mm, discolored/yellowed, dystrophic, brittle with subungual debris.    Scaling dryness in a moccasin distribution is noted to the bilateral lower extremities with associated erythema.     Neurological:      Sensory: Sensory deficit present.      Motor: No atrophy.      Comments: Decreased/absent vibratory sensation bilateral feet to 128Hz tuning fork.     Psychiatric:         Attention and Perception: He is attentive.         Mood and Affect: Mood is not anxious. Affect is not inappropriate.         Speech: He is communicative. Speech is not slurred.         Behavior: Behavior is not combative.       03/28/2023       Assessment:       Encounter Diagnoses   Name Primary?    Comprehensive diabetic foot examination, type 2 DM, encounter for Yes    Onycholysis of toenail     Onychomycosis due to dermatophyte     Tinea pedis of both feet            Plan:     Problem List Items Addressed This Visit    None  Visit Diagnoses       Comprehensive diabetic foot examination, type 2 DM, encounter for    -  Primary    Onycholysis of toenail        Onychomycosis due to dermatophyte        Tinea pedis of both feet                      I counseled the patient on his conditions, their implications and medical management.    Education about the diabetic foot, neuropathy, and prevention of limb loss.    Shoe inspection. Diabetic Foot Education. Patient reminded of the importance of good  nutrition/healthy diet/weight management and blood sugar control to help prevent podiatric complications of diabetes. Patient instructed on proper foot hygeine. Wear comfortable, proper fitting shoes. Wash feet daily. Dry well. After drying, apply moisturizer to feet (no lotion to webspaces). Inspect feet daily for skin breaks, blisters, swelling, or redness. Wear cotton socks (preferably white)  Change socks every day. Do NOT walk barefoot. Do NOT use heating pads or hot water soaks. We discussed wearing proper shoe gear, daily foot inspections, never walking without protective shoe gear.     Discussed edema control and the importance of daily moisturizer to the feet such as Gold bonds diabetic foot cream    Recommend applying vicks vaporub to thick abnormal toenails daily x 6 months to treat fungal nail infection.    Informed patient that many nail problems can be prevented by wearing the right shoes and trimming your nails properly.   The right shoes: Patient is to wear shoes that are supportive and roomy enough for toes to wiggle. Look for shoes made of natural materials such as leather, which allow  feet to breathe.   Proper trimming: To avoid problems, he was instructed to trim toenails straight across without cutting down into the corners.     Instructed patient on the importance of keeping feet dry. Patient instructed to use absorbent cotton socks and change them if they become sweaty; or wear an open-toe shoe or sandal. Wash the feet at least once a day with soap and water. Apply the antifungal as discussed. Instructed patient that it takes time for symptoms to completely dissipate. Patient instructed to use lysol or over-the-counter antifungal powders or sprays to shoes daily and allow them to air dry, switching shoes from every other day would be optimal. Patient is to avoid barefoot walking in  high-risk environments (public showers, gyms and locker rooms) may prevent future infections.     Patient to  RTC if:  Increasing redness or swelling of the foot   Pus draining from cracks in the skin   Fever of 100.4ºF (38ºC) or higher    He will continue to monitor the areas daily, inspect his feet, wear protective shoe gear when ambulatory, moisturizer to maintain skin integrity and follow in this office in approximately 6-12 months, sooner p.r.n. or as proc B

## 2023-07-19 NOTE — PROGRESS NOTES
Neurology Consult Note    Chief Complaint: bilateral leg weakness    HPI:   Abel Morris is a 53 y.o. male with medical conditions as outlined below who presents for further evaluation of bilateral leg weakness. He states that he has had diabetes for 17 years and was recently started on insulin as it was poorly controlled. His last A1C was 9.2. He states he drinks 6 cans of beer daily. He reports in February of this year, he noticed numbness in his feet and this slowly progressed to involve cramping and tightening pain in his calves. He feels the pain at rest as well, but it is worse when walking. He denies neck or back pain or recent injury. He denies bowel or bladder problems. He feels he now has weakness in his legs. He denies pain in his thighs. He denies a family history of neuropathy or myopathy. He has no further complaints.     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    History of colonic polyps 8/17/2018    Hyperlipidemia 11/28/2012    Poor compliance 1/8/2014       Past Surgical History:  Past Surgical History:   Procedure Laterality Date    COLONOSCOPY N/A 8/2/2016    Performed by Miguel Persaud MD at [facility]       Social History:  Social History     Socioeconomic History    Marital status:      Spouse name: Not on file    Number of children: Not on file    Years of education: Not on file    Highest education level: Not on file   Occupational History    Not on file   Social Needs    Financial resource strain: Somewhat hard    Food insecurity:     Worry: Never true     Inability: Never true    Transportation needs:     Medical: No     Non-medical: No   Tobacco Use    Smoking  status: Current Every Day Smoker     Types: Cigars    Smokeless tobacco: Never Used   Substance and Sexual Activity    Alcohol use: Yes     Alcohol/week: 3.6 oz     Types: 6 Cans of beer per week     Frequency: 4 or more times a week     Drinks per session: 10 or more     Binge frequency: Daily or almost daily     Comment: per week    Drug use: No    Sexual activity: Not on file   Lifestyle    Physical activity:     Days per week: 4 days     Minutes per session: Not on file    Stress: Not at all   Relationships    Social connections:     Talks on phone: More than three times a week     Gets together: More than three times a week     Attends [employer] service: Not on file     Active member of club or organization: Yes     Attends meetings of clubs or organizations: More than 4 times per year     Relationship status:    Other Topics Concern    Not on file   Social History Narrative    Not on file       Family History:  Family History   Problem Relation Age of Onset    No Known Problems Mother     Colon polyps Father 61    No Known Problems Sister     No Known Problems Brother     No Known Problems Maternal Aunt     No Known Problems Maternal Uncle     No Known Problems Paternal Aunt     No Known Problems Paternal Uncle     Diabetes Maternal Grandmother     No Known Problems Maternal Grandfather     No Known Problems Paternal Grandmother     No Known Problems Paternal Grandfather     Amblyopia Neg Hx     Blindness Neg Hx     Cancer Neg Hx     Cataracts Neg Hx     Glaucoma Neg Hx     Hypertension Neg Hx     Macular degeneration Neg Hx     Retinal detachment Neg Hx     Strabismus Neg Hx     Stroke Neg Hx     Thyroid disease Neg Hx        Medications:  Current Outpatient Medications   Medication Sig Dispense Refill    gabapentin (NEURONTIN) 300 MG capsule Take 1 capsule (300 mg total) by mouth 3 (three) times daily. 90 capsule 11    GEMFIBROZIL ORAL       glimepiride (AMARYL) 4 MG  "tablet Take 1 tablet (4 mg total) by mouth daily with breakfast. 90 tablet 0    insulin glargine, TOUJEO, (TOUJEO SOLOSTAR U-300 INSULIN) 300 unit/mL (1.5 mL) InPn pen Inject 40 units once daily 3 Syringe 0    metFORMIN (GLUCOPHAGE) 1000 MG tablet 1 Tablet Oral Twice a day 180 tablet 12    pen needle, diabetic (BD ULTRA-FINE SHORT PEN NEEDLE) 31 gauge x 5/16" Ndle Use 1x/day 100 each 3    polyethylene glycol (COLYTE) 240-22.72-6.72 -5.84 gram SolR TAKE 4000 MLS BY MOUTH ONCE FOR 1 DOSE  0    sildenafil (REVATIO) 20 mg Tab 1-5 pills at a time per day prn 30 tablet 11    vardenafil (LEVITRA) 20 MG tablet Half to one qd prn 10 tablet 12    pravastatin (PRAVACHOL) 40 MG tablet Take 1 tablet (40 mg total) by mouth once daily. 90 tablet 12     No current facility-administered medications for this visit.        Allergies:  Review of patient's allergies indicates:  No Known Allergies    ROS:  A 12 point review of system was negative aside from pertinent positives and negatives as outlined above.    Physical Exam  Vitals:    08/20/19 0803   BP: 126/83   Pulse: 89       General: well nourished, well developed  Eyes: no scleral icterus   Nose: nasal turbinates intact  Neck: supple, ROM intact  Skin: no rash or ecchymosis  Joints: no swelling or erythema  Cardiac: regular rate and rhythm  Lungs: clear to auscultation bilaterally    Neuro:  Mental status: AAO x 3, no dysarthria, no aphasia, communicating appropriately  CN: PERRL, EOMI, VFF, V1-V3 sensation intact, no facial asymmetry, hearing grossly intact, tongue midline  Motor:   Deltoid R 5/5 L 5/5   Biceps R 5/5 L 5/5   Triceps R 5/5 L 5/5   Wrist flexion R 5/5 L 5/5   Wrist extension R 5/5 L 5/5   Finger abduction  R 5/5 L 5/5   Thumb abduction R 5/5 L 5/5     Hip flexion R 4+/5 L 4+/5   Knee extension R 5/5 L 5/5   Knee flexion R 5/5 L 5/5   Foot dorsiflexion R 5/5 L 5/5   Foot plantar flexion R 5/5 L 5/5   Foot inversion R 5/5 L 5/5   Foot eversion R 5/5 L 5/5 "       Normal bulk and tone    Reflexes: absent ankle and knee jerks, 1+ biceps and brachioradialis bilaterally, toes equivocal bilaterally  Sensory: decreased to pinprick up to mid shin bilaterally, position and vibration sense throughout  Coordination: no dysmetria on FTN  Gait: mildly unsteady    Prior Imaging/Labs:        Assessment and Plan:    53 y.o. male with numbness in feet and cramping in legs likely 2/2 a generalized polyneuropathy 2/2 diabetes vs alcohol use. However, given that symptoms started in February 2019, CIDP remains in the differential    1. Bilateral leg weakness  - CK; Future  - Ambulatory consult to Physical Therapy  - EMG W/ ULTRASOUND AND NERVE CONDUCTION TEST 2 Extremities; Future  C/w gabapentin for paresthesias        Patient was advised to notify me for worsening symptoms. I will see patient back after EMG/NCS or sooner if necessary.     Thank you for this consultation.    Arlene Barth DO  [facility]  [address] [facility] [address]  [place] [id number] [phone]                  Complex Repair And Z Plasty Text: The defect edges were debeveled with a #15 scalpel blade.  The primary defect was closed partially with a complex linear closure.  Given the location of the remaining defect, shape of the defect and the proximity to free margins a Z plasty was deemed most appropriate for complete closure of the defect.  Using a sterile surgical marker, an appropriate advancement flap was drawn incorporating the defect and placing the expected incisions within the relaxed skin tension lines where possible. The area thus outlined was incised deep to adipose tissue with a #15 scalpel blade. The skin margins were undermined to an appropriate distance in all directions utilizing iris scissors and carried over to close the primary defect.

## 2023-07-19 NOTE — PROGRESS NOTES
Therapy Diagnosis:   Encounter Diagnoses   Name Primary?    Cervical radiculopathy     DDD (degenerative disc disease), cervical     Neck pain on left side     Painful cervical ROM     Acute pain of left shoulder     Poor posture     Impaired functional mobility and activity tolerance      Physician: Felton White MD

## 2023-07-21 ENCOUNTER — CLINICAL SUPPORT (OUTPATIENT)
Dept: REHABILITATION | Facility: OTHER | Age: 58
End: 2023-07-21
Payer: COMMERCIAL

## 2023-07-21 DIAGNOSIS — R29.3 POOR POSTURE: ICD-10-CM

## 2023-07-21 DIAGNOSIS — M54.12 CERVICAL RADICULOPATHY: ICD-10-CM

## 2023-07-21 DIAGNOSIS — M25.512 ACUTE PAIN OF LEFT SHOULDER: ICD-10-CM

## 2023-07-21 DIAGNOSIS — M50.30 DDD (DEGENERATIVE DISC DISEASE), CERVICAL: ICD-10-CM

## 2023-07-21 DIAGNOSIS — M54.2 NECK PAIN ON LEFT SIDE: ICD-10-CM

## 2023-07-21 DIAGNOSIS — Z74.09 IMPAIRED FUNCTIONAL MOBILITY AND ACTIVITY TOLERANCE: ICD-10-CM

## 2023-07-21 DIAGNOSIS — M54.2 PAINFUL CERVICAL ROM: ICD-10-CM

## 2023-07-21 PROCEDURE — 97163 PT EVAL HIGH COMPLEX 45 MIN: CPT | Mod: PN

## 2023-07-21 PROCEDURE — 97110 THERAPEUTIC EXERCISES: CPT | Mod: PN

## 2023-07-21 NOTE — PLAN OF CARE
[facility]  Physical Therapy Initial Evaluation    Name: Abel Morris  Clinic Number: [id number]      Therapy Diagnosis:   Encounter Diagnoses   Name Primary?    Cervical radiculopathy     DDD (degenerative disc disease), cervical     Neck pain on left side     Painful cervical ROM     Acute pain of left shoulder     Poor posture     Impaired functional mobility and activity tolerance      Physician: Felton White MD    Physician Orders: PT Eval and Treat   Medical Diagnosis from Referral: Cervical radiculopathy (M54.12); DDD (degenerative disc disease), cervical (M50.30)  Evaluation Date: 7/21/2023  Authorization Period Expiration: 7/4/2024  Plan of Care Expiration: 10/13/2023  Visit # / Visits authorized: 1/ 1  Re-assessment: due 8/21/2023  FOTO: 7/19/2023 (1/5; 1)     Time In: 8:21 am  Time Out: 9:00 am  Total Billable Time: 10 minutes    Precautions: Standard, Diabetes, and HTN    Subjective   Date of onset: 3+ months  History of current condition - Abel reports: pain management wants him to start up therapy.  He endorses loss of strength in both arms and hands.  He notes a herniated disc in the neck that is contributing to symptoms.   At his last visit, he reports an improvement in strength so he was told to start PT.  He has not been back to work due to the weakness. Insidious onset of pain. Denies prior neck pain.  Endorses difficulty sleeping.  Denies driving deficits.      Burning, tingling, numbness: yes, 4th and 5th digit on left, entire finger  Falls in the last 6 months: no  R Hand dominant:   Difficulty with fine motor skills (buttons, zippers, grasping/holding objects, etc): Yes    Patient denies dizziness, headaches, blurry vision, nausea, vomiting, impaired sensations in groin and saddle region and changes in bowel/bladder.        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    Medications:   Abel has a current medication list which includes the following prescription(s): aspirin, blood sugar diagnostic, blood-glucose meter, celecoxib, trulicity, glimepiride, toujeo solostar u-300 insulin, lancets, metformin, multivitamin, pen needle, diabetic, pravastatin, pregabalin, sildenafil, and trueplus lancets, and the following Facility-Administered Medications: sodium chloride 0.9%.    Allergies:   Review of patient's allergies indicates:  No Known Allergies     Imaging, MRI studies:    FINDINGS:  MRI brain: Several punctate foci of T2 FLAIR signal hyperintensity supratentorial white matter predominant clustered in the frontal lobes which are nonspecific and sequela migraine headaches to be included in differential.  There is more confluent focus of T2 FLAIR signal hyperintensity in the right frontal periventricular white matter and extending to the margin of the anterior body corpus callosum which may be sequela of remote vascular event or prior demyelination..  There is no restricted diffusion to suggest acute or recent infarction.  Major intracranial T2 flow voids are present.  There is no abnormal parenchymal enhancement.     MRI cervical spine: Cervical sagittal alignment is similar to prior.  Cervical vertebral body heights contour and marrow signal is relatively stable without evidence for acute fracture or subluxation.  Continued  scattered degenerative disc disease with mild disc desiccation height loss.  Craniocervical junction within normal limits.  Cerebellar tonsils appropriately located     Cervical spinal cord stable in signal and contour no cord signal abnormality to suggest edema.  No abnormal intrathecal enhancement.     C2/C3: Posterior disc osteophyte with uncovertebral joint hypertrophy and facet joint arthropathy without significant central canal stenosis with mild left greater right neural foraminal stenosis.     C3/C4: Posterior disc osteophyte with uncovertebral joint hypertrophy and facet arthropathy without significant central canal stenosis and mild right greater than left neural foraminal stenosis.     C4/C5: Posterior disc osteophyte with uncovertebral joint hypertrophy and facet arthropathy mild central canal stenosis without significant neural foraminal stenosis.     C5/C6: Posterior disc osteophyte with uncovertebral joint hypertrophy and facet arthropathy mild central canal stenosis without significant neural foraminal stenosis.     C6/C7: Posterior disc osteophyte with uncovertebral joint hypertrophy and facet arthropathy previous left foraminal protrusion less apparent.  There is continued mild central canal stenosis with mild moderate left greater than right neural foraminal stenosis.     C7/T1: No significant disc bulge central canal or neural foraminal stenosis.     Impression:     MRI brain: T2 flair signal hyperintensity focus right frontal periventricular white matter extending to the margin of the anterior body/genu corpus callosum which is nonspecific and may be sequela of prior demyelination or prior vascular event.  Composed several scattered punctate foci of T2 FLAIR signal hyperintensity elsewhere supratentorial white matter which are also nonspecific sequela migraine headaches to be included in differential.  No evidence for acute infarction or enhancing lesion.     Clinical correlation and follow-up  advised     MRI cervical spine: Multilevel degenerative change cervical spine as detailed above previous left C6/C7 foraminal protrusion less apparent.  There is continued posterior disc osteophyte with uncovertebral joint hypertrophy and facet joint arthropathy at this level with mild central canal and mild moderate left greater than right neural foraminal stenosis.     No cord signal abnormality to suggest edema.  No abnormal intrathecal enhancement.     Please see above for additional details.    Prior Therapy: not for these complaints   Social History: lives with their spouse  Occupation: [employer], , not currently working due these complaints  Prior Level of Function: Pt independent with all ADLs and job responsibilities  Current Level of Function: difficulty with dressing, mowing the lawn, unable to work, difficulty with buttons/zippers, holding keys, difficulty sleeping, reaching/lifting overhead       Pain:  Current 3/10, worst 7/10, best 1/10   Location: left neck down into 4-5th digits   Description: Burning, Throbbing, Tingling, Numb, and Sharp  Aggravating Factors: Lifting and movements towards left, cervical motions  Easing Factors: pain medication and injection    Pts goals: rebuild strength in arms and hands to return to work and reduce pain.      Objective     Observation:   FHP; left thenar eminence atrophy    Palpation:  Denies TTP over cervical, thoracic and shoulder/periscapular musculature    Range of Motion:     Cervical    Flexion  23 P!    Extension  38 P!    R Rotation  58   L Rotation  48 P!    R Sidebending  20 P!    L Sidebending  20 P!        Shoulder Left AROM Right AROM   Flexion 130 P!  140   Abduction 90 P!  150   Internal Rotation T10 P! T9   External Rotation T1 P! And abnormal movement pattern T1   Elbow WFL WFL   Wrist WFL WFL     ! = pain    Strength/MMT:     Shoulder Left Right   Flexion 3+/5 P!  4/5   Abduction 3+/5 P!  4/5   Extension 4/5 4+/5   Internal  Rotation 5/5 4+/5   External Rotation 4/5 4/5   Elbow      Flexion 4/5 5/5   Extension 4-/5 5/5   Wrist     Flexion 4/5 5/5   Extension 4/5 5/5   Ulnar Deviation 4/5 5/5   Radial Deviation 4/5 5/5       Mid traps: left: 3/5; right: 4-/5  Rhomboids: left: 3-/5 P! ; right: 3+/5  Lower traps: left: 3-/5 P! ; right: 3-/5    : position 3; left; 70, 70, 70 lbs ; right: 80, 85, 80 lbs    ! = pain        Special Tests:     Spurling's Compression/Distraction (-)  Quadrant test: (+) flexion+ right rotation; (+) extension + left rotation; (+) extension    Ulnar nerve tension: (+)  Radial nerve tension: (-)  Median nerve tension: (-)      Joint Mobility:  Severe hypomobility in thoracic spine  Mod hypomobility in cervical spine      Other:     NDI: 38.0 (higher score = greater disability)    CMS Impairment/Limitation/Restriction for FOTO Neck Survey    Therapist reviewed FOTO scores for Abel Morris on 7/21/2023.   FOTO documents entered into EPIC - see Media section.    Intake Score: 49%             TREATMENT   Treatment Time In: 8:50 am  Treatment Time Out: 9:00 am  Total Treatment time separate from Evaluation: 10 minutes    Abel received therapeutic exercises to develop ROM and posture for 10 minutes including:  HEP review, performance and education  Chin tucks  Scap retraction  Passive Cervical rotation supine     Home Exercises and Patient Education Provided    Education provided:   Patient was educated on initial evaluation findings and expectations as well as future PT services, procedures, and expectations for optimal compliance with therapy.     Written Home Exercises Provided: Yes, see patient instructions.     Assessment   Abel is a 57 y.o. male referred to outpatient Physical Therapy with a medical diagnosis of Cervical radiculopathy; DDD (degenerative disc disease), cervical. Pt presents with decreased ROM, strength, flexibility, positive special tests, poor posture and decreased joint mobility  causing increased pain and decreased participation with work, recreational and household duties as well as fine motor deficits.       Pt prognosis is Fair.   Pt will benefit from skilled outpatient Physical Therapy to address the deficits stated above and in the chart below, provide pt/family education, and to maximize pt's level of independence to return to PLOF.     Plan of care discussed with patient: Yes  Pt's spiritual, cultural and educational needs considered and patient is agreeable to the plan of care and goals as stated below:     Anticipated Barriers for therapy: significant strength loss and fine motor skill deficits on left; significant imaging findings    Medical Necessity is demonstrated by the following  History  Co-morbidities and personal factors that may impact the plan of care [] LOW: no personal factors / co-morbidities  [] MODERATE: 1-2 personal factors / co-morbidities  [x] HIGH: 3+ personal factors / co-morbidities    Moderate / High Support Documentation:   Co-morbidities affecting plan of care: arthritis, diabetes, HTN, difficulty sleeping, knowledge of current condition    Personal Factors:   age     Examination  Body Structures and Functions, activity limitations and participation restrictions that may impact the plan of care [] LOW: addressing 1-2 elements  [] MODERATE: 3+ elements  [x] HIGH: 4+ elements (please support below)    Moderate / High Support Documentation: posture, RANGE OF MOTION, strength, fine motor skills, inability to work, difficulty sleeping, household chores, reaching/lifting overhead     Clinical Presentation [] LOW: stable  [] MODERATE: Evolving  [x] HIGH: Unstable     Decision Making/ Complexity Score: high       Goals:    In 6 weeks,   Goal Status   Patient will be independent with HEP to promote improved therapy outcomes.  STG    2. Patient will require min VC from PT for proper scapular retraction in order to improve postural awareness. STG    3. Patient will  improve shoulder MMT to 4/5 to improve performance of functional tasks. STG    4. Patient will improve cervical ROM by 10 degrees to demonstrate improved functional mobility. STG      In 12 weeks,   Goal Status   5. Patient will be independent with progression of HEP for self maintenance of symptoms.  LTG    6. Patient will improve scapular MMT to 4/5 to improve performance of functional tasks. LTG    7. 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  LTG    8. Patient will improve NDI from 38 to 30 to demonstrate improved functional mobility.  LTG    9. Patient will report max pain of 4/10 to improve activity tolerance for work and household duties.  LTG          Plan   Plan of care Certification: 7/21/2023 to 10/13/2023.    Outpatient Physical Therapy 2 times weekly for 24 visits to include the following interventions: Cervical/Lumbar Traction, Manual Therapy, Moist Heat/ Ice, Neuromuscular Re-ed, Patient Education, Therapeutic Activities, Therapeutic Exercise, and dry needling.   Pt will be seen by a physical therapist minimally every 6th visit or every 30 days.      Lucie Millard, PT

## 2023-07-21 NOTE — Clinical Note
Hi Dr. White, Please review and sign the plan of care for Mr. Morris.  He is continuing to make great progress and has returned to work.  Thank you for your referral, Lucie Millard PT

## 2023-07-21 NOTE — PATIENT INSTRUCTIONS
Access Code: [email or web address]  URL: [email or web address]  Date: 07/21/2023  Prepared by: Lucie Millard    Exercises  - Supine Chin Tuck  - 1 x daily - 1 sets - 20 reps - 5 hold  - Seated Scapular Retraction  - 1 x daily - 1 sets - 20 reps - 5 second hold  - Supine Cervical Rotation Passive Unloaded  - 1 x daily - 1 sets - 10 reps - 5 hold

## 2023-07-25 NOTE — PROGRESS NOTES
[facility]   Physical Therapy Treatment Note     Name: Abel Morris  Clinic Number: [id number]    Therapy Diagnosis:   Encounter Diagnoses   Name Primary?    Neck pain on left side Yes    Painful cervical ROM     Acute pain of left shoulder     Poor posture     Impaired functional mobility and activity tolerance      Physician: Felton White MD    Visit Date: 7/28/2023    Physician Orders: PT Eval and Treat   Medical Diagnosis from Referral: Cervical radiculopathy (M54.12); DDD (degenerative disc disease), cervical (M50.30)  Evaluation Date: 7/21/2023  Authorization Period Expiration: 12/31/2023  Plan of Care Certification Period: 7/21/2023 - 10/13/2023  Visit # / Visits authorized: 1/ 12 (+ eval)     Progress Note Due: 8/21/2023   FOTO: 7/21/2023 (1/5; 1)  PTA Visit #: 0/5     Precautions: Standard, Diabetes, and HTN    Time In: 7:47 am  Time Out: 8:25 am  Total Billable Time: 36 minutes      SUBJECTIVE     Pt reports: he felt like the pain was better after last session.  He was not compliant with home exercise program.  Response to previous treatment: felt better after IE  Functional change: none    Pain: 3/10  Location: left neck down into 4-5th digits     OBJECTIVE     Objective Measures updated at progress report unless specified.       TREATMENT     Abel received the treatments listed in bold below:      received therapeutic exercises to develop strength and ROM for 26  minutes including:  Chin tucks 20x 5 second holds  + Cervical retraction 20x 5 second holds  + Supine pec stretch over towel roll x3 minutes  + Horizontal ABD over towel roll with RTB 2x 10  Scap retraction  Passive Cervical rotation supine     received the following manual therapy techniques: Soft tissue Mobilization were applied to the: neck for 00 minutes, including:     received the followingdynamic functional therapeutic activities to improve functional performance for 10  minutes, including:  +  Red putty (provided for home) x3 minutes  + Digi , green single digits and combined digits x3 minutes  + Hand , black 25 x30  Updated HEP review    received the following neuromuscular re-education activities to improve: Coordination and Posture for 00 minutes. The following activities were included:    PATIENT EDUCATION AND HOME EXERCISES     Home Exercises Provided and Patient Education Provided     Education provided:   - updated HEP  - exercise form and purpose    Written Home Exercises Provided: yes. Exercises were reviewed and Abel was able to demonstrate them prior to the end of the session.  Abel demonstrated good  understanding of the education provided. See EMR under Patient Instructions for exercises provided during therapy sessions    ASSESSMENT     Patient demonstrated good form with cervical retraction and chin tuck exercises requiring minimal cueing.  Initiated postural strengthening as well as wrist/ activities with red putty and RTB provided.  Review of updated HEP and new exercises today.  Consider trial of traction at next visit to reduce radicular symptoms.     Abel Is progressing well towards his goals.   Pt prognosis is Fair.     Pt will continue to benefit from skilled outpatient physical therapy to address the deficits listed in the problem list box on initial evaluation, provide pt/family education and to maximize pt's level of independence in the home and community environment.     Pt's spiritual, cultural and educational needs considered and pt agreeable to plan of care and goals.     Anticipated barriers to physical therapy: significant strength loss and fine motor skill deficits on left; significant imaging findings    Goals:      In 6 weeks,    Goal Status   Patient will be independent with HEP to promote improved therapy outcomes.  STG     2. Patient will require min VC from PT for proper scapular retraction in order to improve postural awareness. STG     3. Patient will  improve shoulder MMT to 4/5 to improve performance of functional tasks. STG     4. Patient will improve cervical ROM by 10 degrees to demonstrate improved functional mobility. STG        In 12 weeks,    Goal Status   5. Patient will be independent with progression of HEP for self maintenance of symptoms.  LTG     6. Patient will improve scapular MMT to 4/5 to improve performance of functional tasks. LTG     7. 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 Patient will improve NDI from 38 to 30 to demonstrate improved functional mobility.  LTG     9. Patient will report max pain of 4/10 to improve activity tolerance for work and household duties.  LTG          PLAN     Continue with established PT POC and progress per pt tolerance.      Plan of care Certification: 7/21/2023 to 10/13/2023.     Outpatient Physical Therapy 2 times weekly for 24 visits to include the following interventions: Cervical/Lumbar Traction, Manual Therapy, Moist Heat/ Ice, Neuromuscular Re-ed, Patient Education, Therapeutic Activities, Therapeutic Exercise, and dry needling.   Pt will be seen by a physical therapist minimally every 6th visit or every 30 days.    Lucie Millard, PT

## 2023-07-28 ENCOUNTER — CLINICAL SUPPORT (OUTPATIENT)
Dept: REHABILITATION | Facility: OTHER | Age: 58
End: 2023-07-28
Payer: COMMERCIAL

## 2023-07-28 DIAGNOSIS — M54.2 PAINFUL CERVICAL ROM: ICD-10-CM

## 2023-07-28 DIAGNOSIS — R29.3 POOR POSTURE: ICD-10-CM

## 2023-07-28 DIAGNOSIS — M25.512 ACUTE PAIN OF LEFT SHOULDER: ICD-10-CM

## 2023-07-28 DIAGNOSIS — M54.2 NECK PAIN ON LEFT SIDE: Primary | ICD-10-CM

## 2023-07-28 DIAGNOSIS — Z74.09 IMPAIRED FUNCTIONAL MOBILITY AND ACTIVITY TOLERANCE: ICD-10-CM

## 2023-07-28 PROCEDURE — 97110 THERAPEUTIC EXERCISES: CPT | Mod: PN

## 2023-07-28 NOTE — PATIENT INSTRUCTIONS
Access Code: [email or web address]  URL: [email or web address]  Date: 07/28/2023  Prepared by: Lucie Millard    Exercises  - Supine Chin Tuck  - 1 x daily - 1 sets - 20 reps - 5 hold  - Seated Scapular Retraction  - 1 x daily - 1 sets - 20 reps - 5 second hold  - Supine Cervical Rotation Passive Unloaded  - 1 x daily - 1 sets - 10 reps - 5 hold  - Finger Lumbricals with Putty  - 1 x daily - 3-5 x weekly - 2 sets - 10 reps  - Key Pinch with Putty  - 1 x daily - 3-5 x weekly - 2 sets - 10 reps  - Putty Squeezes  - 2-3 x daily - 1 sets - 1 reps - 3 minutes hold  - Supine Chest Stretch on Foam Roll  - 1 x daily - 1 sets - 1 reps - 3 minutes hold  - Supine Shoulder Horizontal Abduction with Resistance  - 1 x daily - 2 sets - 10 reps - 3-5 hold  - Supine Cervical Retraction with Towel  - 1 x daily - 1 sets - 20 reps - 3 seconds hold

## 2023-08-10 NOTE — PROGRESS NOTES
"[facility]   Physical Therapy Treatment Note     Name: Abel Morris  Clinic Number: [id number]    Therapy Diagnosis:   Encounter Diagnoses   Name Primary?    Neck pain on left side Yes    Painful cervical ROM     Acute pain of left shoulder     Poor posture     Impaired functional mobility and activity tolerance      Physician: Felton White MD    Visit Date: 8/11/2023    Physician Orders: PT Eval and Treat   Medical Diagnosis from Referral: Cervical radiculopathy (M54.12); DDD (degenerative disc disease), cervical (M50.30)  Evaluation Date: 7/21/2023  Authorization Period Expiration: 12/31/2023  Plan of Care Certification Period: 7/21/2023 - 10/13/2023  Visit # / Visits authorized: 3 (2/ 12)       Progress Note Due: 8/21/2023   FOTO: 7/21/2023 (2/5; 1)    PTA Visit #: 1/5     Precautions: Standard, Diabetes, and HTN    Time In: 8:30 am  Time Out: 9:15 am  Total Billable Time: 45 minutes    SUBJECTIVE   Pt reports: Not really having any symptoms this morning. States it takes a few hours after waking up before symptoms start    He was not compliant with home exercise program.  Response to previous treatment: "I felt good"  Functional change: none    Pain: 0/10  Location: left neck down into 4-5th digits     OBJECTIVE     Objective Measures updated at progress report unless specified.       TREATMENT     Abel received the treatments listed in bold below:      received therapeutic exercises to develop strength and ROM for 25 minutes including:  Chin tucks 20x 5 second holds  Cervical retraction 20x 5 second holds  Supine pec stretch over half foam x3 minutes  Horizontal ABD over half foam with RTB 2x 10  Scap retraction 20 x 5 sec hold  Passive Cervical rotation supine     received the following manual therapy techniques: Soft tissue Mobilization were applied to the: neck for 00 minutes, including:     received the followingdynamic functional therapeutic activities to improve " functional performance for 00 minutes, including:  Red putty (provided for home) x3 minutes  Digi , green single digits and combined digits x3 minutes  Hand , black 25 x30      received the following neuromuscular re-education activities to improve: Coordination and Posture for 00 minutes. The following activities were included:      Abel received intermittent mechanical traction to the cervical spine at a force of 14 pounds for a total of 10 minutes. Hold time of 40 seconds and rest time for 20 seconds.       PATIENT EDUCATION AND HOME EXERCISES     Home Exercises Provided and Patient Education Provided     Education provided:   - Continuance of HEP  - Mechanical traction    Written Home Exercises Provided: Patient instructed to cont prior HEP. Exercises were reviewed and Abel was able to demonstrate them prior to the end of the session.  Abel demonstrated good  understanding of the education provided. See EMR under Patient Instructions for exercises provided during therapy sessions    ASSESSMENT   Initiated mechanical traction today with no adverse effects. Continued with cervical and postural strengthening today. Will monitor pt response to session and progress as tolerated.    Abel Is progressing well towards his goals.   Pt prognosis is Fair.     Pt will continue to benefit from skilled outpatient physical therapy to address the deficits listed in the problem list box on initial evaluation, provide pt/family education and to maximize pt's level of independence in the home and community environment.     Pt's spiritual, cultural and educational needs considered and pt agreeable to plan of care and goals.     Anticipated barriers to physical therapy: significant strength loss and fine motor skill deficits on left; significant imaging findings    Goals:      In 6 weeks,    Goal Status   Patient will be independent with HEP to promote improved therapy outcomes.  STG    In Progress   2. Patient will  require min VC from PT for proper scapular retraction in order to improve postural awareness. STG    In Progress   3. Patient will improve shoulder MMT to 4/5 to improve performance of functional tasks. STG    In Progress   4. Patient will improve cervical ROM by 10 degrees to demonstrate improved functional mobility. STG    In Progress      In 12 weeks,    Goal Status   5. Patient will be independent with progression of HEP for self maintenance of symptoms.  LTG    In Progress   6. Patient will improve scapular MMT to 4/5 to improve performance of functional tasks. LTG    In Progress   7. 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 Patient will improve NDI from 38 to 30 to demonstrate improved functional mobility.  LTG    In Progress   9. Patient will report max pain of 4/10 to improve activity tolerance for work and household duties.  LTG    In Progress      PLAN   Plan of care Certification: 7/21/2023 to 10/13/2023.    Continue with established PT POC and progress per pt tolerance.      Outpatient Physical Therapy 2 times weekly for 24 visits to include the following interventions: Cervical/Lumbar Traction, Manual Therapy, Moist Heat/ Ice, Neuromuscular Re-ed, Patient Education, Therapeutic Activities, Therapeutic Exercise, and dry needling.   Pt will be seen by a physical therapist minimally every 6th visit or every 30 days.    Trini Morris III, PTA

## 2023-08-11 ENCOUNTER — CLINICAL SUPPORT (OUTPATIENT)
Dept: REHABILITATION | Facility: OTHER | Age: 58
End: 2023-08-11
Payer: COMMERCIAL

## 2023-08-11 ENCOUNTER — DOCUMENTATION ONLY (OUTPATIENT)
Dept: REHABILITATION | Facility: OTHER | Age: 58
End: 2023-08-11

## 2023-08-11 DIAGNOSIS — M25.512 ACUTE PAIN OF LEFT SHOULDER: ICD-10-CM

## 2023-08-11 DIAGNOSIS — Z74.09 IMPAIRED FUNCTIONAL MOBILITY AND ACTIVITY TOLERANCE: ICD-10-CM

## 2023-08-11 DIAGNOSIS — M54.2 PAINFUL CERVICAL ROM: ICD-10-CM

## 2023-08-11 DIAGNOSIS — R29.3 POOR POSTURE: ICD-10-CM

## 2023-08-11 DIAGNOSIS — M54.2 NECK PAIN ON LEFT SIDE: Primary | ICD-10-CM

## 2023-08-11 PROCEDURE — 97110 THERAPEUTIC EXERCISES: CPT | Mod: PN,CQ

## 2023-08-11 PROCEDURE — 97012 MECHANICAL TRACTION THERAPY: CPT | Mod: PN,CQ

## 2023-08-11 NOTE — PROGRESS NOTES
"[facility]   Physical Therapy Treatment Note     Name: Abel Morris  Clinic Number: [id number]    Therapy Diagnosis:   Encounter Diagnoses   Name Primary?    Neck pain on left side Yes    Painful cervical ROM     Acute pain of left shoulder     Poor posture     Impaired functional mobility and activity tolerance        Physician: Felton White MD    Visit Date: 8/14/2023    Physician Orders: PT Eval and Treat   Medical Diagnosis from Referral: Cervical radiculopathy (M54.12); DDD (degenerative disc disease), cervical (M50.30)  Evaluation Date: 7/21/2023  Authorization Period Expiration: 12/31/2023  Plan of Care Certification Period: 7/21/2023 - 10/13/2023  Visit # / Visits authorized: 4 (3/ 12)       Progress Note Due: 8/21/2023   FOTO: 7/21/2023 (3/5; 1)    PTA Visit #: 0/5     Precautions: Standard, Diabetes, and HTN    Time In: 8:50 am (late arrival)  Time Out: 9:16 am  Total Billable Time: 25 minutes    SUBJECTIVE   Pt reports: he felt like the traction helped the last visit.  He didn't feel as bad afterwards and would like to try again.      He was not compliant with home exercise program.  Response to previous treatment: "I felt good"  Functional change: none    Pain: 0/10  Location: left neck down into 4-5th digits     OBJECTIVE     Objective Measures updated at progress report unless specified.       TREATMENT     Abel received the treatments listed in bold below:      received therapeutic exercises to develop strength and ROM for 5 minutes including:  Pec stretch in doorway 3x 30 second holds  Chin tucks 20x 5 second holds  Cervical retraction 20x 5 second holds  Supine pec stretch over half foam x3 minutes  Horizontal ABD over half foam with RTB 2x 10  Scap retraction 20 x 5 sec hold  Passive Cervical rotation supine     received the following manual therapy techniques: Soft tissue Mobilization were applied to the: neck for 00 minutes, including:     received the " followingdynamic functional therapeutic activities to improve functional performance for 00 minutes, including:  Red putty (provided for home) x3 minutes  Digi , green single digits and combined digits x3 minutes  Hand , black 25 x30      received the following neuromuscular re-education activities to improve: Coordination and Posture for 00 minutes. The following activities were included:      Abel received the following supervised modalities after being cleared for contradictions: Mechanical Traction:  Abel received intermittent mechanical traction to the cervical spine at a force of 14 pounds for a total of 20 minutes including set up. Hold time of 40 seconds and rest time for 20 seconds. Patient tolerated treatment well without any adverse effects.      PATIENT EDUCATION AND HOME EXERCISES     Home Exercises Provided and Patient Education Provided     Education provided:   - Continuance of HEP  - Mechanical traction    Written Home Exercises Provided: Patient instructed to cont prior HEP. Exercises were reviewed and Abel was able to demonstrate them prior to the end of the session.  Abel demonstrated good  understanding of the education provided. See EMR under Patient Instructions for exercises provided during therapy sessions    ASSESSMENT   Limited treatment due to late arrival.  Continued with traction as patient notes improvement in symptoms after last session.  He states improved radicular symptoms post traction today with shoulder discomfort that is different from the prior pain.  Consider initiating ROTATOR CUFF strengthening in the future.      Abel Is progressing well towards his goals.   Pt prognosis is Fair.     Pt will continue to benefit from skilled outpatient physical therapy to address the deficits listed in the problem list box on initial evaluation, provide pt/family education and to maximize pt's level of independence in the home and community environment.     Pt's spiritual,  cultural and educational needs considered and pt agreeable to plan of care and goals.     Anticipated barriers to physical therapy: significant strength loss and fine motor skill deficits on left; significant imaging findings    Goals:      In 6 weeks,    Goal Status   Patient will be independent with HEP to promote improved therapy outcomes.  STG    In Progress   2. Patient will require min VC from PT for proper scapular retraction in order to improve postural awareness. STG    In Progress   3. Patient will improve shoulder MMT to 4/5 to improve performance of functional tasks. STG    In Progress   4. Patient will improve cervical ROM by 10 degrees to demonstrate improved functional mobility. STG    In Progress      In 12 weeks,    Goal Status   5. Patient will be independent with progression of HEP for self maintenance of symptoms.  LTG    In Progress   6. Patient will improve scapular MMT to 4/5 to improve performance of functional tasks. LTG    In Progress   7. 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 Patient will improve NDI from 38 to 30 to demonstrate improved functional mobility.  LTG    In Progress   9. Patient will report max pain of 4/10 to improve activity tolerance for work and household duties.  LTG    In Progress      PLAN   Plan of care Certification: 7/21/2023 to 10/13/2023.    Continue with established PT POC and progress per pt tolerance.      Outpatient Physical Therapy 2 times weekly for 24 visits to include the following interventions: Cervical/Lumbar Traction, Manual Therapy, Moist Heat/ Ice, Neuromuscular Re-ed, Patient Education, Therapeutic Activities, Therapeutic Exercise, and dry needling.   Pt will be seen by a physical therapist minimally every 6th visit or every 30 days.    Lucie Millard, PT

## 2023-08-11 NOTE — PROGRESS NOTES
PT/PTA met face to face to discuss pt's treatment plan and progress towards established goals. Pt will be seen by a physical therapist minimally every 6th visit or every 30 days.    Trini Morris III, PTA

## 2023-08-14 ENCOUNTER — CLINICAL SUPPORT (OUTPATIENT)
Dept: REHABILITATION | Facility: OTHER | Age: 58
End: 2023-08-14
Payer: COMMERCIAL

## 2023-08-14 DIAGNOSIS — M25.512 ACUTE PAIN OF LEFT SHOULDER: ICD-10-CM

## 2023-08-14 DIAGNOSIS — Z74.09 IMPAIRED FUNCTIONAL MOBILITY AND ACTIVITY TOLERANCE: ICD-10-CM

## 2023-08-14 DIAGNOSIS — R29.3 POOR POSTURE: ICD-10-CM

## 2023-08-14 DIAGNOSIS — M54.2 PAINFUL CERVICAL ROM: ICD-10-CM

## 2023-08-14 DIAGNOSIS — M54.2 NECK PAIN ON LEFT SIDE: Primary | ICD-10-CM

## 2023-08-14 PROCEDURE — 97012 MECHANICAL TRACTION THERAPY: CPT | Mod: PN

## 2023-08-16 NOTE — PROGRESS NOTES
"[facility]   Physical Therapy Treatment Note     Name: Abel Morris  Clinic Number: [id number]    Therapy Diagnosis:   Encounter Diagnoses   Name Primary?    Neck pain on left side Yes    Painful cervical ROM     Acute pain of left shoulder     Poor posture     Impaired functional mobility and activity tolerance        Physician: Felton White MD    Visit Date: 8/17/2023    Physician Orders: PT Eval and Treat   Medical Diagnosis from Referral: Cervical radiculopathy (M54.12); DDD (degenerative disc disease), cervical (M50.30)  Evaluation Date: 7/21/2023  Authorization Period Expiration: 12/31/2023  Plan of Care Certification Period: 7/21/2023 - 10/13/2023  Visit # / Visits authorized: 5 (4/ 12)       Progress Note Due: 8/21/2023   FOTO: 7/21/2023 (4/5; 1)    PTA Visit #: 1/5     Precautions: Standard, Diabetes, and HTN    Time In: 9:08 am   Time Out: 10:01 am  Total Billable Time: 50 minutes    SUBJECTIVE   Pt reports: Faint radicular symptoms this morning, but states the traction has been helping    He was not compliant with home exercise program.  Response to previous treatment: "Felt good"  Functional change: none    Pain: 0/10  Location: left neck down into 4-5th digits     OBJECTIVE     Objective Measures updated at progress report unless specified.       TREATMENT     Abel received the treatments listed in bold below:      received therapeutic exercises to develop strength and ROM for 30 minutes including:  Pec stretch in doorway 3x 30 second holds  Chin tucks 20x 5 second holds  Cervical retraction 20x 5 second holds  Supine pec stretch over half foam x3 minutes  Horizontal ABD with RTB 2 x 10    Scap retraction 20 x 5 sec hold  Passive Cervical rotation supine   +Cervical iso ext/SB RTB x 15  +Standing D2 flex RTB x 15    received the following manual therapy techniques: Soft tissue Mobilization were applied to the: neck for 00 minutes, including:     received the " followingdynamic functional therapeutic activities to improve functional performance for 00 minutes, including:  Red putty (provided for home) x3 minutes  Digi , green single digits and combined digits x3 minutes  Hand , black 25 x30      received the following neuromuscular re-education activities to improve: Coordination and Posture for 00 minutes. The following activities were included:      Abel received the following supervised modalities after being cleared for contradictions: Mechanical Traction:  Abel received intermittent mechanical traction to the cervical spine at a force of 14 pounds for a total of 20 minutes including set up. Hold time of 40 seconds and rest time for 20 seconds. Patient tolerated treatment well without any adverse effects.      PATIENT EDUCATION AND HOME EXERCISES     Home Exercises Provided and Patient Education Provided     Education provided:   - Cervical isometrics  - Exercise form and rationale  - Mechanical traction    Written Home Exercises Provided: Patient instructed to cont prior HEP. Exercises were reviewed and Abel was able to demonstrate them prior to the end of the session.  Abel demonstrated good  understanding of the education provided. See EMR under Patient Instructions for exercises provided during therapy sessions    ASSESSMENT   Continued with mechanical traction to improve radicular symptoms. Added cervical isometrics today with pt noting weakness in R UE when applying resistance with band. Consider initiating rotator cuff strengthening in the future.      Abel Is progressing well towards his goals.   Pt prognosis is Fair.     Pt will continue to benefit from skilled outpatient physical therapy to address the deficits listed in the problem list box on initial evaluation, provide pt/family education and to maximize pt's level of independence in the home and community environment.     Pt's spiritual, cultural and educational needs considered and pt  agreeable to plan of care and goals.     Anticipated barriers to physical therapy: significant strength loss and fine motor skill deficits on left; significant imaging findings    Goals:      In 6 weeks,    Goal Status   Patient will be independent with HEP to promote improved therapy outcomes.  STG    In Progress   2. Patient will require min VC from PT for proper scapular retraction in order to improve postural awareness. STG    In Progress   3. Patient will improve shoulder MMT to 4/5 to improve performance of functional tasks. STG    In Progress   4. Patient will improve cervical ROM by 10 degrees to demonstrate improved functional mobility. STG    In Progress      In 12 weeks,    Goal Status   5. Patient will be independent with progression of HEP for self maintenance of symptoms.  LTG    In Progress   6. Patient will improve scapular MMT to 4/5 to improve performance of functional tasks. LTG    In Progress   7. 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 Patient will improve NDI from 38 to 30 to demonstrate improved functional mobility.  LTG    In Progress   9. Patient will report max pain of 4/10 to improve activity tolerance for work and household duties.  LTG    In Progress      PLAN   Plan of care Certification: 7/21/2023 to 10/13/2023.    Continue with established PT POC and progress per pt tolerance.      Outpatient Physical Therapy 2 times weekly for 24 visits to include the following interventions: Cervical/Lumbar Traction, Manual Therapy, Moist Heat/ Ice, Neuromuscular Re-ed, Patient Education, Therapeutic Activities, Therapeutic Exercise, and dry needling.   Pt will be seen by a physical therapist minimally every 6th visit or every 30 days.    Trini Morris III, PTA

## 2023-08-17 ENCOUNTER — CLINICAL SUPPORT (OUTPATIENT)
Dept: REHABILITATION | Facility: OTHER | Age: 58
End: 2023-08-17
Payer: COMMERCIAL

## 2023-08-17 DIAGNOSIS — M54.2 PAINFUL CERVICAL ROM: ICD-10-CM

## 2023-08-17 DIAGNOSIS — R29.3 POOR POSTURE: ICD-10-CM

## 2023-08-17 DIAGNOSIS — M25.512 ACUTE PAIN OF LEFT SHOULDER: ICD-10-CM

## 2023-08-17 DIAGNOSIS — Z74.09 IMPAIRED FUNCTIONAL MOBILITY AND ACTIVITY TOLERANCE: ICD-10-CM

## 2023-08-17 DIAGNOSIS — M54.2 NECK PAIN ON LEFT SIDE: Primary | ICD-10-CM

## 2023-08-17 PROCEDURE — 97012 MECHANICAL TRACTION THERAPY: CPT | Mod: PN,CQ

## 2023-08-17 PROCEDURE — 97110 THERAPEUTIC EXERCISES: CPT | Mod: PN,CQ

## 2023-08-18 ENCOUNTER — PATIENT MESSAGE (OUTPATIENT)
Dept: PAIN MEDICINE | Facility: CLINIC | Age: 58
End: 2023-08-18
Payer: COMMERCIAL

## 2023-08-18 NOTE — PROGRESS NOTES
"[facility]   Physical Therapy Treatment Note     Name: Abel Morris  Clinic Number: [id number]    Therapy Diagnosis:   Encounter Diagnoses   Name Primary?    Neck pain on left side Yes    Painful cervical ROM     Acute pain of left shoulder     Poor posture     Impaired functional mobility and activity tolerance      Physician: Felton White MD    Visit Date: 8/21/2023    Physician Orders: PT Eval and Treat   Medical Diagnosis from Referral: Cervical radiculopathy (M54.12); DDD (degenerative disc disease), cervical (M50.30)  Evaluation Date: 7/21/2023  Authorization Period Expiration: 12/31/2023  Plan of Care Certification Period: 7/21/2023 - 10/13/2023  Visit # / Visits authorized: 6 (5/ 12)       Progress Note Due: 9/21/2023    FOTO: 8/21/2023 (1/5; 2)     PTA Visit #: 0/5     Precautions: Standard, Diabetes, and HTN    Time In: 7:46 am    Time Out: 8:33 am   Total Billable Time: 45 minutes    SUBJECTIVE   Pt reports: he is feeling pretty good today, minimal tingling and numbness in the arm so far this morning. Pain isn't as bad as it was before and he notices more flexibility.  He also endorses reduced tingling/numbness into the 4-5th digits and not as constant but does have some remaining difficulty with buttons and fine motor skills.   He sees the doctor 9/8 and is hoping to return to work 9/11.     He was not compliant with home exercise program.  Response to previous treatment: "Felt good"  Functional change: none    Pain: 0/10  Location: left neck down into 4-5th digits     OBJECTIVE     Objective Measures updated at progress report unless specified.   Range of Motion:      Cervical     Flexion  45    Extension  56   R Rotation  78   L Rotation  66   R Sidebending  30    L Sidebending  35         Shoulder Left AROM Right AROM   Flexion 170 165   Abduction 160  slight discomfort in shoulder 150   Internal Rotation T10 with discomfort  T9   External Rotation T1 " tightness in shoulder T1   Elbow WFL WFL   Wrist WFL WFL      ! = pain     Strength/MMT:      Shoulder Left Right   Flexion 5/5  4/5   Abduction 4+/5  4/5   Extension 5/5 4+/5   Internal Rotation 5/5 4+/5   External Rotation 5/5 4/5   Elbow       Flexion 5/5 5/5   Extension 5/5 5/5   Wrist       Flexion 5/5 5/5   Extension 5/5 5/5   Ulnar Deviation 5/5 5/5   Radial Deviation 5/5 5/5       : position 3; left; 85, 80, 70 lbs ; right: 80, 85, 80 lbs    DASH: 18.0     Intake Outcome Measure for FOTO Neck Survey    Therapist reviewed FOTO scores for Abel Morris on 8/21/2023.   FOTO documents entered into [employer] - see Media section.    8/21/2023 Score: 63%          TREATMENT     Abel received the treatments listed in bold below:      received therapeutic exercises to develop strength and ROM for 15 minutes including:  Pec stretch in doorway 3x 30 second holds  Chin tucks 20x 5 second holds  Cervical retraction 20x 5 second holds  Supine pec stretch over half foam x3 minutes  Horizontal ABD with GTB 2 x 10  + No money with GTB over towel roll 2x 10    Scap retraction 20 x 5 sec hold  Passive Cervical rotation supine   +Cervical iso ext/SB RTB x 15  +Standing D2 flex RTB x 15    received the following manual therapy techniques: Soft tissue Mobilization were applied to the: neck for 00 minutes, including:     received the followingdynamic functional therapeutic activities to improve functional performance for 30 minutes, including:  Re-assessment/FOTO  Red finger web flexion and extension x30  Red finger web individual pincer  x30    Red putty (provided for home) x3 minutes  Digi , green single digits and combined digits x3 minutes  Hand , black 25 x30      received the following neuromuscular re-education activities to improve: Coordination and Posture for 00 minutes. The following activities were included:      Abel received the following supervised modalities after being cleared for  contradictions: Mechanical Traction:  Abel received intermittent mechanical traction to the cervical spine at a force of 14 pounds for a total of 00 minutes including set up. Hold time of 40 seconds and rest time for 20 seconds. Patient tolerated treatment well without any adverse effects.      PATIENT EDUCATION AND HOME EXERCISES     Home Exercises Provided and Patient Education Provided     Education provided:   - progress with re-assessment  - making more visits    Written Home Exercises Provided: Patient instructed to cont prior HEP. Exercises were reviewed and Abel was able to demonstrate them prior to the end of the session.  Abel demonstrated good  understanding of the education provided. See EMR under Patient Instructions for exercises provided during therapy sessions    ASSESSMENT   At monthly re-assessment, patient demonstrates significant improvement in strength, cervical RANGE OF MOTION and activity tolerance.  He continues to endorse intermittent tingling and numbness into the 4-5th digits as well as fine motor skill limitations with buttons.  Plan to continue with exercises and emphasis on lifting and carrying so patient can return to work lifting 50+ boxes safely.      Abel Is progressing well towards his goals.   Pt prognosis is Fair.     Pt will continue to benefit from skilled outpatient physical therapy to address the deficits listed in the problem list box on initial evaluation, provide pt/family education and to maximize pt's level of independence in the home and community environment.     Pt's spiritual, cultural and educational needs considered and pt agreeable to plan of care and goals.     Anticipated barriers to physical therapy: significant strength loss and fine motor skill deficits on left; significant imaging findings    Goals:      In 6 weeks,    Goal Status   Patient will be independent with HEP to promote improved therapy outcomes.  STG    MET   2. Patient will require min VC  from PT for proper scapular retraction in order to improve postural awareness. STG    MET   3. Patient will improve shoulder MMT to 4/5 to improve performance of functional tasks. STG    MET   4. Patient will improve cervical ROM by 10 degrees to demonstrate improved functional mobility. STG   MET      In 12 weeks,    Goal Status   5. Patient will be independent with progression of HEP for self maintenance of symptoms.  LTG    In Progress   6. Patient will improve scapular MMT to 4/5 to improve performance of functional tasks. LTG    In Progress   7. 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 Patient will improve NDI from 38 to 30 to demonstrate improved functional mobility.  LTG    In Progress   9. Patient will report max pain of 4/10 to improve activity tolerance for work and household duties.  LTG    In Progress      PLAN   Plan of care Certification: 7/21/2023 to 10/13/2023.    Continue with established PT POC and progress per pt tolerance.      Outpatient Physical Therapy 2 times weekly for 24 visits to include the following interventions: Cervical/Lumbar Traction, Manual Therapy, Moist Heat/ Ice, Neuromuscular Re-ed, Patient Education, Therapeutic Activities, Therapeutic Exercise, and dry needling.   Pt will be seen by a physical therapist minimally every 6th visit or every 30 days.    Lucie Millard, PT

## 2023-08-21 ENCOUNTER — CLINICAL SUPPORT (OUTPATIENT)
Dept: REHABILITATION | Facility: OTHER | Age: 58
End: 2023-08-21
Payer: COMMERCIAL

## 2023-08-21 DIAGNOSIS — R29.3 POOR POSTURE: ICD-10-CM

## 2023-08-21 DIAGNOSIS — Z74.09 IMPAIRED FUNCTIONAL MOBILITY AND ACTIVITY TOLERANCE: ICD-10-CM

## 2023-08-21 DIAGNOSIS — M25.512 ACUTE PAIN OF LEFT SHOULDER: ICD-10-CM

## 2023-08-21 DIAGNOSIS — M54.2 NECK PAIN ON LEFT SIDE: Primary | ICD-10-CM

## 2023-08-21 DIAGNOSIS — M54.2 PAINFUL CERVICAL ROM: ICD-10-CM

## 2023-08-21 PROCEDURE — 97110 THERAPEUTIC EXERCISES: CPT | Mod: PN

## 2023-08-21 PROCEDURE — 97530 THERAPEUTIC ACTIVITIES: CPT | Mod: PN

## 2023-08-23 NOTE — PROGRESS NOTES
"[facility]   Physical Therapy Treatment Note     Name: Abel Morris  Clinic Number: [id number]    Therapy Diagnosis:   Encounter Diagnoses   Name Primary?    Neck pain on left side Yes    Painful cervical ROM     Acute pain of left shoulder     Poor posture     Impaired functional mobility and activity tolerance        Physician: Felton White MD    Visit Date: 8/24/2023    Physician Orders: PT Eval and Treat   Medical Diagnosis from Referral: Cervical radiculopathy (M54.12); DDD (degenerative disc disease), cervical (M50.30)  Evaluation Date: 7/21/2023  Authorization Period Expiration: 12/31/2023  Plan of Care Certification Period: 7/21/2023 - 10/13/2023  Visit # / Visits authorized: 7 (6/ 12)       Progress Note Due: 9/21/2023    FOTO: 8/21/2023 (2/5; 2)     PTA Visit #: 0/5     Precautions: Standard, Diabetes, and HTN    Time In: 8:08 am    Time Out: 9:02 am   Total Billable Time: 54 minutes    SUBJECTIVE   Pt reports: he is feeling pretty good today, minimal tingling and numbness in the arm so far this morning. Pain isn't as bad as it was before and he notices more flexibility.  He also endorses reduced tingling/numbness into the 4-5th digits and not as constant but does have some remaining difficulty with buttons and fine motor skills.   He sees the doctor 9/8 and is hoping to return to work 9/11.     He was not compliant with home exercise program.  Response to previous treatment: "Felt good"  Functional change: none    Pain: 0/10  Location: left neck down into 4-5th digits     OBJECTIVE     Objective Measures updated at progress report unless specified.   Range of Motion:      Cervical     Flexion  45    Extension  56   R Rotation  78   L Rotation  66   R Sidebending  30    L Sidebending  35         Shoulder Left AROM Right AROM   Flexion 170 165   Abduction 160  slight discomfort in shoulder 150   Internal Rotation T10 with discomfort  T9   External Rotation T1 " "tightness in shoulder T1   Elbow WFL WFL   Wrist WFL WFL      ! = pain     Strength/MMT:      8/24/23    Shoulder Left Right   Flexion 5/5  5/5   Abduction 5/5  455   Extension 5/5 4+/5   Internal Rotation 5/5 5/5   External Rotation 5/5 5/5   Elbow       Flexion 5/5 5/5   Extension 5/5 5/5   Wrist       Flexion 5/5 5/5   Extension 5/5 5/5   Ulnar Deviation 5/5 5/5   Radial Deviation 5/5 5/5       : position 3; left; 85, 80, 70 lbs ; right: 80, 85, 80 lbs    DASH: 18.0     Intake Outcome Measure for FOTO Neck Survey    Therapist reviewed FOTO scores for Abel Morris on 8/21/2023.   FOTO documents entered into [employer] - see Media section.    8/21/2023 Score: 63%          TREATMENT     Abel received the treatments listed in bold below:      received therapeutic exercises to develop strength and ROM for 10 minutes including:  Pec stretch in doorway 3x 30 second holds      Horizontal ABD with GTB 2 x 10 x 5" hold  No money with GTB 2 x 10 x 5" hold    Scap retraction 20 x 5 sec hold  Passive Cervical rotation supine   Cervical iso ext/SB RTB x 15  Standing D2 flex RTB x 15    received the following manual therapy techniques: Soft tissue Mobilization were applied to the: neck for 00 minutes, including:     received the followingdynamic functional therapeutic activities to improve functional performance for 00 minutes, including:    Red finger web flexion and extension x30  Red finger web individual pincer  x30    Red putty (provided for home) x3 minutes  Digi , green single digits and combined digits x3 minutes  Hand , black 25 x30      received the following neuromuscular re-education activities to improve: Coordination and Posture for 44 minutes. The following activities were included:  +ulnar nerve glides 3 x 8 reps  Pec stretch on towel roll x 3'  +snow angels on towel roll x 20 reps    +seated thoracic openers x 20 reps  +seated floor taps x 20 reps  +seated shoulder rows 15 forward, 15 " reverse  +seated side bending with rotation x 20 reps each side    +standing Ys with red band 2 x 10 reps    Chin tucks 20 x 5 second holds  Cervical retraction 20x 5 second holds    Abel received the following supervised modalities after being cleared for contradictions: Mechanical Traction:  Abel received intermittent mechanical traction to the cervical spine at a force of 14 pounds for a total of 00 minutes including set up. Hold time of 40 seconds and rest time for 20 seconds. Patient tolerated treatment well without any adverse effects.      PATIENT EDUCATION AND HOME EXERCISES     Home Exercises Provided and Patient Education Provided     Education provided:   - progress with re-assessment  - making more visits    Written Home Exercises Provided: Patient instructed to cont prior HEP. Exercises were reviewed and Abel was able to demonstrate them prior to the end of the session.  Abel demonstrated good  understanding of the education provided. See EMR under Patient Instructions for exercises provided during therapy sessions    ASSESSMENT   Continues to progress with improved strength and B shoulder AROM. Arrived with radicular symptoms into R 4th and 5th fingers. Initiated ulnar nerve glide today.    Abel Is progressing well towards his goals.   Pt prognosis is Fair.     Pt will continue to benefit from skilled outpatient physical therapy to address the deficits listed in the problem list box on initial evaluation, provide pt/family education and to maximize pt's level of independence in the home and community environment.     Pt's spiritual, cultural and educational needs considered and pt agreeable to plan of care and goals.     Anticipated barriers to physical therapy: significant strength loss and fine motor skill deficits on left; significant imaging findings    Goals:      In 6 weeks,    Goal Status   Patient will be independent with HEP to promote improved therapy outcomes.  STG    MET   2. Patient  will require min VC from PT for proper scapular retraction in order to improve postural awareness. STG    MET   3. Patient will improve shoulder MMT to 4/5 to improve performance of functional tasks. STG    MET   4. Patient will improve cervical ROM by 10 degrees to demonstrate improved functional mobility. STG   MET      In 12 weeks,    Goal Status   5. Patient will be independent with progression of HEP for self maintenance of symptoms.  LTG    In Progress   6. Patient will improve scapular MMT to 4/5 to improve performance of functional tasks. LTG    In Progress   7. 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 Patient will improve NDI from 38 to 30 to demonstrate improved functional mobility.  LTG    In Progress   9. Patient will report max pain of 4/10 to improve activity tolerance for work and household duties.  LTG    In Progress      PLAN   Plan of care Certification: 7/21/2023 to 10/13/2023.    Continue with established PT POC and progress per pt tolerance.      Outpatient Physical Therapy 2 times weekly for 24 visits to include the following interventions: Cervical/Lumbar Traction, Manual Therapy, Moist Heat/ Ice, Neuromuscular Re-ed, Patient Education, Therapeutic Activities, Therapeutic Exercise, and dry needling.   Pt will be seen by a physical therapist minimally every 6th visit or every 30 days.    Kike Cee, PT

## 2023-08-24 ENCOUNTER — CLINICAL SUPPORT (OUTPATIENT)
Dept: REHABILITATION | Facility: OTHER | Age: 58
End: 2023-08-24
Payer: COMMERCIAL

## 2023-08-24 DIAGNOSIS — Z74.09 IMPAIRED FUNCTIONAL MOBILITY AND ACTIVITY TOLERANCE: ICD-10-CM

## 2023-08-24 DIAGNOSIS — M25.512 ACUTE PAIN OF LEFT SHOULDER: ICD-10-CM

## 2023-08-24 DIAGNOSIS — M54.2 PAINFUL CERVICAL ROM: ICD-10-CM

## 2023-08-24 DIAGNOSIS — M54.2 NECK PAIN ON LEFT SIDE: Primary | ICD-10-CM

## 2023-08-24 DIAGNOSIS — R29.3 POOR POSTURE: ICD-10-CM

## 2023-08-24 PROCEDURE — 97110 THERAPEUTIC EXERCISES: CPT | Mod: PN

## 2023-08-24 PROCEDURE — 97112 NEUROMUSCULAR REEDUCATION: CPT | Mod: PN

## 2023-08-24 NOTE — PATIENT INSTRUCTIONS
Ulnar Nerve Glide        Starting position is the picture on the left.  Ending position is the picture on the right.    Do not hold the end position and do not push through any pain or excessive tingling/tension.    Perform 3 sets of 8 reps    Copyright © 2023 [employer]

## 2023-08-25 NOTE — PROGRESS NOTES
"[facility]   Physical Therapy Treatment Note     Name: Abel Morris  Clinic Number: [id number]    Therapy Diagnosis:   Encounter Diagnoses   Name Primary?    Neck pain on left side Yes    Painful cervical ROM     Acute pain of left shoulder     Poor posture     Impaired functional mobility and activity tolerance          Physician: Felotn White MD    Visit Date: 8/28/2023    Physician Orders: PT Eval and Treat   Medical Diagnosis from Referral: Cervical radiculopathy (M54.12); DDD (degenerative disc disease), cervical (M50.30)  Evaluation Date: 7/21/2023  Authorization Period Expiration: 12/31/2023  Plan of Care Certification Period: 7/21/2023 - 10/13/2023  Visit # / Visits authorized: 8 (7/ 12)       Progress Note Due: 9/21/2023    FOTO: 8/21/2023 (3/5; 2)     PTA Visit #: 0/5     Precautions: Standard, Diabetes, and HTN    Time In: 7:48 am     Time Out: 8:30 am    Total Billable Time: 40 minutes    SUBJECTIVE   Pt reports: he is feeling okay today.  Notes some discomfort in the left neck area but not a lot, "just enough to let me know it is still there".      He was not compliant with home exercise program.  Response to previous treatment: "Felt good"  Functional change: none    Pain: 2/10  Location: left neck down into 4-5th digits     OBJECTIVE     Objective Measures updated at progress report unless specified.     TREATMENT     Abel received the treatments listed in bold below:      received therapeutic exercises to develop strength and ROM for 15 minutes including:  Horizontal ABD with GTB 2 x 10 x 5" hold  No money with GTB 2 x 10 x 5" hold  + Upper trap stretch 3x 30 seconds on left  + levator scap stretch 3x 30 seconds on left       Scap retraction 20 x 5 sec hold  Passive Cervical rotation supine   Cervical iso ext/SB RTB x 15  Standing D2 flex RTB x 15    received the following manual therapy techniques: Soft tissue Mobilization were applied to the: neck for 00 " minutes, including:     received the followingdynamic functional therapeutic activities to improve functional performance for 00 minutes, including:  Red finger web flexion and extension x30  Red finger web individual pincer  x30  Red putty (provided for home) x3 minutes  Digi , green single digits and combined digits x3 minutes  Hand , black 25 x30      received the following neuromuscular re-education activities to improve: Coordination and Posture for 25 minutes. The following activities were included:  ulnar nerve glides 3 x 8 reps  Pec stretch on towel roll x 3'  snow angels on towel roll x 20 reps    seated thoracic openers x 20 reps  seated floor taps x 20 reps  seated shoulder rows 15 forward, 15 reverse  seated side bending with rotation x 20 reps each side  + Seated thoracic extension 2x 10 with 5 second holds    standing Ys with red band 2 x 10 reps    Chin tucks 20 x 5 second holds  Cervical retraction 20x 5 second holds        Abel received the following supervised modalities after being cleared for contradictions: Mechanical Traction:  Abel received intermittent mechanical traction to the cervical spine at a force of 14 pounds for a total of 00 minutes including set up. Hold time of 40 seconds and rest time for 20 seconds. Patient tolerated treatment well without any adverse effects.      PATIENT EDUCATION AND HOME EXERCISES     Home Exercises Provided and Patient Education Provided     Education provided:   - progress with re-assessment  - making more visits    Written Home Exercises Provided: Patient instructed to cont prior HEP. Exercises were reviewed and Abel was able to demonstrate them prior to the end of the session.  Able demonstrated good  understanding of the education provided. See EMR under Patient Instructions for exercises provided during therapy sessions    ASSESSMENT   Patient noted reduced symptoms in the fingers/hand although endorses some feelings of tightness in the  posterior shoulder area. Continue with activities as tolerated to improve shoulder girdle strengthening.       Abel Is progressing well towards his goals.   Pt prognosis is Fair.     Pt will continue to benefit from skilled outpatient physical therapy to address the deficits listed in the problem list box on initial evaluation, provide pt/family education and to maximize pt's level of independence in the home and community environment.     Pt's spiritual, cultural and educational needs considered and pt agreeable to plan of care and goals.     Anticipated barriers to physical therapy: significant strength loss and fine motor skill deficits on left; significant imaging findings    Goals:      In 6 weeks,    Goal Status   Patient will be independent with HEP to promote improved therapy outcomes.  STG    MET   2. Patient will require min VC from PT for proper scapular retraction in order to improve postural awareness. STG    MET   3. Patient will improve shoulder MMT to 4/5 to improve performance of functional tasks. STG    MET   4. Patient will improve cervical ROM by 10 degrees to demonstrate improved functional mobility. STG   MET      In 12 weeks,    Goal Status   5. Patient will be independent with progression of HEP for self maintenance of symptoms.  LTG    In Progress   6. Patient will improve scapular MMT to 4/5 to improve performance of functional tasks. LTG    In Progress   7. 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 Patient will improve NDI from 38 to 30 to demonstrate improved functional mobility.  LTG    In Progress   9. Patient will report max pain of 4/10 to improve activity tolerance for work and household duties.  LTG    In Progress      PLAN   Plan of care Certification: 7/21/2023 to 10/13/2023.    Continue with established PT POC and progress per pt tolerance.      Outpatient Physical Therapy 2 times weekly for 24 visits to include  the following interventions: Cervical/Lumbar Traction, Manual Therapy, Moist Heat/ Ice, Neuromuscular Re-ed, Patient Education, Therapeutic Activities, Therapeutic Exercise, and dry needling.   Pt will be seen by a physical therapist minimally every 6th visit or every 30 days.    Lucie Millard, PT

## 2023-08-28 ENCOUNTER — CLINICAL SUPPORT (OUTPATIENT)
Dept: REHABILITATION | Facility: OTHER | Age: 58
End: 2023-08-28
Payer: COMMERCIAL

## 2023-08-28 DIAGNOSIS — R29.3 POOR POSTURE: ICD-10-CM

## 2023-08-28 DIAGNOSIS — M54.2 NECK PAIN ON LEFT SIDE: Primary | ICD-10-CM

## 2023-08-28 DIAGNOSIS — Z74.09 IMPAIRED FUNCTIONAL MOBILITY AND ACTIVITY TOLERANCE: ICD-10-CM

## 2023-08-28 DIAGNOSIS — M25.512 ACUTE PAIN OF LEFT SHOULDER: ICD-10-CM

## 2023-08-28 DIAGNOSIS — M54.2 PAINFUL CERVICAL ROM: ICD-10-CM

## 2023-08-28 PROCEDURE — 97110 THERAPEUTIC EXERCISES: CPT | Mod: PN

## 2023-08-28 PROCEDURE — 97112 NEUROMUSCULAR REEDUCATION: CPT | Mod: PN

## 2023-08-30 ENCOUNTER — OFFICE VISIT (OUTPATIENT)
Dept: PAIN MEDICINE | Facility: CLINIC | Age: 58
End: 2023-08-30
Payer: COMMERCIAL

## 2023-08-30 VITALS
HEART RATE: 96 BPM | OXYGEN SATURATION: 100 % | HEIGHT: 76 IN | TEMPERATURE: 98 F | BODY MASS INDEX: 29 KG/M2 | WEIGHT: 238.13 LBS | RESPIRATION RATE: 18 BRPM | DIASTOLIC BLOOD PRESSURE: 68 MMHG | SYSTOLIC BLOOD PRESSURE: 104 MMHG

## 2023-08-30 DIAGNOSIS — M54.12 CERVICAL RADICULOPATHY: Primary | ICD-10-CM

## 2023-08-30 DIAGNOSIS — M50.30 DDD (DEGENERATIVE DISC DISEASE), CERVICAL: ICD-10-CM

## 2023-08-30 DIAGNOSIS — M47.812 CERVICAL SPONDYLOSIS: ICD-10-CM

## 2023-08-30 PROCEDURE — 99213 OFFICE O/P EST LOW 20 MIN: CPT | Mod: S$GLB,,, | Performed by: ANESTHESIOLOGY

## 2023-08-30 PROCEDURE — 99999 PR PBB SHADOW E&M-EST. PATIENT-LVL IV: CPT | Mod: PBBFAC,COE,, | Performed by: ANESTHESIOLOGY

## 2023-08-30 PROCEDURE — 99213 PR OFFICE/OUTPT VISIT, EST, LEVL III, 20-29 MIN: ICD-10-PCS | Mod: S$GLB,,, | Performed by: ANESTHESIOLOGY

## 2023-08-30 PROCEDURE — 99999 PR PBB SHADOW E&M-EST. PATIENT-LVL IV: ICD-10-PCS | Mod: PBBFAC,COE,, | Performed by: ANESTHESIOLOGY

## 2023-08-30 NOTE — PROGRESS NOTES
"[facility]   Physical Therapy Treatment Note     Name: Abel Morris  Clinic Number: [id number]    Therapy Diagnosis:   Encounter Diagnoses   Name Primary?    Neck pain on left side Yes    Painful cervical ROM     Acute pain of left shoulder     Poor posture     Impaired functional mobility and activity tolerance        Physician: Felton White MD    Visit Date: 8/31/2023    Physician Orders: Physical Therapy Evaluation and Treat   Medical Diagnosis from Referral: Cervical radiculopathy (M54.12); DDD (degenerative disc disease), cervical (M50.30)  Evaluation Date: 7/21/2023  Authorization Period Expiration: 12/31/2023  Plan of Care Certification Period: 7/21/2023 - 10/13/2023  Visit # / Visits authorized: 8 (7/ 12)       Progress Note Due: 9/21/2023    Focus On Therapeutic Outcomes Tool: 8/21/2023 (4/5; 2)   PTA Visit #: 0/5     Precautions: Standard, Diabetes, and Hypertension    Time In: 0815     Time Out: 0901     Total Billable Time:  minutes    SUBJECTIVE   Patient reports: tingling into his left upper extremity has lessened    He was not compliant with home exercise program.  Response to previous treatment: no adverse effects  Functional change: tingling into left fourth and fifth fingers not as intense.    Pain: 1.5/10  Location: left neck down into fourth and fifth digits     OBJECTIVE     Objective Measures updated at progress report unless specified.     TREATMENT     Abel received the treatments listed in bold below:      received therapeutic exercises to develop strength and range of motion for 15 minutes including:  [x]Horizontal abduction with green band 2 x 10 x 5" hold  [x]No money with green band  2 x 10 x 5" hold  [x]Upper trapezius stretch 3x 30 seconds on left  [x] levator scapulae stretch 3x 30 seconds on left       [] Scapular retraction 20 x 5 sec hold  [] Passive Cervical rotation supine   [] Cervical isometric extension/side bending with red band  x " 15  [] Standing D2 flexion red band  x 15    received the following manual therapy techniques: Soft tissue Mobilization were applied to the: neck for 00 minutes, including:     received the followingdynamic functional therapeutic activities to improve functional performance for 00 minutes, including:  [] Red finger web flexion and extension x30  [] Red finger web individual pincer  x30  [] Red putty (provided for home) x3 minutes  [] Digi-, green single digits and combined digits x3 minutes  [] Hand , black 25 x30      received the following neuromuscular re-education activities to improve: Coordination and Posture for 25 minutes. The following activities were included:  [x] ulnar nerve glides 3 x 8 reps  [x] Pec stretch on towel roll x 3'  [x] snow angels on towel roll x 20 reps    [] seated thoracic openers x 20 reps  [x] seated floor taps x 20 reps  [x] seated shoulder rolls 15 forward, 15 reverse  [x] seated side bending with rotation x 20 reps each side  [x] Seated thoracic extension 2x 10 with 5 second holds    [x] standing Ys with red band 2 x 10 reps    [] Chin tucks 20 x 5 second holds  [] Cervical retraction 20x 5 second holds      Abel received the following supervised modalities after being cleared for contradictions: Mechanical Traction:  Abel received intermittent mechanical traction to the cervical spine at a force of 14 pounds for a total of 00 minutes including set up. Hold time of 40 seconds and rest time for 20 seconds. Patient tolerated treatment well without any adverse effects.      PATIENT EDUCATION AND HOME EXERCISES     Home Exercises Provided and Patient Education Provided     Education provided:       Written Home Exercises Provided: Patient instructed to cont prior home exercise program. Exercises were reviewed and Abel was able to demonstrate them prior to the end of the session.  Abel demonstrated good  understanding of the education provided. See Electronic Medical  Record under Patient Instructions for exercises provided during therapy sessions    ASSESSMENT   Continues to experience reduction of radicular symptoms into left upper extremity. Will continue with postural reeducation, periscapular strengthening to decrease pain and radicular symptoms and progress to goals.       Abel Is progressing well towards his goals.   Patient prognosis is Fair.     Patient will continue to benefit from skilled outpatient physical therapy to address the deficits listed in the problem list box on initial evaluation, provide patient/family education and to maximize pt's level of independence in the home and community environment.     Patient's spiritual, cultural and educational needs considered and patient agreeable to plan of care and goals.     Anticipated barriers to physical therapy: significant strength loss and fine motor skill deficits on left; significant imaging findings    Goals:      In 6 weeks,  Goal Status   Patient will be independent with home exercise program  to promote improved therapy outcomes.     MET   2. Patient will require min verbal cues from Physical Therapist for proper scapular retraction in order to improve postural awareness.    MET   3. Patient will improve shoulder manual muscle test  to 4/5 to improve performance of functional tasks.    MET   4. Patient will improve cervical range of motion  by 10 degrees to demonstrate improved functional mobility.   MET      In 12 weeks,  Goal Status   5. Patient will be independent with progression of home exercise program  for self maintenance of symptoms.     In Progress   6. Patient will improve scapular MMT to 4/5 to improve performance of functional tasks.    In Progress   7. 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     In Progress   8. Patient will improve Neck Disability Index from 38 to 30 to demonstrate improved functional mobility.     In Progress   9. Patient will  report maximum pain of 4/10 to improve activity tolerance for work and household duties.     In Progress      PLAN   Plan of care Certification: 7/21/2023 to 10/13/2023.    Continue with established PT POC and progress per pt tolerance.      Outpatient Physical Therapy 2 times weekly for 24 visits to include the following interventions: Cervical/Lumbar Traction, Manual Therapy, Moist Heat/ Ice, Neuromuscular Re-ed, Patient Education, Therapeutic Activities, Therapeutic Exercise, and dry needling.   Pt will be seen by a physical therapist minimally every 6th visit or every 30 days.    Kike Cee, PT

## 2023-08-31 ENCOUNTER — CLINICAL SUPPORT (OUTPATIENT)
Dept: REHABILITATION | Facility: OTHER | Age: 58
End: 2023-08-31
Payer: COMMERCIAL

## 2023-08-31 DIAGNOSIS — M54.2 PAINFUL CERVICAL ROM: ICD-10-CM

## 2023-08-31 DIAGNOSIS — M25.512 ACUTE PAIN OF LEFT SHOULDER: ICD-10-CM

## 2023-08-31 DIAGNOSIS — M54.2 NECK PAIN ON LEFT SIDE: Primary | ICD-10-CM

## 2023-08-31 DIAGNOSIS — Z74.09 IMPAIRED FUNCTIONAL MOBILITY AND ACTIVITY TOLERANCE: ICD-10-CM

## 2023-08-31 DIAGNOSIS — R29.3 POOR POSTURE: ICD-10-CM

## 2023-08-31 PROCEDURE — 97110 THERAPEUTIC EXERCISES: CPT | Mod: PN

## 2023-08-31 PROCEDURE — 97112 NEUROMUSCULAR REEDUCATION: CPT | Mod: PN

## 2023-09-01 NOTE — PROGRESS NOTES
"[facility]   Physical Therapy Treatment Note     Name: bAel Morris  Clinic Number: [id number]    Therapy Diagnosis:   Encounter Diagnoses   Name Primary?    Neck pain on left side Yes    Painful cervical ROM     Acute pain of left shoulder     Poor posture     Impaired functional mobility and activity tolerance        Physician: Felton White MD    Visit Date: 9/4/2023    Physician Orders: Physical Therapy Evaluation and Treat   Medical Diagnosis from Referral: Cervical radiculopathy (M54.12); DDD (degenerative disc disease), cervical (M50.30)  Evaluation Date: 7/21/2023  Authorization Period Expiration: 12/31/2023  Plan of Care Certification Period: 7/21/2023 - 10/13/2023  Visit # / Visits authorized: 10 (9/ 12)       Progress Note Due: 9/21/2023    Focus On Therapeutic Outcomes Tool: 9/4/2023 (1/5; 3)     PTA Visit #: 1/5     Precautions: Standard, Diabetes, and Hypertension    Time In: 7:56 am    Time Out: 8:45 am    Total Billable Time: 45 minutes    SUBJECTIVE   Patient reports: Isn't having any pain this morning    He was somewhat compliant with home exercise program.  Response to previous treatment: "Felt good"  Functional change: tingling into left fourth and fifth fingers not as intense.    Pain: 0/10  Location: left neck down into fourth and fifth digits     OBJECTIVE     Objective Measures updated at progress report unless specified.     TREATMENT     Abel received the treatments listed below:      received therapeutic exercises to develop strength and range of motion for 15 minutes including:  [x]Horizontal abduction with green band 2 x 10 x 5" hold  [x]No money with green band  2 x 10 x 5" hold  [x]Upper trapezius stretch 3x 30 seconds on left  [x] levator scapulae stretch 3x 30 seconds on left       [] Scapular retraction 20 x 5 sec hold  [] Passive Cervical rotation supine   [] Cervical isometric extension/side bending with red band  x 15  [] Standing D2 " "flexion red band  x 15    received the following manual therapy techniques: Soft tissue Mobilization were applied to the: neck for 00 minutes, including:     received the followingdynamic functional therapeutic activities to improve functional performance for 20 minutes, including:  [] Red finger web flexion and extension x 30  [] Red finger web individual pincer  x 30  [] Red putty (provided for home) x 3 minutes  [] Digi- (green), single digits and combined digits x 3 minutes  [] Hand , black 25 x30  [x] +Lift Station: lifting 10# to 72" height x 10 ea hand  [x] +Lift Station: Lifting 20# box to 72" height x 10  [x] +Overhead carry on L 10# KB 50ft x 2  [x] +Overhead purse carry on L 10# KB 50ft x 2  [x] +Overhead purse carry 15# KB 50ft x 2 ea    received the following neuromuscular re-education activities to improve: Coordination and Posture for 10 minutes. The following activities were included:  [x] Ulnar nerve glides 3 x 8 reps  [x] Pec stretch on towel roll x 3'  [x] Snow angels on towel roll x 20 reps    [] Seated thoracic openers x 20 reps  [] Seated floor taps x 20 reps  [] Seated shoulder rolls 15 forward, 15 reverse  [] Seated side bending with rotation x 20 reps each side  [] Seated thoracic extension 2x 10 with 5 second holds    [x] Standing Y's with green band 2 x 10 reps    [] Chin tucks 20 x 5 second holds  [] Cervical retraction 20x 5 second holds      Abel received the following supervised modalities after being cleared for contradictions: Mechanical Traction:  Abel received intermittent mechanical traction to the cervical spine at a force of 14 pounds for a total of 00 minutes including set up. Hold time of 40 seconds and rest time for 20 seconds. Patient tolerated treatment well without any adverse effects.      PATIENT EDUCATION AND HOME EXERCISES     Home Exercises Provided and Patient Education Provided     Education provided:   - Continuance of HEP    Written Home Exercises " Provided: Patient instructed to cont prior home exercise program. Exercises were reviewed and Abel was able to demonstrate them prior to the end of the session.  Abel demonstrated good  understanding of the education provided. See Electronic Medical Record under Patient Instructions for exercises provided during therapy sessions    ASSESSMENT   Introduced job specific functional tasks to today's treatment with no adverse effects. Pt noted feeling tingling in hand, but did not increase or decrease throughout session. Will continue with periscapular strengthening and postural reeducation.    Abel Is progressing well towards his goals.   Patient prognosis is Fair.     Patient will continue to benefit from skilled outpatient physical therapy to address the deficits listed in the problem list box on initial evaluation, provide patient/family education and to maximize pt's level of independence in the home and community environment.     Patient's spiritual, cultural and educational needs considered and patient agreeable to plan of care and goals.     Anticipated barriers to physical therapy: significant strength loss and fine motor skill deficits on left; significant imaging findings    Goals:      In 6 weeks,  Goal Status   Patient will be independent with home exercise program  to promote improved therapy outcomes.     MET   2. Patient will require min verbal cues from Physical Therapist for proper scapular retraction in order to improve postural awareness.    MET   3. Patient will improve shoulder manual muscle test  to 4/5 to improve performance of functional tasks.    MET   4. Patient will improve cervical range of motion  by 10 degrees to demonstrate improved functional mobility.   MET      In 12 weeks,  Goal Status   5. Patient will be independent with progression of home exercise program  for self maintenance of symptoms.     In Progress   6. Patient will improve scapular MMT to 4/5 to improve performance of  functional tasks.    In Progress   7. 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     In Progress   8. Patient will improve Neck Disability Index from 38 to 30 to demonstrate improved functional mobility.     In Progress   9. Patient will report maximum pain of 4/10 to improve activity tolerance for work and household duties.     In Progress      PLAN   Plan of care Certification: 7/21/2023 to 10/13/2023.    Continue with established PT POC and progress per pt tolerance.      Outpatient Physical Therapy 2 times weekly for 24 visits to include the following interventions: Cervical/Lumbar Traction, Manual Therapy, Moist Heat/ Ice, Neuromuscular Re-ed, Patient Education, Therapeutic Activities, Therapeutic Exercise, and dry needling.   Pt will be seen by a physical therapist minimally every 6th visit or every 30 days.    Trini Morris III, PTA

## 2023-09-04 ENCOUNTER — CLINICAL SUPPORT (OUTPATIENT)
Dept: REHABILITATION | Facility: OTHER | Age: 58
End: 2023-09-04
Payer: COMMERCIAL

## 2023-09-04 DIAGNOSIS — M54.2 NECK PAIN ON LEFT SIDE: Primary | ICD-10-CM

## 2023-09-04 DIAGNOSIS — M25.512 ACUTE PAIN OF LEFT SHOULDER: ICD-10-CM

## 2023-09-04 DIAGNOSIS — M54.2 PAINFUL CERVICAL ROM: ICD-10-CM

## 2023-09-04 DIAGNOSIS — R29.3 POOR POSTURE: ICD-10-CM

## 2023-09-04 DIAGNOSIS — Z74.09 IMPAIRED FUNCTIONAL MOBILITY AND ACTIVITY TOLERANCE: ICD-10-CM

## 2023-09-04 PROCEDURE — 97112 NEUROMUSCULAR REEDUCATION: CPT | Mod: PN,CQ

## 2023-09-04 PROCEDURE — 97530 THERAPEUTIC ACTIVITIES: CPT | Mod: PN,CQ

## 2023-09-04 PROCEDURE — 97110 THERAPEUTIC EXERCISES: CPT | Mod: PN,CQ

## 2023-09-16 NOTE — PROGRESS NOTES
"[facility]   Physical Therapy Treatment Note     Name: Abel Morris  Clinic Number: [id number]    Therapy Diagnosis:   Encounter Diagnoses   Name Primary?    Neck pain on left side Yes    Painful cervical ROM     Acute pain of left shoulder     Poor posture     Impaired functional mobility and activity tolerance          Physician: Felton White MD    Visit Date: 9/18/2023    Physician Orders: Physical Therapy Evaluation and Treat   Medical Diagnosis from Referral: Cervical radiculopathy (M54.12); DDD (degenerative disc disease), cervical (M50.30)  Evaluation Date: 7/21/2023  Authorization Period Expiration: 12/31/2023  Plan of Care Certification Period: 7/21/2023 - 10/13/2023  Visit # / Visits authorized: 11 (10/ 12)       Progress Note Due: 10/18/2023  Focus On Therapeutic Outcomes Tool: 9/4/2023 d/c    PTA Visit #: 0/5     Precautions: Standard, Diabetes, and Hypertension    Time In: 8:32 am    Time Out: 9:26 am     Total Billable Time: 54 minutes    SUBJECTIVE   Patient reports: it is his second week back at work and he hasn't felt anything.  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  Continues to have tingling and numbness in the 4th and 5th digits but this improves with the "ok" exercise.  He also continues to have difficulty with gross fine motor skills, not related to the 4th and 5th digits including doing buttons on his shirt and tying a tie.       He was somewhat compliant with home exercise program.  Response to previous treatment: "Felt good"  Functional change: tingling into left fourth and fifth fingers not as intense.    Pain: 0/10  Location: left neck down into fourth and fifth digits     OBJECTIVE     Objective Measures updated at progress report unless specified.   Strength/MMT:      Shoulder Left Right   Flexion 5/5   5/5   Abduction 5/5   5/5   Extension 5/5 5/5   Internal Rotation 5/5 5/5   External Rotation 5/5 5/5   Elbow       Flexion 5/5 5/5 " "  Extension 5/5 5/5   Wrist       Flexion 5/5 5/5   Extension 5/5 5/5   Ulnar Deviation 5/5 5/5   Radial Deviation 5/5 5/5      Mid traps: left: 4+/5; right: 4+/5  Rhomboids: left: 4+/5; right: 4+/5  Lower traps: left: 4/5; right: 4/5     : position 3; Left: 90, 90, 90 lbs ; Right: 90, 80, 85    TREATMENT     Abel received the treatments listed below:      received therapeutic exercises to develop strength and range of motion for 00 minutes including:  []Horizontal abduction with green band 2 x 10 x 5" hold  []No money with green band  2 x 10 x 5" hold  []Upper trapezius stretch 3x 30 seconds on left  [] levator scapulae stretch 3x 30 seconds on left       [] Scapular retraction 20 x 5 sec hold  [] Passive Cervical rotation supine   [] Cervical isometric extension/side bending with red band  x 15  [] Standing D2 flexion red band  x 15    received the following manual therapy techniques: Soft tissue Mobilization were applied to the: neck for 00 minutes, including:     received the followingdynamic functional therapeutic activities to improve functional performance for 30 minutes, including:  [x] Re-assessment  [x] Red ball lumbrical squeeze x30  [x] Red ball composite squeeze x30  [x] Red ball with yellow band finger extension x30  [x] Individual finger flexion/extension with red ball and yellow band x30   [] UPPER BODY ERGONOMETER level 1 forward x6 minutes for endurance and blood flow  [] Red finger web flexion and extension x 30  [] Red finger web individual pincer  x 30  [] Red putty (provided for home) x 3 minutes  [] Digi- (green), single digits and combined digits x 3 minutes  [] Hand , black 25 x30  [] +Lift Station: lifting 10# to 72" height x 10 ea hand  [] +Lift Station: Lifting 20# box to 72" height x 10  [] +Overhead carry on L 10# KB 50ft x 2  [] +Overhead purse carry on L 10# KB 50ft x 2  [] +Overhead purse carry 15# KB 50ft x 2 ea    received the following neuromuscular re-education " activities to improve: Coordination and Posture for  24 minutes. The following activities were included:  [x] Ulnar nerve glides x20 reps  [x] Velcro board pincher  rotation x10  [x] Velcro board Finger extension/extension with small stick x10   [] Pec stretch on towel roll x 3'  [] Snow angels on towel roll x 20 reps    [] Seated thoracic openers x 20 reps  [] Seated floor taps x 20 reps  [] Seated shoulder rolls 15 forward, 15 reverse  [] Seated side bending with rotation x 20 reps each side  [] Seated thoracic extension 2x 10 with 5 second holds    [] Standing Y's with green band 2 x 10 reps    [] Chin tucks 20 x 5 second holds  [] Cervical retraction 20x 5 second holds      PATIENT EDUCATION AND HOME EXERCISES     Home Exercises Provided and Patient Education Provided     Education provided:   - Continuance of HEP    Written Home Exercises Provided: Patient instructed to cont prior home exercise program. Exercises were reviewed and Abel was able to demonstrate them prior to the end of the session.  Abel demonstrated good  understanding of the education provided. See Electronic Medical Record under Patient Instructions for exercises provided during therapy sessions    ASSESSMENT   Patient has made excellent progress with therapy, returning to work without limitations.  His strength and overall cervical range of motion has improved since initial evaluation.  Ulnar nerve symptoms into 4th and 5th digits has improved but is still present as well as general fine motor skill deficits not related to neck and ulnar nerve such as buttoning a shirt.  Discussed potential referral to OT for continued bilateral hand/finger motor skills and coordination.    Abel Is progressing well towards his goals.   Patient prognosis is Fair.     Patient will continue to benefit from skilled outpatient physical therapy to address the deficits listed in the problem list box on initial evaluation, provide patient/family education and  to maximize pt's level of independence in the home and community environment.     Patient's spiritual, cultural and educational needs considered and patient agreeable to plan of care and goals.     Anticipated barriers to physical therapy: significant strength loss and fine motor skill deficits on left; significant imaging findings    Goals:      In 6 weeks,  Goal Status   Patient will be independent with home exercise program  to promote improved therapy outcomes.     MET   2. Patient will require min verbal cues from Physical Therapist for proper scapular retraction in order to improve postural awareness.    MET   3. Patient will improve shoulder manual muscle test  to 4/5 to improve performance of functional tasks.    MET   4. Patient will improve cervical range of motion  by 10 degrees to demonstrate improved functional mobility.   MET      In 12 weeks,  Goal Status   5. Patient will be independent with progression of home exercise program  for self maintenance of symptoms.     In Progress   6. Patient will improve scapular MMT to 4/5 to improve performance of functional tasks.    MET   7. 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      MET   8. Patient will improve Neck Disability Index from 38 to 30 to demonstrate improved functional mobility.     MET   9. Patient will report maximum pain of 4/10 to improve activity tolerance for work and household duties.    MET      PLAN   Plan for d/c at next visit.     Lucie Millard, PT

## 2023-09-18 ENCOUNTER — CLINICAL SUPPORT (OUTPATIENT)
Dept: REHABILITATION | Facility: OTHER | Age: 58
End: 2023-09-18
Payer: COMMERCIAL

## 2023-09-18 DIAGNOSIS — M25.512 ACUTE PAIN OF LEFT SHOULDER: ICD-10-CM

## 2023-09-18 DIAGNOSIS — R29.3 POOR POSTURE: ICD-10-CM

## 2023-09-18 DIAGNOSIS — M54.2 NECK PAIN ON LEFT SIDE: Primary | ICD-10-CM

## 2023-09-18 DIAGNOSIS — M54.2 PAINFUL CERVICAL ROM: ICD-10-CM

## 2023-09-18 DIAGNOSIS — Z74.09 IMPAIRED FUNCTIONAL MOBILITY AND ACTIVITY TOLERANCE: ICD-10-CM

## 2023-09-18 PROCEDURE — 97112 NEUROMUSCULAR REEDUCATION: CPT | Mod: PN

## 2023-09-18 PROCEDURE — 97530 THERAPEUTIC ACTIVITIES: CPT | Mod: PN

## 2023-10-18 ENCOUNTER — PATIENT OUTREACH (OUTPATIENT)
Dept: ADMINISTRATIVE | Facility: HOSPITAL | Age: 58
End: 2023-10-18
Payer: COMMERCIAL

## 2023-10-18 DIAGNOSIS — E11.40 TYPE 2 DIABETES MELLITUS WITH DIABETIC NEUROPATHY, WITHOUT LONG-TERM CURRENT USE OF INSULIN: Primary | ICD-10-CM

## 2023-10-26 NOTE — PATIENT INSTRUCTIONS
A1C goal: <7%  Fasting/premeal blood glucose goal:   2 hour post-meal blood glucose goal: less than 180      Continue metformin twice daily.   Restart glimepiride 4 mg ONCE daily with breakfast.   Should eat 3 meals/day and adopt regular day and night schedule. Avoid daytime naps.   Start Toujeo insulin at 20 units once daily.   See Diabetes Educator/Registered Dietician for Medical Nutrition Therapy and insulin training.   Test blood sugar 2x/day. - fasting and also before dinner/bedtime.   Return to clinic in 4 weeks.     Avoid drinking more than 2 beers/day.    Postpartum Progress Note    Assessment/Plan   28 y.o., G1 now  POD#0 s/p urgent  delivery under general anesthesia @ 28.2 wga for non-reassuring fetal heart tones in the setting of newly-diagnosed pre-eclampsia with severe features and suspected severe fetal growth restriction. Pt on labor and delivery for mag gtt ppx in setting of sPEC diagnosed by severe-range BPs and lab abnormalities, with lab abnormalities concerning for evolving HELLP syndrome.    sPEC, c/f HELLP syndrome  - dx'd based on severe-range BPs requiring IV antihypertensives  - s/p labetalol 20/40/80, hydral 10/10 (10/26 AM)  - Nifedipine 90 mg daily, BPs currently normotensive  - HELLP labs notable for:   - ALT/AST: 124/192 -> 145/302 -> 120/279 -> 109/209   - Cr 1.15 -> 1.10 -> 1.03 -> 1.00   - Plt 205 -> 109 -> 118 -> 91   - LDH on admission 555  - Mild headache, otherwise asymptomatic  - Repeat HELLP labs ordered for 2200  - UOP intermittently low, dark urine. Will continue to monitor  - Continue mag gtt at 1g/hr    Postpartum care  - Pain well controlled with PO medications  - Continue to advance diet as tolerated  - ppBC: undecided, considering POPs  - Formula feeding    Discussed with Dr. Ishaan Capellan MD  Labor and Delivery    Principal Problem:    Elevated blood pressure affecting pregnancy in third trimester, antepartum    Pregnancy Problems (from 10/26/23 to present)       Problem Noted Resolved    Elevated blood pressure affecting pregnancy in third trimester, antepartum 10/26/2023 by Patricia Capellan MD No    Priority:  Medium            Subjective   Pain is currently well controlled with medications. Reports mild headache, otherwise denies vision changes, chest pain, SOB. Intermittently nauseous. Bleeding light.     Objective   Allergies:   Aspirin         Last Vitals:  Temp Pulse Resp BP MAP Pulse Ox   36 °C (96.8 °F) 80 18 121/82   98 %     Vitals Min/Max Last 24 Hours:  Temp  Min: 36 °C (96.8 °F)  Max: 36.8 °C  (98.2 °F)  Pulse  Min: 67  Max: 114  Resp  Min: 16  Max: 18  BP  Min: 121/82  Max: 211/136    Intake/Output:     Intake/Output Summary (Last 24 hours) at 10/26/2023 1831  Last data filed at 10/26/2023 1730  Gross per 24 hour   Intake 2968.7 ml   Output 951 ml   Net 2017.7 ml         Physical Exam:  General: no acute distress  HEENT: normocephalic, atraumatic  Heart: warm and well perfused  Lungs: breathing comfortably on room air  Abdomen: soft, appropriately tender to palpation, pressure dressing in place, no rebound/guarding  : nixon in place draining dark yellow/brown urine  Extremities: moving all extremities  Neuro: awake and conversant  Psych: appropriate mood and affect    Lab Data:  Labs in chart have been reviewed

## 2023-12-24 NOTE — ASSESSMENT & PLAN NOTE
Markedly elevated A1c until recently, which may be responsible for the progressive atrophy and weakness he has been experiencing. The onset of weaknesses were many years ago and they have slowly progressed, which is not common with MND. EMG showed a profound sensorimotor polyneuropathy consistent with DM2. There was limited distribution of fibrillation waves, which can also be seen in poorly-controlled DM2. His clinic exam is not classic for MND/ALS at this time but close clinical surveillance is recommended.   - RTC in 4-6 months

## 2024-01-24 ENCOUNTER — PATIENT MESSAGE (OUTPATIENT)
Dept: ADMINISTRATIVE | Facility: HOSPITAL | Age: 59
End: 2024-01-24
Payer: COMMERCIAL

## 2024-02-21 ENCOUNTER — HOSPITAL ENCOUNTER (EMERGENCY)
Facility: HOSPITAL | Age: 59
Discharge: HOME | End: 2024-02-21
Attending: INTERNAL MEDICINE
Payer: COMMERCIAL

## 2024-02-21 VITALS
SYSTOLIC BLOOD PRESSURE: 130 MMHG | HEIGHT: 76 IN | OXYGEN SATURATION: 99 % | HEART RATE: 91 BPM | DIASTOLIC BLOOD PRESSURE: 82 MMHG | RESPIRATION RATE: 19 BRPM | WEIGHT: 234 LBS | TEMPERATURE: 98 F | BODY MASS INDEX: 28.49 KG/M2

## 2024-02-21 DIAGNOSIS — J06.9 VIRAL URI WITH COUGH: Primary | ICD-10-CM

## 2024-02-21 LAB
CTP QC/QA: YES
INFLUENZA A ANTIGEN, POC: NEGATIVE
INFLUENZA B ANTIGEN, POC: NEGATIVE
POCT GLUCOSE: 252 MG/DL (ref 70–110)
SARS-COV-2 RDRP RESP QL NAA+PROBE: NEGATIVE

## 2024-02-21 PROCEDURE — 87804 INFLUENZA ASSAY W/OPTIC: CPT | Mod: 59,ER

## 2024-02-21 PROCEDURE — 82962 GLUCOSE BLOOD TEST: CPT | Mod: ER

## 2024-02-21 PROCEDURE — 99283 EMERGENCY DEPT VISIT LOW MDM: CPT | Mod: ER

## 2024-02-21 PROCEDURE — 87635 SARS-COV-2 COVID-19 AMP PRB: CPT | Mod: ER | Performed by: INTERNAL MEDICINE

## 2024-02-21 RX ORDER — AZELASTINE 1 MG/ML
2 SPRAY, METERED NASAL 2 TIMES DAILY
Qty: 30 ML | Refills: 0 | Status: SHIPPED | OUTPATIENT
Start: 2024-02-21 | End: 2024-04-16

## 2024-02-21 RX ORDER — FLUTICASONE PROPIONATE 50 MCG
2 SPRAY, SUSPENSION (ML) NASAL DAILY
Qty: 15 G | Refills: 0 | Status: SHIPPED | OUTPATIENT
Start: 2024-02-21

## 2024-02-22 NOTE — ED NOTES
Pt aaox3. Resp even and unlabored on ra with no distress noted. Pt stated productive yellow/green cough x one week with sob on exertion.

## 2024-02-22 NOTE — ED PROVIDER NOTES
"Encounter Date: 2/21/2024       History     Chief Complaint   Patient presents with    URI     Pt reports "occasional SOB" and a cough for awhile. I feel like I have "a bad cold". Onset 1 week. Afebrile.     58-year-old male presents to the emergency department complaining of cough and nasal congestion x1 week.  Denies fever/chills/nausea/vomiting/chest pain/shortness breath.    The history is provided by the patient. No  was used.     Review of patient's allergies indicates:  No Known Allergies  Past Medical History:   Diagnosis Date    Arthritis     Diabetes mellitus type II, uncontrolled 11/28/2012    Diabetes mellitus with neurological manifestations, uncontrolled 6/13/2014    ED (erectile dysfunction) 11/28/2012    Eye injury     os hit broken orbital bone     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    History of colonic polyps 8/17/2018    Hyperlipidemia 11/28/2012    Poor compliance 1/8/2014    Primary hypertension 10/9/2021     Past Surgical History:   Procedure Laterality Date    COLONOSCOPY N/A 8/2/2016    Procedure: COLONOSCOPY;  Surgeon: Miguel Persaud MD;  Location: [facility] ENDO;  Service: Endoscopy;  Laterality: N/A;    COLONOSCOPY N/A 9/3/2019    Procedure: COLONOSCOPY;  Surgeon: Álvaro Carmichael MD;  Location: [facility] ENDO;  Service: Endoscopy;  Laterality: N/A;  confirmed appt- sp    EPIDURAL STEROID INJECTION N/A 5/16/2023    Procedure: INJECTION, STEROID, EPIDURAL, C7-T1 (LEFT SIDE) ECEN;  Surgeon: Allie Torres MD;  [facility];  Service: Pain Management;  Laterality: N/A;    LUMBAR PUNCTURE N/A 09/19/2019     Family History   Problem Relation Age of Onset    No Known Problems Mother     Colon polyps Father 61    No Known Problems Sister     No Known Problems Brother     No Known Problems Maternal Aunt     No Known Problems Maternal Uncle     No Known Problems Paternal Aunt     No Known Problems Paternal Uncle     Diabetes Maternal Grandmother     No Known Problems " Maternal Grandfather     No Known Problems Paternal Grandmother     No Known Problems Paternal Grandfather     Amblyopia Neg Hx     Blindness Neg Hx     Cancer Neg Hx     Cataracts Neg Hx     Glaucoma Neg Hx     Hypertension Neg Hx     Macular degeneration Neg Hx     Retinal detachment Neg Hx     Strabismus Neg Hx     Stroke Neg Hx     Thyroid disease Neg Hx      Social History     Tobacco Use    Smoking status: Former     Types: Cigars    Smokeless tobacco: Never   Substance Use Topics    Alcohol use: Yes     Alcohol/week: 6.0 standard drinks of alcohol     Types: 6 Cans of beer per week     Comment: per week    Drug use: No     Review of Systems   Constitutional:  Negative for chills and fever.   HENT:  Positive for congestion and postnasal drip.    Respiratory:  Positive for cough.    Cardiovascular:  Negative for chest pain.   Gastrointestinal:  Negative for diarrhea, nausea and vomiting.   All other systems reviewed and are negative.      Physical Exam     Initial Vitals [02/21/24 2012]   BP Pulse Resp Temp SpO2   139/89 95 18 98 °F (36.7 °C) 98 %      MAP       --         Physical Exam    Nursing note and vitals reviewed.  Constitutional: He appears well-developed and well-nourished.   HENT:   Head: Normocephalic and atraumatic.   Nose: Rhinorrhea present.   Mouth/Throat: Posterior oropharyngeal erythema present. No oropharyngeal exudate or posterior oropharyngeal edema.   Clear postnasal drip   Eyes: EOM are normal. Pupils are equal, round, and reactive to light.   Neck: Neck supple.   Normal range of motion.  Cardiovascular:  Normal rate and regular rhythm.           Pulmonary/Chest: Breath sounds normal. No respiratory distress. He has no wheezes. He has no rales.   Abdominal: Abdomen is soft. Bowel sounds are normal.   Musculoskeletal:         General: Normal range of motion.      Cervical back: Normal range of motion and neck supple.     Neurological: He is alert and oriented to person, place, and time.    Skin: Skin is warm and dry.   Psychiatric: He has a normal mood and affect.         ED Course   Procedures  Labs Reviewed   POCT GLUCOSE - Abnormal; Notable for the following components:       Result Value    POCT Glucose 252 (*)     All other components within normal limits   SARS-COV-2 RDRP GENE    Narrative:     This test utilizes isothermal nucleic acid amplification technology to detect the SARS-CoV-2 RdRp nucleic acid segment. The analytical sensitivity (limit of detection) is 500 copies/swab.     A POSITIVE result is indicative of the presence of SARS-CoV-2 RNA; clinical correlation with patient history and other diagnostic information is necessary to determine patient infection status.    A NEGATIVE result means that SARS-CoV-2 nucleic acids are not present above the limit of detection. A NEGATIVE result should be treated as presumptive. It does not rule out the possibility of COVID-19 and should not be the sole basis for treatment decisions. If COVID-19 is strongly suspected based on clinical and exposure history, re-testing using an alternate molecular assay should be considered.     Commercial kits are provided by [facility].   _________________________________________________________________   The authorized Fact Sheet for Healthcare Providers and the authorized Fact Sheet for Patients of the ID NOW COVID-19 are available on the FDA website:    https://www.fda.gov/media/179978/download      https://www.fda.gov/media/428620/download       POCT GLUCOSE MONITORING CONTINUOUS   POCT RAPID INFLUENZA A/B          Imaging Results    None          Medications - No data to display  Medical Decision Making  58-year-old male presents to the emergency department complaining of cough and nasal congestion x1 week.  Denies fever/chills/nausea/vomiting/chest pain/shortness breath.  Course of ED stay:   Rapid flu and COVID-19 screens were negative.  Patient was given instructions for viral URI as well as  prescriptions for Astelin/fluticasone.  He was advised to follow-up with his primary care physician within the next week for re-evaluation/return to the emergency department if condition worsens.    Amount and/or Complexity of Data Reviewed  Labs: ordered.    Risk  Prescription drug management.                                      Clinical Impression:  Final diagnoses:  [J06.9] Viral URI with cough (Primary)          ED Disposition Condition    Discharge Stable          ED Prescriptions       Medication Sig Dispense Start Date End Date Auth. Provider    azelastine (ASTELIN) 137 mcg (0.1 %) nasal spray 2 sprays (274 mcg total) by Nasal route 2 (two) times daily. 30 mL 2/21/2024 4/16/2024 Floyd Bailey MD    fluticasone propionate (FLONASE) 50 mcg/actuation nasal spray 2 sprays (100 mcg total) by Each Nostril route once daily. 15 g 2/21/2024 -- Floyd Bailey MD          Follow-up Information       Follow up With Specialties Details Why Contact Info    Sae Starkey MD Internal Medicine Schedule an appointment as soon as possible for a visit in 1 week For reevaluation [address] [facility]  [place] [id number]  [phone]               Floyd Bailey MD  02/22/24 [id number]

## 2024-03-08 ENCOUNTER — PATIENT OUTREACH (OUTPATIENT)
Dept: ADMINISTRATIVE | Facility: HOSPITAL | Age: 59
End: 2024-03-08
Payer: COMMERCIAL

## 2024-03-08 DIAGNOSIS — E11.40 TYPE 2 DIABETES MELLITUS WITH DIABETIC NEUROPATHY, WITHOUT LONG-TERM CURRENT USE OF INSULIN: Primary | ICD-10-CM

## 2024-06-05 ENCOUNTER — PATIENT OUTREACH (OUTPATIENT)
Dept: ADMINISTRATIVE | Facility: HOSPITAL | Age: 59
End: 2024-06-05
Payer: COMMERCIAL

## 2024-06-05 DIAGNOSIS — E78.5 HYPERLIPIDEMIA, UNSPECIFIED HYPERLIPIDEMIA TYPE: Primary | ICD-10-CM

## 2024-07-04 ENCOUNTER — PATIENT MESSAGE (OUTPATIENT)
Dept: FAMILY MEDICINE | Facility: CLINIC | Age: 59
End: 2024-07-04
Payer: COMMERCIAL

## 2024-07-10 ENCOUNTER — TELEPHONE (OUTPATIENT)
Dept: FAMILY MEDICINE | Facility: CLINIC | Age: 59
End: 2024-07-10
Payer: COMMERCIAL

## 2024-07-11 ENCOUNTER — OFFICE VISIT (OUTPATIENT)
Dept: FAMILY MEDICINE | Facility: CLINIC | Age: 59
End: 2024-07-11
Payer: COMMERCIAL

## 2024-07-11 ENCOUNTER — LAB VISIT (OUTPATIENT)
Dept: LAB | Facility: HOSPITAL | Age: 59
End: 2024-07-11
Attending: INTERNAL MEDICINE
Payer: COMMERCIAL

## 2024-07-11 VITALS
SYSTOLIC BLOOD PRESSURE: 118 MMHG | WEIGHT: 236.13 LBS | TEMPERATURE: 98 F | HEART RATE: 80 BPM | BODY MASS INDEX: 28.76 KG/M2 | DIASTOLIC BLOOD PRESSURE: 74 MMHG | OXYGEN SATURATION: 99 % | HEIGHT: 76 IN

## 2024-07-11 DIAGNOSIS — I87.2 VENOUS INSUFFICIENCY: ICD-10-CM

## 2024-07-11 DIAGNOSIS — Z00.00 ROUTINE MEDICAL EXAM: ICD-10-CM

## 2024-07-11 DIAGNOSIS — E11.65 UNCONTROLLED TYPE 2 DIABETES MELLITUS WITH HYPERGLYCEMIA: ICD-10-CM

## 2024-07-11 DIAGNOSIS — E78.5 HYPERLIPIDEMIA, UNSPECIFIED HYPERLIPIDEMIA TYPE: ICD-10-CM

## 2024-07-11 DIAGNOSIS — Z12.5 SCREENING FOR PROSTATE CANCER: ICD-10-CM

## 2024-07-11 DIAGNOSIS — Z79.4 LONG-TERM INSULIN USE: ICD-10-CM

## 2024-07-11 DIAGNOSIS — Z00.00 ROUTINE MEDICAL EXAM: Primary | ICD-10-CM

## 2024-07-11 DIAGNOSIS — E11.42 DIABETIC POLYNEUROPATHY ASSOCIATED WITH TYPE 2 DIABETES MELLITUS: ICD-10-CM

## 2024-07-11 DIAGNOSIS — I10 PRIMARY HYPERTENSION: ICD-10-CM

## 2024-07-11 DIAGNOSIS — Z86.010 HISTORY OF COLONIC POLYPS: ICD-10-CM

## 2024-07-11 DIAGNOSIS — Z86.711 HISTORY OF PULMONARY EMBOLUS (PE): ICD-10-CM

## 2024-07-11 DIAGNOSIS — Z91.199 POOR COMPLIANCE: ICD-10-CM

## 2024-07-11 LAB
ALBUMIN SERPL BCP-MCNC: 4 G/DL (ref 3.5–5.2)
ALP SERPL-CCNC: 49 U/L (ref 55–135)
ALT SERPL W/O P-5'-P-CCNC: 30 U/L (ref 10–44)
ANION GAP SERPL CALC-SCNC: 7 MMOL/L (ref 8–16)
AST SERPL-CCNC: 21 U/L (ref 10–40)
BASOPHILS # BLD AUTO: 0.02 K/UL (ref 0–0.2)
BASOPHILS NFR BLD: 0.4 % (ref 0–1.9)
BILIRUB SERPL-MCNC: 0.5 MG/DL (ref 0.1–1)
BUN SERPL-MCNC: 11 MG/DL (ref 6–20)
CALCIUM SERPL-MCNC: 9.9 MG/DL (ref 8.7–10.5)
CHLORIDE SERPL-SCNC: 102 MMOL/L (ref 95–110)
CHOLEST SERPL-MCNC: 202 MG/DL (ref 120–199)
CHOLEST/HDLC SERPL: 5.5 {RATIO} (ref 2–5)
CO2 SERPL-SCNC: 26 MMOL/L (ref 23–29)
COMPLEXED PSA SERPL-MCNC: 2.1 NG/ML (ref 0–4)
CREAT SERPL-MCNC: 1 MG/DL (ref 0.5–1.4)
DIFFERENTIAL METHOD BLD: NORMAL
EOSINOPHIL # BLD AUTO: 0.1 K/UL (ref 0–0.5)
EOSINOPHIL NFR BLD: 1.8 % (ref 0–8)
ERYTHROCYTE [DISTWIDTH] IN BLOOD BY AUTOMATED COUNT: 12.2 % (ref 11.5–14.5)
EST. GFR  (NO RACE VARIABLE): >60 ML/MIN/1.73 M^2
ESTIMATED AVG GLUCOSE: 192 MG/DL (ref 68–131)
GLUCOSE SERPL-MCNC: 196 MG/DL (ref 70–110)
HBA1C MFR BLD: 8.3 % (ref 4–5.6)
HCT VFR BLD AUTO: 42.6 % (ref 40–54)
HDLC SERPL-MCNC: 37 MG/DL (ref 40–75)
HDLC SERPL: 18.3 % (ref 20–50)
HGB BLD-MCNC: 14.7 G/DL (ref 14–18)
IMM GRANULOCYTES # BLD AUTO: 0.02 K/UL (ref 0–0.04)
IMM GRANULOCYTES NFR BLD AUTO: 0.4 % (ref 0–0.5)
LDLC SERPL CALC-MCNC: 131.8 MG/DL (ref 63–159)
LYMPHOCYTES # BLD AUTO: 1.7 K/UL (ref 1–4.8)
LYMPHOCYTES NFR BLD: 34.4 % (ref 18–48)
MCH RBC QN AUTO: 30.6 PG (ref 27–31)
MCHC RBC AUTO-ENTMCNC: 34.5 G/DL (ref 32–36)
MCV RBC AUTO: 89 FL (ref 82–98)
MONOCYTES # BLD AUTO: 0.6 K/UL (ref 0.3–1)
MONOCYTES NFR BLD: 10.9 % (ref 4–15)
NEUTROPHILS # BLD AUTO: 2.6 K/UL (ref 1.8–7.7)
NEUTROPHILS NFR BLD: 52.1 % (ref 38–73)
NONHDLC SERPL-MCNC: 165 MG/DL
NRBC BLD-RTO: 0 /100 WBC
PLATELET # BLD AUTO: 221 K/UL (ref 150–450)
PMV BLD AUTO: 10.1 FL (ref 9.2–12.9)
POTASSIUM SERPL-SCNC: 4.6 MMOL/L (ref 3.5–5.1)
PROT SERPL-MCNC: 6.9 G/DL (ref 6–8.4)
RBC # BLD AUTO: 4.8 M/UL (ref 4.6–6.2)
SODIUM SERPL-SCNC: 135 MMOL/L (ref 136–145)
TRIGL SERPL-MCNC: 166 MG/DL (ref 30–150)
TSH SERPL DL<=0.005 MIU/L-ACNC: 1.96 UIU/ML (ref 0.4–4)
WBC # BLD AUTO: 5.06 K/UL (ref 3.9–12.7)

## 2024-07-11 PROCEDURE — 80061 LIPID PANEL: CPT | Performed by: INTERNAL MEDICINE

## 2024-07-11 PROCEDURE — 3074F SYST BP LT 130 MM HG: CPT | Mod: CPTII,S$GLB,COE, | Performed by: INTERNAL MEDICINE

## 2024-07-11 PROCEDURE — 85025 COMPLETE CBC W/AUTO DIFF WBC: CPT | Performed by: INTERNAL MEDICINE

## 2024-07-11 PROCEDURE — 3078F DIAST BP <80 MM HG: CPT | Mod: CPTII,S$GLB,COE, | Performed by: INTERNAL MEDICINE

## 2024-07-11 PROCEDURE — 99999 PR PBB SHADOW E&M-EST. PATIENT-LVL IV: CPT | Mod: PBBFAC,COE,, | Performed by: INTERNAL MEDICINE

## 2024-07-11 PROCEDURE — 1159F MED LIST DOCD IN RCRD: CPT | Mod: CPTII,S$GLB,COE, | Performed by: INTERNAL MEDICINE

## 2024-07-11 PROCEDURE — 84443 ASSAY THYROID STIM HORMONE: CPT | Performed by: INTERNAL MEDICINE

## 2024-07-11 PROCEDURE — 36415 COLL VENOUS BLD VENIPUNCTURE: CPT | Mod: PO | Performed by: INTERNAL MEDICINE

## 2024-07-11 PROCEDURE — 3008F BODY MASS INDEX DOCD: CPT | Mod: CPTII,S$GLB,COE, | Performed by: INTERNAL MEDICINE

## 2024-07-11 PROCEDURE — 80053 COMPREHEN METABOLIC PANEL: CPT | Performed by: INTERNAL MEDICINE

## 2024-07-11 PROCEDURE — 83036 HEMOGLOBIN GLYCOSYLATED A1C: CPT | Performed by: INTERNAL MEDICINE

## 2024-07-11 PROCEDURE — 84153 ASSAY OF PSA TOTAL: CPT | Performed by: INTERNAL MEDICINE

## 2024-07-11 PROCEDURE — 99396 PREV VISIT EST AGE 40-64: CPT | Mod: S$GLB,COE,, | Performed by: INTERNAL MEDICINE

## 2024-07-11 RX ORDER — METFORMIN HYDROCHLORIDE 1000 MG/1
TABLET ORAL
Qty: 180 TABLET | Refills: 0 | Status: SHIPPED | OUTPATIENT
Start: 2024-07-11

## 2024-07-11 RX ORDER — ROSUVASTATIN CALCIUM 10 MG/1
10 TABLET, COATED ORAL DAILY
Qty: 90 TABLET | Refills: 3 | Status: SHIPPED | OUTPATIENT
Start: 2024-07-11 | End: 2025-07-11

## 2024-07-11 RX ORDER — GLIMEPIRIDE 2 MG/1
2 TABLET ORAL
Qty: 90 TABLET | Refills: 2 | Status: SHIPPED | OUTPATIENT
Start: 2024-07-11 | End: 2025-07-11

## 2024-07-11 NOTE — PROGRESS NOTES
Chief complaint:  Exam    58-year-old black male . Last seen 2016 then 8/18, 8/19 then 3/21, 10/21, 4/23 .  PSA 2018, 4/21 . MULTIPLE and large polyps 8/16 -ok 9/19 -5 yrs   . .  Here with his wife.  Last A1c was 7.6 about a year ago.  He is only on metformin.  He ran out of Amaryl.  He did Trulicity in the past without problems but it has been a long time.  Discussed follow-up every three months so when his 90 day supply of metformin is getting low, he should call for labs and an appointment.    Last PSA 2021    He was on Pravachol but just out for no particular reason.  Will start him on Crestor 10 mg which will be more potent.  Discussed the benefits of statin therapy    Patient last seen 4/23 in preop consultation today from Neurosurgery Dr. Good.  Apparently has cervical disc disease causing weakness and atrophy of the intrinsic muscles in both hands.  He definitely has weakness in the left arm although he did not notice it until he was tested by Neurosurgery.  He does have radicular symptoms down the left arm as well.  Neurosurgery notes reviewed.  Apparently saw pain management had an injection and physical therapy and the nerve impingement improved so apparently this was all cervical spondylosis and radiculopathy not any other neurological problem.  He does still have intrinsic wasting and intrinsic weakness but not getting any thing progressively worse.    Pain mgmt  DISCUSSION: Mr. Morris comes to me as part of [facility]. He was previously worked up by Dr. Mary for cervical radiculopathy. MRI shows a C6/7 foraminal protrusion with possible impingement on the left C7 nerve root. He reports not being able to carry heavy things with that arm, but there is no obvious unilateral weakness on exam. He does have interosseous weakness bilaterally. Exam does indicate pain with ROM of the neck and reproduction of symptoms when he leans his head left but does not explain his interossei  weakness. EMG was not diagnostic for motor neuron disease. He is 50% improved after JUAN PABLO.    PLAN:  Continue physical therapy with emphasis on establishing home exercise program.   Discontinue Lyrica since patient has not been using it. Can resume Lyrica 50mg QHS in the future if patient has return of radicular symptoms.   Discontinue Celecoxib because patient has discontinued use.    RTC as needed.    .  He had a pulmonary embolus related to varicose veins that have been treated apparently in the interval.  He completed a three-month course of anticoagulation.  It is clear for him to hold aspirin.    A1c 6.8 in 2021 then 9.4, 7.1 in 2023.  Patient apparently only on metformin and just for refill issues off of insulin, Trulicity and Amaryl which likely explains the rise sugars.  He has been monitoring with improved diet and it is getting better.         Reviewed prior 2021 hospitalization.  He was cutting his grass and acute tightness in the chest and shortness of breath.  He was diagnosed with a pulmonary embolus.  His ultrasound was negative except for the right superficial clot that has been there.  We discussed the high probability this could occur again since the night is for the pulmonary embolus presumably is the chronic superficial thrombosis and that still being present would likely indicated increased risk for PE.  He does have a follow-up with vascular scheduled early January and advised him of that not will likely be our 1st time we can assess the need for ongoing anticoagulation or least time we could interrupt anticoagulation to have a vascular procedure.  We discussed not using aspirin or any anti-inflammatories along with the blood thinner and to watch for any signs of bleeding in the stool, urine and so forth.  We discussed being careful around power tools and other such activities.  He is feeling fine with no chest pains or shortness of breath no lower extremity swelling.  He denied any trauma to  the lower extremities or any sedentary activity or long car rides and so forth prior to the PE  * Acute pulmonary embolism witho right heart strain  -Mr. Morris was admitted to inpatient status  -CTA Chest showed extensive pulmonary thromboemboli involving bilateral upper and lower lobar and segmental arteries.  No evidence of central saddle embolus.  Bowing of the interventricular septum likely reflecting right heart strain.  -Troponin elevated at 0.11  -Echo showed normal EF  -Doppler US showed a chronic non-occlusive DVT in R greater Saphenous vein  -No indication for tPA or thrombectomy  -PESI 84 consistend with 1.7 to 3.5% mortality  -Initially treated with full dose lovenox and transitioned to eliquis  -Will d/c home today on eliquis for at least 3 months.  Will refer to hematology oncology for evaluation.     Vascular treated varicose veins     He is poorly compliant with diabetic follow-up.  He said a message that he was developing bilateral leg weakness and some other unspecified symptoms we communicated back and forth over the computer.  Even the possibility of a spinal problem he was referred to Orthopedics in the interval.  He did have an interval appointment with me that apparently he reschedule due to the weather.  Orthopedic note reviewed and was felt to be secondary to diabetic neuropathy.      Reviewed neurology assessment.  Agree with seeing vascular.  He does have a swollen right lower extremity which is more swollen over the years and he has had varicose veins.  He clearly has venous insufficiency worse on the right than the left and some of this could be contributing to his pain.  He has had superficial blood clots which need to be evaluated any may not have had an ultrasound of the left leg yet.  We discussed stockings in the future and that some of this could easily be treated with laser.    Impression:  No evidence of DVT in the right lower extremity.  Chronic thrombus within the mid to  lower greater saphenous vein.  Probable chronically thrombosed varicose veins located laterally at the level of the knee and not significantly changed from prior ultrasound dated 01/10/2016.  Electronically signed by resident: Malcolm Perez  Date:                                            02/09/2021    Discussed it diabetic neuropathy.  He is taking 300 gabapentin 3 times per day.  He had Cymbalta at any says it makes him sleepy but advised him I think it might be the gabapentin more so but he can try taking the Cymbalta at night and I would agree with the Cymbalta and titration.  Gabapentin should also be increased and he can start with the nighttime dose going up to a 900 her 1200 mg if necessary.  He will call me and I will refill the prescription.  Lyrica may cause too much fluid retention and I would hold off on switching him to that until he gets his venous insufficiency changes addressed     NEURO -NM neuro in [place]  Assessment and Plan:  Abel Morris is a 53 y.o., man with peripheral polyneuropathy as a consequence of his diabetes and history of heavy alcohol intake.  His elevated protein on CSF is consistent with his longstanding, poorly controlled diabetes.  Will add other labs given his history to exclude any other exacerbating factors.     At this point, the best measure to prevent worsening is to limit use of alcohol and get blood sugar under control.   Advise an exercise program with balance and strengthening.   Advise neuropathic pain control as needed.  Gabapentin can be further maximized up to 1200mg TID as long as his kidney function tolerates.  Options include Lyrica, Trileptal, Amitriptyline  Also advise adding alpha lipoic acid 600mg daily for diabetic neuropathy management.  Advise seeing podiatry regularly for foot care in the setting of diabetes.  Advise seeing ophthalmology yearly for eye care in the setting of diabetes.   Not sure if he should see a peripheral vascular  "specialist.  Will bring this up to his PCP and defer referral to them so he can see someone more local.    Referral to pain management for painful diabetic neuropathy.      Seen Ortho:  HPI: Abel Morris is a 53 y.o. male who presents today complaining of bilateral leg pain     Duration of symptoms:  6 months  Trauma or new activity: no  Pain is constant  Has a very difficult time relaying his symptoms and responds that he is "unsure" or "it is hard to describe" in response to many questions  Occasional back pain   No numbness or paresthesias  Symptoms are not consistent with radiculopathy or myelopathy  Legs "feel tight" - describes this sensation as the feeling of a vacuum sealed back   Feels that is difficult to walk after he has been on his feet for a while -- feels uncoordinated, weak, painful   Pain localized from mid shin (anterior and posterior), radiates down to the plantar aspect of bilateral feet.     Vascular 9/21  symptomatic right lower extremity varicose veins despite medical therapy including compression s/p stab phlebectomy  -recommend cont compression with Rx stockings, elevation, dietary changes associated with water and sodium intake discussed at length with patient  -Exercise   -Recommend warm compresses, NSAID and elevation for thrombophlebitis prn  -RTC 6 mo    U/s -5/2021:  R superficial and deep venous reflux, no DVT, R GSV prox chronic thrombus, L deep and superficial vein reflux, L anterior accessory not visible.    ROS:   CONST: weight stable. EYES: no vision change. ENT: no sore throat. CV: no chest pain w/ exertion. RESP: no shortness of breath. GI: no nausea, vomiting, diarrhea. No dysphagia. : no urinary issues. MUSCULOSKELETAL: no new myalgias or arthralgias. SKIN: no new changes. NEURO: no focal deficits. PSYCH: no new issues. ENDOCRINE: no polyuria. HEME: no lymph nodes. ALLERGY: no general pruritis.      PAST MEDICAL HISTORY:                                             " "           1.  Diabetes.    Uncontrolled  , Possible neuropathy                                                          2.  Hyperlipidemia.  Lopid added   , Pravachol added                                                       3.  Low HDL.    4.  Erectile dysfunction   5.  Poor compliance   6.  epicondylitis     7.  Hoarseness -seen by ENT- did inhaler     8.  Superficial thrombosis and varicose veins right lower leg, seeing vascular       9.  MULTIPLE and large polyps  -ok  -5 yrs    10.  PE         11.  peripheral polyneuropathy as a consequence of his diabetes and history of heavy alcohol intake                                                                                                                       PAST SURGICAL HISTORY:  Vein treatment                                                                                                                             FAMILY HISTORY:  Father doing well with history of colon polyps.  Mom is      with history of breast cancer.  Three brothers and two sisters      with no known medical problems.                                                                                                                           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  Quit smoking in  and only had a brief      period of smoking.  He reports "moderate" amount of alcohol with no          frequency stated.     Few beers now and then     Gen: no distress  EYES: conjunctiva clear, non-icteric, PERRL  ENT: nose clear, nasal mucosa normal, oropharynx clear and moist, teeth good  NECK:supple, thyroid non-palpable  RESP: effort is good, lungs clear  CV: heart RRR w/o murmur, gallops or rubs; no carotid bruits, no edema  GI: abdomen soft, non-distended, non-tender, no hepatosplenomegaly  MS: gait normal, no clubbing or cyanosis of the digits.  Atrophy of intrinsics in both hands with " intrinsic muscle weakness noted  SKIN: no rashes, warm to touch       Labs reviewed, x-ray reports reviewed,      Diagnoses and all orders for this visit:    Routine medical exam, five year colonoscopy is this year so will start that process.  Overdue for labs.  -     Hemoglobin A1C; Future  -     CBC Auto Differential; Future  -     Comprehensive Metabolic Panel; Future  -     TSH; Future  -     Lipid Panel; Future  -     PSA, Screening; Future    Screening for prostate cancer  -     PSA, Screening; Future    History of colonic polyps  -     Ambulatory referral/consult to Endo Procedure ; Future    Uncontrolled type 2 diabetes mellitus with hyperglycemia, overdue for assessment and poorly compliant with follow-up and medications.  Will try to work on that.  Restart Amaryl, continue metformin, consider adding Ozempic and discuss that although he is comfortable in his wife is comfortable with his current weight it really should be closer to 200  -     Hemoglobin A1C; Future    Poor compliance    Hyperlipidemia, unspecified hyperlipidemia type, reassess today off medications since he is fasting but also reassess in three months with the start of Crestor.  -     Lipid Panel; Future    Diabetic polyneuropathy associated with type 2 diabetes mellitus    Venous insufficiency, chronic and stable    Primary hypertension    Long-term insulin use, currently not on insulin    History of pulmonary embolus (PE), seen by Oncology in the past    Other orders  -     rosuvastatin (CRESTOR) 10 MG tablet; Take 1 tablet (10 mg total) by mouth once daily.  -     metFORMIN (GLUCOPHAGE) 1000 MG tablet; Take 1 tablet by mouth twice daily  -     glimepiride (AMARYL) 2 MG tablet; Take 1 tablet (2 mg total) by mouth before breakfast.

## 2024-09-10 LAB — CRC RECOMMENDATION EXT: NORMAL

## 2024-09-18 ENCOUNTER — PATIENT OUTREACH (OUTPATIENT)
Dept: ADMINISTRATIVE | Facility: HOSPITAL | Age: 59
End: 2024-09-18
Payer: COMMERCIAL

## 2024-09-26 ENCOUNTER — PATIENT OUTREACH (OUTPATIENT)
Dept: ADMINISTRATIVE | Facility: HOSPITAL | Age: 59
End: 2024-09-26
Payer: COMMERCIAL

## 2024-09-27 ENCOUNTER — LAB VISIT (OUTPATIENT)
Dept: LAB | Facility: HOSPITAL | Age: 59
End: 2024-09-27
Attending: INTERNAL MEDICINE
Payer: COMMERCIAL

## 2024-09-27 DIAGNOSIS — E11.40 TYPE 2 DIABETES MELLITUS WITH DIABETIC NEUROPATHY, WITHOUT LONG-TERM CURRENT USE OF INSULIN: ICD-10-CM

## 2024-09-27 LAB
ESTIMATED AVG GLUCOSE: 232 MG/DL (ref 68–131)
HBA1C MFR BLD: 9.7 % (ref 4–5.6)

## 2024-09-27 PROCEDURE — 36415 COLL VENOUS BLD VENIPUNCTURE: CPT | Mod: PO | Performed by: INTERNAL MEDICINE

## 2024-09-27 PROCEDURE — 83036 HEMOGLOBIN GLYCOSYLATED A1C: CPT | Performed by: INTERNAL MEDICINE

## 2024-09-30 ENCOUNTER — CLINICAL SUPPORT (OUTPATIENT)
Dept: FAMILY MEDICINE | Facility: CLINIC | Age: 59
End: 2024-09-30
Payer: COMMERCIAL

## 2024-09-30 DIAGNOSIS — E11.40 TYPE 2 DIABETES MELLITUS WITH DIABETIC NEUROPATHY, WITHOUT LONG-TERM CURRENT USE OF INSULIN: ICD-10-CM

## 2024-09-30 PROCEDURE — 92228 IMG RTA DETC/MNTR DS PHY/QHP: CPT | Mod: 26,S$GLB,, | Performed by: OPTOMETRIST

## 2024-10-10 ENCOUNTER — HOSPITAL ENCOUNTER (EMERGENCY)
Facility: HOSPITAL | Age: 59
Discharge: HOME | End: 2024-10-10
Attending: EMERGENCY MEDICINE
Payer: COMMERCIAL

## 2024-10-10 VITALS
SYSTOLIC BLOOD PRESSURE: 142 MMHG | HEIGHT: 76 IN | DIASTOLIC BLOOD PRESSURE: 85 MMHG | HEART RATE: 88 BPM | BODY MASS INDEX: 29.22 KG/M2 | WEIGHT: 240 LBS | TEMPERATURE: 98 F | OXYGEN SATURATION: 100 % | RESPIRATION RATE: 20 BRPM

## 2024-10-10 DIAGNOSIS — R73.9 HYPERGLYCEMIA: Primary | ICD-10-CM

## 2024-10-10 LAB
ALBUMIN SERPL-MCNC: 4.4 G/DL (ref 3.3–5.5)
ALP SERPL-CCNC: 59 U/L (ref 42–141)
BILIRUB SERPL-MCNC: 0.9 MG/DL (ref 0.2–1.6)
BILIRUBIN, POC UA: NEGATIVE
BLOOD, POC UA: NEGATIVE
BUN SERPL-MCNC: 14 MG/DL (ref 7–22)
CALCIUM SERPL-MCNC: 10.9 MG/DL (ref 8–10.3)
CHLORIDE SERPL-SCNC: 94 MMOL/L (ref 98–108)
CLARITY, UA: CLEAR
COLOR, UA: YELLOW
CREAT SERPL-MCNC: 1 MG/DL (ref 0.6–1.2)
GLUCOSE SERPL-MCNC: 327 MG/DL (ref 73–118)
GLUCOSE, POC UA: ABNORMAL
HCT, POC: NORMAL
HGB, POC: NORMAL (ref 14–18)
KETONES, POC UA: NEGATIVE
LEUKOCYTE EST, POC UA: NEGATIVE
MCH, POC: NORMAL
MCHC, POC: NORMAL
MCV, POC: NORMAL
MPV, POC: NORMAL
NITRITE, POC UA: NEGATIVE
PH UR STRIP: 5 [PH] (ref 5–8)
POC ALT (SGPT): 26 U/L (ref 10–47)
POC AST (SGOT): 28 U/L (ref 11–38)
POC PLATELET COUNT: NORMAL
POC TCO2: 28 MMOL/L (ref 18–33)
POCT GLUCOSE: 268 MG/DL (ref 70–110)
POCT GLUCOSE: 345 MG/DL (ref 70–110)
POCT GLUCOSE: 371 MG/DL (ref 70–110)
POTASSIUM BLD-SCNC: 4.3 MMOL/L (ref 3.6–5.1)
PROTEIN, POC UA: NEGATIVE
PROTEIN, POC: 7.9 G/DL (ref 6.4–8.1)
RBC, POC: NORMAL
RDW, POC: NORMAL
SODIUM BLD-SCNC: 136 MMOL/L (ref 128–145)
SPECIFIC GRAVITY, POC UA: 1.02 (ref 1–1.03)
UROBILINOGEN, POC UA: 0.2 E.U./DL
WBC, POC: NORMAL

## 2024-10-10 PROCEDURE — 63600175 PHARM REV CODE 636 W HCPCS: Mod: ER | Performed by: EMERGENCY MEDICINE

## 2024-10-10 PROCEDURE — 80053 COMPREHEN METABOLIC PANEL: CPT | Mod: ER

## 2024-10-10 PROCEDURE — 96361 HYDRATE IV INFUSION ADD-ON: CPT | Mod: ER

## 2024-10-10 PROCEDURE — 25000003 PHARM REV CODE 250: Mod: ER | Performed by: EMERGENCY MEDICINE

## 2024-10-10 PROCEDURE — 82962 GLUCOSE BLOOD TEST: CPT | Mod: ER

## 2024-10-10 PROCEDURE — 85025 COMPLETE CBC W/AUTO DIFF WBC: CPT | Mod: ER

## 2024-10-10 PROCEDURE — 99284 EMERGENCY DEPT VISIT MOD MDM: CPT | Mod: 25,ER

## 2024-10-10 PROCEDURE — 96374 THER/PROPH/DIAG INJ IV PUSH: CPT | Mod: ER

## 2024-10-10 RX ORDER — METFORMIN HYDROCHLORIDE 500 MG/1
1000 TABLET ORAL
Status: COMPLETED | OUTPATIENT
Start: 2024-10-10 | End: 2024-10-10

## 2024-10-10 RX ADMIN — SODIUM CHLORIDE 1000 ML: 9 INJECTION, SOLUTION INTRAVENOUS at 08:10

## 2024-10-10 RX ADMIN — SODIUM CHLORIDE 1000 ML: 9 INJECTION, SOLUTION INTRAVENOUS at 07:10

## 2024-10-10 RX ADMIN — INSULIN HUMAN 6 UNITS: 100 INJECTION, SOLUTION PARENTERAL at 08:10

## 2024-10-10 RX ADMIN — METFORMIN HYDROCHLORIDE 1000 MG: 500 TABLET ORAL at 07:10

## 2024-10-10 NOTE — ED PROVIDER NOTES
"Encounter Date: 10/10/2024       History     Chief Complaint   Patient presents with    Hyperglycemia     PT REPORTS HIGH BLOOD SUGAR LAST NIGHT AND THIS MORNING IN 'S     58M with DM, HTN, and HLD presents with elevated glucose. Pt states he only took 1/2 DM medications yesterday. Last night he felt "funny" so he checked his glucose and it was "high." He worked overnight. This morning, he checked it again and it was 480 so he came to the ER. He has not taken his DM medications yet this morning. No specific complaints. Denies CP, SOB, abd pain, n/v/d, muscle aches.       Review of patient's allergies indicates:  No Known Allergies  Past Medical History:   Diagnosis Date    Arthritis     Diabetes mellitus type II, uncontrolled 11/28/2012    Diabetes mellitus with neurological manifestations, uncontrolled 6/13/2014    ED (erectile dysfunction) 11/28/2012    Eye injury     os hit broken orbital bone     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    History of colonic polyps 8/17/2018    Hyperlipidemia 11/28/2012    Poor compliance 1/8/2014    Primary hypertension 10/9/2021     Past Surgical History:   Procedure Laterality Date    COLONOSCOPY N/A 8/2/2016    Procedure: COLONOSCOPY;  Surgeon: Miguel Persaud MD;  Location: [facility] ENDO;  Service: Endoscopy;  Laterality: N/A;    COLONOSCOPY N/A 9/3/2019    Procedure: COLONOSCOPY;  Surgeon: Álvaro Carmichael MD;  Location: [facility] ENDO;  Service: Endoscopy;  Laterality: N/A;  confirmed appt- sp    EPIDURAL STEROID INJECTION N/A 5/16/2023    Procedure: INJECTION, STEROID, EPIDURAL, C7-T1 (LEFT SIDE) ECEN;  Surgeon: Allie Torres MD;  Location: [facility];  Service: Pain Management;  Laterality: N/A;    LUMBAR PUNCTURE N/A 09/19/2019     Family History   Problem Relation Name Age of Onset    No Known Problems Mother      Colon polyps Father  61    No Known Problems Sister      No Known Problems Brother      No Known Problems Maternal Aunt      No Known Problems Maternal " Uncle      No Known Problems Paternal Aunt      No Known Problems Paternal Uncle      Diabetes Maternal Grandmother      No Known Problems Maternal Grandfather      No Known Problems Paternal Grandmother      No Known Problems Paternal Grandfather      Amblyopia Neg Hx      Blindness Neg Hx      Cancer Neg Hx      Cataracts Neg Hx      Glaucoma Neg Hx      Hypertension Neg Hx      Macular degeneration Neg Hx      Retinal detachment Neg Hx      Strabismus Neg Hx      Stroke Neg Hx      Thyroid disease Neg Hx       Social History     Tobacco Use    Smoking status: Former     Types: Cigars    Smokeless tobacco: Never    Tobacco comments:     Patient Quit Smoking Cigars in 2000.   Substance Use Topics    Alcohol use: Yes     Alcohol/week: 6.0 standard drinks of alcohol     Types: 6 Cans of beer per week     Comment: per week    Drug use: No     Review of Systems   Constitutional:  Negative for activity change, appetite change, chills and fever.   HENT:  Negative for congestion, rhinorrhea, sneezing and sore throat.    Respiratory:  Negative for cough, chest tightness, shortness of breath and wheezing.    Cardiovascular:  Negative for chest pain and palpitations.   Gastrointestinal:  Negative for abdominal pain, diarrhea, nausea and vomiting.   Skin:  Negative for rash.   Neurological:  Negative for dizziness, light-headedness and headaches.   All other systems reviewed and are negative.      Physical Exam     Initial Vitals [10/10/24 0625]   BP Pulse Resp Temp SpO2   133/82 88 20 97.5 °F (36.4 °C) 98 %      MAP       --         Physical Exam    Nursing note and vitals reviewed.  Constitutional: He appears well-developed and well-nourished. No distress.   HENT:   Head: Normocephalic and atraumatic.   Eyes: Conjunctivae are normal.   Neck:   Normal range of motion.  Cardiovascular:  Normal rate and regular rhythm.           No murmur heard.  Pulmonary/Chest: Breath sounds normal. No respiratory distress.   Abdominal:  Bowel sounds are normal. He exhibits no distension. There is no abdominal tenderness.   Musculoskeletal:         General: No tenderness or edema. Normal range of motion.      Cervical back: Normal range of motion.     Neurological: He is alert and oriented to person, place, and time.   Skin: Skin is warm and dry. No rash noted.   Psychiatric: He has a normal mood and affect. His behavior is normal.         ED Course   Procedures  Labs Reviewed   POCT URINALYSIS W/O SCOPE - Abnormal       Result Value    Glucose, UA 3+ (*)     Bilirubin, UA Negative      Ketones, UA Negative      Spec Grav UA 1.025      Blood, UA Negative      PH, UA 5.0      Protein, UA Negative      Urobilinogen, UA 0.2      Nitrite, UA Negative      Leukocytes, UA Negative      Color, UA POC Yellow      Clarity, UA, POC Clear     POCT GLUCOSE - Abnormal    POCT Glucose 345 (*)    POCT CMP - Abnormal    Albumin, POC 4.4      Alkaline Phosphatase, POC 59      ALT (SGPT), POC 26      AST (SGOT), POC 28      POC BUN 14      Calcium, POC 10.9 (*)     POC Chloride 94 (*)     POC Creatinine 1.0      POC Glucose 327 (*)     POC Potassium 4.3      POC Sodium 136      Bilirubin, POC 0.9      POC TCO2 28      Protein, POC 7.9     POCT GLUCOSE - Abnormal    POCT Glucose 371 (*)    POCT GLUCOSE - Abnormal    POCT Glucose 268 (*)    POCT CBC    Hematocrit        Hemoglobin        RBC        WBC        MCV        MCH, POC        MCHC        RDW-CV        Platelet Count, POC        MPV       POCT URINALYSIS W/O SCOPE   POCT GLUCOSE, HAND-HELD DEVICE   POCT GLUCOSE, HAND-HELD DEVICE   POCT CMP          Imaging Results    None          Medications   sodium chloride 0.9% bolus 1,000 mL 1,000 mL (0 mLs Intravenous Stopped 10/10/24 0812)   metFORMIN tablet 1,000 mg (1,000 mg Oral Given 10/10/24 0712)   sodium chloride 0.9% bolus 1,000 mL 1,000 mL (0 mLs Intravenous Stopped 10/10/24 0917)   insulin regular injection 6 Units 0.06 mL (6 Units Intravenous Given 10/10/24  "[id number])     Medical Decision Making  58M with DM, HTN, and HLD presents with elevated glucose. Pt states he only took 1/2 DM medications yesterday. Last night he felt "funny" so he checked his glucose and it was "high." He worked overnight. This morning, he checked it again and it was 480 so he came to the ER. He has not taken his DM medications yet this morning. No specific complaints. Denies CP, SOB, abd pain, n/v/d, muscle aches. On exam, no abnormal findings. In shared decision making with pt, will order labs to look for DKA, electrolyte abnormalities, infection and ARNAV. Work up shows hyperglycemia, no DKA, dehydration or ARNAV. Electrolytes are normal. He was given IVFs, and metformin but we did not have his other med, glimepiride, in stock. Glucose improved. Pt advised to take both medications as directed daily and follow up with PCP.        Amount and/or Complexity of Data Reviewed  Labs: ordered.    Risk  OTC drugs.  Prescription drug management.                                      Clinical Impression:  Final diagnoses:  [R73.9] Hyperglycemia (Primary)          ED Disposition Condition    Discharge Stable          ED Prescriptions    None       Follow-up Information       Follow up With Specialties Details Why Contact Info    Sae Starkey MD Internal Medicine  If symptoms worsen [id number] [facility]  [place] [address]  [phone]               Maite Tong MD  10/10/24 1131    "

## 2024-10-10 NOTE — DISCHARGE INSTRUCTIONS
Take both of your medicines every day as directed. Take your morning glimepiride dose when you get home. You got your metformin in the ER. Contact your doctor for follow up next week.

## 2024-10-11 ENCOUNTER — OFFICE VISIT (OUTPATIENT)
Dept: FAMILY MEDICINE | Facility: CLINIC | Age: 59
End: 2024-10-11
Payer: COMMERCIAL

## 2024-10-11 VITALS
HEIGHT: 76 IN | WEIGHT: 236.56 LBS | HEART RATE: 88 BPM | TEMPERATURE: 98 F | OXYGEN SATURATION: 99 % | SYSTOLIC BLOOD PRESSURE: 106 MMHG | DIASTOLIC BLOOD PRESSURE: 62 MMHG | BODY MASS INDEX: 28.81 KG/M2

## 2024-10-11 DIAGNOSIS — E78.5 HYPERLIPIDEMIA, UNSPECIFIED HYPERLIPIDEMIA TYPE: ICD-10-CM

## 2024-10-11 DIAGNOSIS — E11.65 UNCONTROLLED TYPE 2 DIABETES MELLITUS WITH HYPERGLYCEMIA: ICD-10-CM

## 2024-10-11 DIAGNOSIS — E11.42 DIABETIC POLYNEUROPATHY ASSOCIATED WITH TYPE 2 DIABETES MELLITUS: ICD-10-CM

## 2024-10-11 DIAGNOSIS — Z91.199 POOR COMPLIANCE: ICD-10-CM

## 2024-10-11 DIAGNOSIS — E11.40 TYPE 2 DIABETES MELLITUS WITH DIABETIC NEUROPATHY, WITHOUT LONG-TERM CURRENT USE OF INSULIN: Primary | ICD-10-CM

## 2024-10-11 PROCEDURE — 99999 PR PBB SHADOW E&M-EST. PATIENT-LVL III: CPT | Mod: PBBFAC,COE,, | Performed by: INTERNAL MEDICINE

## 2024-10-11 RX ORDER — SEMAGLUTIDE 0.68 MG/ML
0.5 INJECTION, SOLUTION SUBCUTANEOUS
Qty: 3 ML | Refills: 12 | Status: SHIPPED | OUTPATIENT
Start: 2024-10-11

## 2024-10-11 RX ORDER — GLIMEPIRIDE 4 MG/1
4 TABLET ORAL
Qty: 90 TABLET | Refills: 2 | Status: SHIPPED | OUTPATIENT
Start: 2024-10-11 | End: 2025-10-11

## 2024-10-11 NOTE — PROGRESS NOTES
Chief complaint:  Follow-up on diabetes    58-year-old black male . Last seen 2016 then 8/18, 8/19 then 3/21, 10/21, 4/23 .  PSA 2018, 4/21 . MULTIPLE and large polyps 8/16 -ok 9/19 -5 yrs   . .  Here with his wife.  Last A1c was 7.6 , 8.3, 9.7 He is on metformin he confirms he did not really run out of anything as he is done in the past and last visit we added Amaryl 2 mg..  He ran out of Amaryl.  He did Trulicity in the past without problems but it has been a long time.  Discussed follow-up every three months so when his 90 day supply of metformin is getting low, he should call for labs and an appointment.  He did okay on Trulicity in the past but his wife said you did not want him to lose weight and at the last visit when she was here we discussed that it would be better for him to be closer to 200 lb and he is currently 236.  Patient himself is willing to take something like the Ozempic we discussed how it will help with reduced intake, weight loss all of which would help his diabetes.  We will increase his Amaryl to 4 mg per day as while titrating up on Ozempic if we can get that covered and sent it to the [facility] pharmacy.  Will have him follow-up for labs before the end of the year.  We also started him on Crestor so will reassess his lipids, LDL was not below 100    Last PSA 7/24    He was on Pravachol but just out for no particular reason.  We started him on Crestor 10 mg which will be more potent.  Discussed the benefits of statin therapy    When Patient seen 4/23 in preop consultation today from Neurosurgery Dr. Good.  Apparently has cervical disc disease causing weakness and atrophy of the intrinsic muscles in both hands.  He definitely has weakness in the left arm although he did not notice it until he was tested by Neurosurgery.  He does have radicular symptoms down the left arm as well.  Neurosurgery notes reviewed.  Apparently saw pain management had an injection and physical therapy and the nerve  impingement improved so apparently this was all cervical spondylosis and radiculopathy not any other neurological problem.  He does still have intrinsic wasting and intrinsic weakness but not getting any thing progressively worse.    Pain mgmt  DISCUSSION: Mr. Morris comes to me as part of [facility]. He was previously worked up by Dr. Mary for cervical radiculopathy. MRI shows a C6/7 foraminal protrusion with possible impingement on the left C7 nerve root. He reports not being able to carry heavy things with that arm, but there is no obvious unilateral weakness on exam. He does have interosseous weakness bilaterally. Exam does indicate pain with ROM of the neck and reproduction of symptoms when he leans his head left but does not explain his interossei weakness. EMG was not diagnostic for motor neuron disease. He is 50% improved after JUAN PABLO.    PLAN:  Continue physical therapy with emphasis on establishing home exercise program.   Discontinue Lyrica since patient has not been using it. Can resume Lyrica 50mg QHS in the future if patient has return of radicular symptoms.   Discontinue Celecoxib because patient has discontinued use.    RTC as needed.    .  He had a pulmonary embolus related to varicose veins that have been treated apparently in the interval.  He completed a three-month course of anticoagulation.  It is clear for him to hold aspirin.    A1c 6.8 in 2021 then 9.4, 7.1 in 2023.  Patient apparently only on metformin and just for refill issues off of insulin, Trulicity and Amaryl which likely explains the rise sugars.  He has been monitoring with improved diet and it is getting better.         Reviewed prior 2021 hospitalization.  He was cutting his grass and acute tightness in the chest and shortness of breath.  He was diagnosed with a pulmonary embolus.  His ultrasound was negative except for the right superficial clot that has been there.  We discussed the high probability  this could occur again since the night is for the pulmonary embolus presumably is the chronic superficial thrombosis and that still being present would likely indicated increased risk for PE.  He does have a follow-up with vascular scheduled early January and advised him of that not will likely be our 1st time we can assess the need for ongoing anticoagulation or least time we could interrupt anticoagulation to have a vascular procedure.  We discussed not using aspirin or any anti-inflammatories along with the blood thinner and to watch for any signs of bleeding in the stool, urine and so forth.  We discussed being careful around power tools and other such activities.  He is feeling fine with no chest pains or shortness of breath no lower extremity swelling.  He denied any trauma to the lower extremities or any sedentary activity or long car rides and so forth prior to the PE  * Acute pulmonary embolism witho right heart strain  -Mr. Morris was admitted to inpatient status  -CTA Chest showed extensive pulmonary thromboemboli involving bilateral upper and lower lobar and segmental arteries.  No evidence of central saddle embolus.  Bowing of the interventricular septum likely reflecting right heart strain.  -Troponin elevated at 0.11  -Echo showed normal EF  -Doppler US showed a chronic non-occlusive DVT in R greater Saphenous vein  -No indication for tPA or thrombectomy  -PESI 84 consistend with 1.7 to 3.5% mortality  -Initially treated with full dose lovenox and transitioned to eliquis  -Will d/c home today on eliquis for at least 3 months.  Will refer to hematology oncology for evaluation.     Vascular treated varicose veins     He is poorly compliant with diabetic follow-up.  He said a message that he was developing bilateral leg weakness and some other unspecified symptoms we communicated back and forth over the computer.  Even the possibility of a spinal problem he was referred to Orthopedics in the interval.   He did have an interval appointment with me that apparently he reschedule due to the weather.  Orthopedic note reviewed and was felt to be secondary to diabetic neuropathy.      Reviewed neurology assessment.  Agree with seeing vascular.  He does have a swollen right lower extremity which is more swollen over the years and he has had varicose veins.  He clearly has venous insufficiency worse on the right than the left and some of this could be contributing to his pain.  He has had superficial blood clots which need to be evaluated any may not have had an ultrasound of the left leg yet.  We discussed stockings in the future and that some of this could easily be treated with laser.    Impression:  No evidence of DVT in the right lower extremity.  Chronic thrombus within the mid to lower greater saphenous vein.  Probable chronically thrombosed varicose veins located laterally at the level of the knee and not significantly changed from prior ultrasound dated 01/10/2016.  Electronically signed by resident: Malcolm Perez  Date:                                            02/09/2021    Discussed it diabetic neuropathy.  He is taking 300 gabapentin 3 times per day.  He had Cymbalta at any says it makes him sleepy but advised him I think it might be the gabapentin more so but he can try taking the Cymbalta at night and I would agree with the Cymbalta and titration.  Gabapentin should also be increased and he can start with the nighttime dose going up to a 900 her 1200 mg if necessary.  He will call me and I will refill the prescription.  Lyrica may cause too much fluid retention and I would hold off on switching him to that until he gets his venous insufficiency changes addressed     NEURO -NM neuro in [place]  Assessment and Plan:  Abel Morris is a 53 y.o., man with peripheral polyneuropathy as a consequence of his diabetes and history of heavy alcohol intake.  His elevated protein on CSF is consistent with his  "longstanding, poorly controlled diabetes.  Will add other labs given his history to exclude any other exacerbating factors.     At this point, the best measure to prevent worsening is to limit use of alcohol and get blood sugar under control.   Advise an exercise program with balance and strengthening.   Advise neuropathic pain control as needed.  Gabapentin can be further maximized up to 1200mg TID as long as his kidney function tolerates.  Options include Lyrica, Trileptal, Amitriptyline  Also advise adding alpha lipoic acid 600mg daily for diabetic neuropathy management.  Advise seeing podiatry regularly for foot care in the setting of diabetes.  Advise seeing ophthalmology yearly for eye care in the setting of diabetes.   Not sure if he should see a peripheral vascular specialist.  Will bring this up to his PCP and defer referral to them so he can see someone more local.    Referral to pain management for painful diabetic neuropathy.      Seen Ortho:  HPI: Abel Morirs is a 53 y.o. male who presents today complaining of bilateral leg pain     Duration of symptoms:  6 months  Trauma or new activity: no  Pain is constant  Has a very difficult time relaying his symptoms and responds that he is "unsure" or "it is hard to describe" in response to many questions  Occasional back pain   No numbness or paresthesias  Symptoms are not consistent with radiculopathy or myelopathy  Legs "feel tight" - describes this sensation as the feeling of a vacuum sealed back   Feels that is difficult to walk after he has been on his feet for a while -- feels uncoordinated, weak, painful   Pain localized from mid shin (anterior and posterior), radiates down to the plantar aspect of bilateral feet.     Vascular 9/21  symptomatic right lower extremity varicose veins despite medical therapy including compression s/p stab phlebectomy  -recommend cont compression with Rx stockings, elevation, dietary changes associated with water " and sodium intake discussed at length with patient  -Exercise   -Recommend warm compresses, NSAID and elevation for thrombophlebitis prn  -RTC 6 mo    U/s -2021:  R superficial and deep venous reflux, no DVT, R GSV prox chronic thrombus, L deep and superficial vein reflux, L anterior accessory not visible.    ROS:   CONST: weight stable. EYES: no vision change. ENT: no sore throat. CV: no chest pain w/ exertion. RESP: no shortness of breath. GI: no nausea, vomiting, diarrhea. No dysphagia. : no urinary issues. MUSCULOSKELETAL: no new myalgias or arthralgias. SKIN: no new changes. NEURO: no focal deficits. PSYCH: no new issues. ENDOCRINE: no polyuria. HEME: no lymph nodes. ALLERGY: no general pruritis.      PAST MEDICAL HISTORY:                                                        1.  Diabetes.    Uncontrolled  , Possible neuropathy                                                          2.  Hyperlipidemia.  Lopid added   , Pravachol added                                                       3.  Low HDL.    4.  Erectile dysfunction   5.  Poor compliance   6.  epicondylitis     7.  Hoarseness -seen by ENT- did inhaler     8.  Superficial thrombosis and varicose veins right lower leg, seeing vascular       9.  MULTIPLE and large polyps  -ok  -5 yrs    10.  PE         11.  peripheral polyneuropathy as a consequence of his diabetes and history of heavy alcohol intake                                                                                                                       PAST SURGICAL HISTORY:  Vein treatment                                                                                                                             FAMILY HISTORY:  Father doing well with history of colon polyps.  Mom is      with history of breast cancer.  Three brothers and two sisters      with no known medical problems.                                                                       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  Quit smoking in 1991 and only had a brief      period of smoking.  He reports "moderate" amount of alcohol with no          frequency stated.     Few beers now and then     Gen: no distress  Exam otherwise deferred      Labs reviewed, x-ray reports reviewed,    Diagnoses and all orders for this visit:    Type 2 diabetes mellitus with diabetic neuropathy, without long-term current use of insulin, discussed better diet and the need to lose weight, start Ozempic, increase Amaryl to 4 mg, continue metformin.  He has had an occasional sugar that was off of his glucometer chart we discussed dietary factors that can lead to that.  He did go to urgent care and got fluids.  Sugars are back to running in the 200 range which by review of his A1c is about his average and explained that to him.  Check labs before the end of the year in orders put in and written instructions given to patient.  -     Lipid Panel; Future  -     Hemoglobin A1C; Future  -     Comprehensive Metabolic Panel; Future    Uncontrolled type 2 diabetes mellitus with hyperglycemia  -     Lipid Panel; Future  -     Hemoglobin A1C; Future  -     Comprehensive Metabolic Panel; Future    Poor compliance    Hyperlipidemia, unspecified hyperlipidemia type, reassess in the near future on Crestor  -     Lipid Panel; Future  -     Comprehensive Metabolic Panel; Future    Diabetic polyneuropathy associated with type 2 diabetes mellitus, chronic and stable neuropathy symptoms    Other orders  -     semaglutide (OZEMPIC) 0.25 mg or 0.5 mg (2 mg/3 mL) pen injector; Inject 0.5 mg into the skin every 7 days.  -     glimepiride (AMARYL) 4 MG tablet; Take 1 tablet (4 mg total) by mouth before breakfast.       "

## 2024-10-15 RX ORDER — METFORMIN HYDROCHLORIDE 1000 MG/1
TABLET ORAL
Qty: 180 TABLET | Refills: 0 | Status: SHIPPED | OUTPATIENT
Start: 2024-10-15

## 2024-10-15 NOTE — TELEPHONE ENCOUNTER
No care due was identified.  [facility] Embedded Care Due Messages. Reference number: [id number].   10/15/2024 8:05:39 AM CDT

## 2024-12-10 ENCOUNTER — TELEPHONE (OUTPATIENT)
Dept: OPHTHALMOLOGY | Facility: CLINIC | Age: 59
End: 2024-12-10
Payer: COMMERCIAL

## 2025-01-03 ENCOUNTER — TELEPHONE (OUTPATIENT)
Dept: OPHTHALMOLOGY | Facility: CLINIC | Age: 60
End: 2025-01-03
Payer: COMMERCIAL

## 2025-01-03 NOTE — TELEPHONE ENCOUNTER
Calling pt regarding a referral sent over from Dr. Mendoza. Pt did not answer. I left a message on his vm.

## 2025-03-27 ENCOUNTER — PATIENT MESSAGE (OUTPATIENT)
Dept: ADMINISTRATIVE | Facility: HOSPITAL | Age: 60
End: 2025-03-27
Payer: COMMERCIAL

## 2025-06-26 ENCOUNTER — PATIENT MESSAGE (OUTPATIENT)
Dept: ADMINISTRATIVE | Facility: HOSPITAL | Age: 60
End: 2025-06-26
Payer: COMMERCIAL

## 2025-07-06 ENCOUNTER — PATIENT MESSAGE (OUTPATIENT)
Dept: FAMILY MEDICINE | Facility: CLINIC | Age: 60
End: 2025-07-06
Payer: COMMERCIAL

## 2025-07-07 ENCOUNTER — HOSPITAL ENCOUNTER (EMERGENCY)
Facility: HOSPITAL | Age: 60
Discharge: HOME | End: 2025-07-07
Attending: EMERGENCY MEDICINE
Payer: COMMERCIAL

## 2025-07-07 VITALS
HEART RATE: 72 BPM | WEIGHT: 236 LBS | HEIGHT: 76 IN | SYSTOLIC BLOOD PRESSURE: 156 MMHG | DIASTOLIC BLOOD PRESSURE: 86 MMHG | OXYGEN SATURATION: 99 % | BODY MASS INDEX: 28.74 KG/M2 | RESPIRATION RATE: 18 BRPM | TEMPERATURE: 99 F

## 2025-07-07 DIAGNOSIS — I10 HYPERTENSION: ICD-10-CM

## 2025-07-07 DIAGNOSIS — M54.9 BACK PAIN, UNSPECIFIED BACK LOCATION, UNSPECIFIED BACK PAIN LATERALITY, UNSPECIFIED CHRONICITY: Primary | ICD-10-CM

## 2025-07-07 DIAGNOSIS — E11.42 TYPE 2 DIABETES MELLITUS WITH DIABETIC POLYNEUROPATHY, WITHOUT LONG-TERM CURRENT USE OF INSULIN: ICD-10-CM

## 2025-07-07 LAB
ABSOLUTE EOSINOPHIL (OHS): 0.05 K/UL
ABSOLUTE MONOCYTE (OHS): 0.43 K/UL (ref 0.3–1)
ABSOLUTE NEUTROPHIL COUNT (OHS): 2.69 K/UL (ref 1.8–7.7)
ALBUMIN SERPL BCP-MCNC: 3.9 G/DL (ref 3.5–5.2)
ALP SERPL-CCNC: 82 UNIT/L (ref 40–150)
ALT SERPL W/O P-5'-P-CCNC: 21 UNIT/L (ref 10–44)
ANION GAP (OHS): 11 MMOL/L (ref 8–16)
AST SERPL-CCNC: 22 UNIT/L (ref 11–45)
BASOPHILS # BLD AUTO: 0.01 K/UL
BASOPHILS NFR BLD AUTO: 0.2 %
BILIRUB SERPL-MCNC: 0.7 MG/DL (ref 0.1–1)
BILIRUB UR QL STRIP.AUTO: NEGATIVE
BNP SERPL-MCNC: <10 PG/ML (ref 0–99)
BUN SERPL-MCNC: 12 MG/DL (ref 6–20)
CALCIUM SERPL-MCNC: 8.9 MG/DL (ref 8.7–10.5)
CHLORIDE SERPL-SCNC: 105 MMOL/L (ref 95–110)
CLARITY UR: CLEAR
CO2 SERPL-SCNC: 19 MMOL/L (ref 23–29)
COLOR UR AUTO: YELLOW
CREAT SERPL-MCNC: 0.9 MG/DL (ref 0.5–1.4)
ERYTHROCYTE [DISTWIDTH] IN BLOOD BY AUTOMATED COUNT: 12.5 % (ref 11.5–14.5)
GFR SERPLBLD CREATININE-BSD FMLA CKD-EPI: >60 ML/MIN/1.73/M2
GLUCOSE SERPL-MCNC: 234 MG/DL (ref 70–110)
GLUCOSE UR QL STRIP: ABNORMAL
HCT VFR BLD AUTO: 42.4 % (ref 40–54)
HGB BLD-MCNC: 14.5 GM/DL (ref 14–18)
HGB UR QL STRIP: NEGATIVE
IMM GRANULOCYTES # BLD AUTO: 0.02 K/UL (ref 0–0.04)
IMM GRANULOCYTES NFR BLD AUTO: 0.5 % (ref 0–0.5)
KETONES UR QL STRIP: NEGATIVE
LEUKOCYTE ESTERASE UR QL STRIP: NEGATIVE
LYMPHOCYTES # BLD AUTO: 1.2 K/UL (ref 1–4.8)
MCH RBC QN AUTO: 29.8 PG (ref 27–31)
MCHC RBC AUTO-ENTMCNC: 34.2 G/DL (ref 32–36)
MCV RBC AUTO: 87 FL (ref 82–98)
NITRITE UR QL STRIP: NEGATIVE
NUCLEATED RBC (/100WBC) (OHS): 0 /100 WBC
PH UR STRIP: 7 [PH]
PLATELET # BLD AUTO: 167 K/UL (ref 150–450)
PMV BLD AUTO: 9.9 FL (ref 9.2–12.9)
POCT GLUCOSE: 245 MG/DL (ref 70–110)
POTASSIUM SERPL-SCNC: 4.3 MMOL/L (ref 3.5–5.1)
PROT SERPL-MCNC: 7.2 GM/DL (ref 6–8.4)
PROT UR QL STRIP: NEGATIVE
RBC # BLD AUTO: 4.87 M/UL (ref 4.6–6.2)
RELATIVE EOSINOPHIL (OHS): 1.1 %
RELATIVE LYMPHOCYTE (OHS): 27.3 % (ref 18–48)
RELATIVE MONOCYTE (OHS): 9.8 % (ref 4–15)
RELATIVE NEUTROPHIL (OHS): 61.1 % (ref 38–73)
SODIUM SERPL-SCNC: 135 MMOL/L (ref 136–145)
SP GR UR STRIP: 1.01
TROPONIN I SERPL DL<=0.01 NG/ML-MCNC: <0.006 NG/ML
UROBILINOGEN UR STRIP-ACNC: NEGATIVE EU/DL
WBC # BLD AUTO: 4.4 K/UL (ref 3.9–12.7)

## 2025-07-07 PROCEDURE — 93005 ELECTROCARDIOGRAM TRACING: CPT

## 2025-07-07 PROCEDURE — 93010 ELECTROCARDIOGRAM REPORT: CPT | Mod: COE,,, | Performed by: INTERNAL MEDICINE

## 2025-07-07 PROCEDURE — 82962 GLUCOSE BLOOD TEST: CPT

## 2025-07-07 PROCEDURE — 99285 EMERGENCY DEPT VISIT HI MDM: CPT | Mod: 25

## 2025-07-07 PROCEDURE — 81003 URINALYSIS AUTO W/O SCOPE: CPT | Performed by: EMERGENCY MEDICINE

## 2025-07-07 PROCEDURE — 80053 COMPREHEN METABOLIC PANEL: CPT | Performed by: EMERGENCY MEDICINE

## 2025-07-07 PROCEDURE — 96374 THER/PROPH/DIAG INJ IV PUSH: CPT

## 2025-07-07 PROCEDURE — 83880 ASSAY OF NATRIURETIC PEPTIDE: CPT | Performed by: EMERGENCY MEDICINE

## 2025-07-07 PROCEDURE — 96372 THER/PROPH/DIAG INJ SC/IM: CPT | Performed by: EMERGENCY MEDICINE

## 2025-07-07 PROCEDURE — 25000003 PHARM REV CODE 250: Performed by: EMERGENCY MEDICINE

## 2025-07-07 PROCEDURE — 84484 ASSAY OF TROPONIN QUANT: CPT | Performed by: EMERGENCY MEDICINE

## 2025-07-07 PROCEDURE — 63600175 PHARM REV CODE 636 W HCPCS: Performed by: EMERGENCY MEDICINE

## 2025-07-07 PROCEDURE — 85025 COMPLETE CBC W/AUTO DIFF WBC: CPT | Performed by: EMERGENCY MEDICINE

## 2025-07-07 RX ORDER — ORPHENADRINE CITRATE 30 MG/ML
30 INJECTION INTRAMUSCULAR; INTRAVENOUS
Status: COMPLETED | OUTPATIENT
Start: 2025-07-07 | End: 2025-07-07

## 2025-07-07 RX ORDER — TIZANIDINE 4 MG/1
4 TABLET ORAL EVERY 8 HOURS PRN
Qty: 25 TABLET | Refills: 0 | Status: SHIPPED | OUTPATIENT
Start: 2025-07-07 | End: 2025-07-17

## 2025-07-07 RX ORDER — HYDROCODONE BITARTRATE AND ACETAMINOPHEN 5; 325 MG/1; MG/1
1 TABLET ORAL
Refills: 0 | Status: COMPLETED | OUTPATIENT
Start: 2025-07-07 | End: 2025-07-07

## 2025-07-07 RX ORDER — IBUPROFEN 800 MG/1
800 TABLET, FILM COATED ORAL EVERY 6 HOURS PRN
Qty: 20 TABLET | Refills: 0 | Status: SHIPPED | OUTPATIENT
Start: 2025-07-07

## 2025-07-07 RX ORDER — HYDROCODONE BITARTRATE AND ACETAMINOPHEN 5; 325 MG/1; MG/1
1 TABLET ORAL EVERY 6 HOURS PRN
Qty: 20 TABLET | Refills: 0 | Status: SHIPPED | OUTPATIENT
Start: 2025-07-07 | End: 2025-07-17

## 2025-07-07 RX ORDER — METFORMIN HYDROCHLORIDE 1000 MG/1
1000 TABLET ORAL 2 TIMES DAILY WITH MEALS
Qty: 180 TABLET | Refills: 3 | Status: SHIPPED | OUTPATIENT
Start: 2025-07-07 | End: 2026-07-07

## 2025-07-07 RX ORDER — LOSARTAN POTASSIUM 50 MG/1
50 TABLET ORAL DAILY
Qty: 90 TABLET | Refills: 3 | Status: SHIPPED | OUTPATIENT
Start: 2025-07-07 | End: 2025-07-09 | Stop reason: SDUPTHER

## 2025-07-07 RX ORDER — KETOROLAC TROMETHAMINE 30 MG/ML
15 INJECTION, SOLUTION INTRAMUSCULAR; INTRAVENOUS
Status: COMPLETED | OUTPATIENT
Start: 2025-07-07 | End: 2025-07-07

## 2025-07-07 RX ADMIN — ORPHENADRINE CITRATE 30 MG: 30 INJECTION, SOLUTION INTRAMUSCULAR; INTRAVENOUS at 09:07

## 2025-07-07 RX ADMIN — HYDROCODONE BITARTRATE AND ACETAMINOPHEN 1 TABLET: 5; 325 TABLET ORAL at 09:07

## 2025-07-07 RX ADMIN — KETOROLAC TROMETHAMINE 15 MG: 30 INJECTION, SOLUTION INTRAMUSCULAR; INTRAVENOUS at 09:07

## 2025-07-07 NOTE — ED TRIAGE NOTES
Received pt from triage, awake, afebrile, not in respiratory distress, oriented, with complain of right lower back pain,pitting edema on  bilateral  lower extremities, less sensation on right lower leg. Pt Denies SOB, chest pain, dizziness, nausea, vomiting,headache. Pt have history of diabetic neuropathy. Placed to bed attached to leads.

## 2025-07-07 NOTE — Clinical Note
"Abel"Rayshawn Morris was seen and treated in our emergency department on 7/7/2025.  He may return to work on 07/10/2025.       If you have any questions or concerns, please don't hesitate to call.      Fernando Driscoll MD"

## 2025-07-07 NOTE — ED NOTES
Assumed care of Abel Romero Arturo, a 59 y.o. male who presented to the ED w/ complaint of lower back pain and bilateral leg swelling. Pt given urinal to provide urine sample, no new complaints at this time. Patient is AAOx3, VSS, NAD. Denies CP, SOB, N/V/D/C, cough, fever, numbness, or tingling. Bed locked, in lowest position, bed rails up x2, call light in reach, all monitoring attached, family at bedside.    Triage note:  Chief Complaint   Patient presents with    Back Pain     Pt arrived to the ED due to lower back pain since Thursday. Reports that he does heavy lifting daily for work. Also reports bilateral leg swelling. Denies any issues with urination or having a BM.     Leg Swelling     Review of patient's allergies indicates:  No Known Allergies  Past Medical History:   Diagnosis Date    Arthritis     Diabetes mellitus type II, uncontrolled 11/28/2012    Diabetes mellitus with neurological manifestations, uncontrolled 6/13/2014    ED (erectile dysfunction) 11/28/2012    Eye injury     os hit broken orbital bone     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    History of colonic polyps 8/17/2018    Hyperlipidemia 11/28/2012    Poor compliance 1/8/2014    Primary hypertension 10/9/2021

## 2025-07-07 NOTE — ED PROVIDER NOTES
"Encounter Date: 7/7/2025    SCRIBE #1 NOTE: I, Juancarlos Bautista, am scribing for, and in the presence of,  Fernando Driscoll MD. I have scribed the following portions of the note - the EKG reading. Other sections scribed: HPI, ROS, PE, MDM.       History     Chief Complaint   Patient presents with    Back Pain     Pt arrived to the ED due to lower back pain since Thursday. Reports that he does heavy lifting daily for work. Also reports bilateral leg swelling. Denies any issues with urination or having a BM.     Leg Swelling     Abel Morris is a 59 y.o. male, with multiple PMHx including hypertension, T2DM (uncontrolled), HDL lipoprotein deficiency, hyperlipidemia, hypokalemia, DDD, and arthritis, who presents to the ED with lower back pain x4 days. Patient reports associated symptoms of bilateral leg swelling. Notes that he does heavy lifting for work. He states that the pain worsens with movements and alleviates when he's doing nothing. Patient reports treating his diabetes with "natural supplements." States that he "don't like the medical route, since it causes issues in the long run." Reports that his blood glucose level when his symptoms started was around 427, but states that it is around 191 at this time when he checked this morning. Endorses attempted treatment for his symptoms yesterday morning with Tylenol and Ibuprofen with mild relief.  Denies any past history of back pain. Denies any recent injury/ fall/ trauma. Denies urinary/ BM concerns, numbness, abdominal pain, nausea, vomiting, fever, rash, calf pain, SOB, or other associated symptoms.       The history is provided by the patient. No  was used.     Review of patient's allergies indicates:  No Known Allergies  Past Medical History:   Diagnosis Date    Arthritis     Diabetes mellitus type II, uncontrolled 11/28/2012    Diabetes mellitus with neurological manifestations, uncontrolled 6/13/2014    ED (erectile " dysfunction) 11/28/2012    Eye injury     os hit broken orbital bone     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    History of colonic polyps 8/17/2018    Hyperlipidemia 11/28/2012    Poor compliance 1/8/2014    Primary hypertension 10/9/2021     Past Surgical History:   Procedure Laterality Date    COLONOSCOPY N/A 8/2/2016    Procedure: COLONOSCOPY;  Surgeon: Miguel Persaud MD;  Location: [facility] ENDO;  Service: Endoscopy;  Laterality: N/A;    COLONOSCOPY N/A 9/3/2019    Procedure: COLONOSCOPY;  Surgeon: Álvaro Carmichael MD;  Location: [facility] ENDO;  Service: Endoscopy;  Laterality: N/A;  confirmed appt- sp    EPIDURAL STEROID INJECTION N/A 5/16/2023    Procedure: INJECTION, STEROID, EPIDURAL, C7-T1 (LEFT SIDE) ECEN;  Surgeon: Allie Torres MD;  Location: [facility];  Service: Pain Management;  Laterality: N/A;    LUMBAR PUNCTURE N/A 09/19/2019     Family History   Problem Relation Name Age of Onset    No Known Problems Mother      Colon polyps Father  61    No Known Problems Sister      No Known Problems Brother      No Known Problems Maternal Aunt      No Known Problems Maternal Uncle      No Known Problems Paternal Aunt      No Known Problems Paternal Uncle      Diabetes Maternal Grandmother      No Known Problems Maternal Grandfather      No Known Problems Paternal Grandmother      No Known Problems Paternal Grandfather      Amblyopia Neg Hx      Blindness Neg Hx      Cancer Neg Hx      Cataracts Neg Hx      Glaucoma Neg Hx      Hypertension Neg Hx      Macular degeneration Neg Hx      Retinal detachment Neg Hx      Strabismus Neg Hx      Stroke Neg Hx      Thyroid disease Neg Hx       Social History[1]  Review of Systems   Constitutional: Negative.  Negative for chills and fever.   HENT:  Negative for congestion and sore throat.    Respiratory:  Negative for shortness of breath.    Cardiovascular:  Positive for leg swelling (bilateral). Negative for chest pain.   Gastrointestinal:  Negative for abdominal pain,  nausea and vomiting.   Genitourinary:  Negative for dysuria, frequency and hematuria.   Musculoskeletal:  Positive for back pain (lower).   Neurological:  Negative for numbness.       Physical Exam     Initial Vitals [25 0551]   BP Pulse Resp Temp SpO2   (!) 159/91 85 20 98.6 °F (37 °C) 98 %      MAP       --         Physical Exam    Nursing note and vitals reviewed.  Constitutional: He appears well-developed and well-nourished.   HENT:   Head: Normocephalic and atraumatic.   Eyes: EOM are normal. Pupils are equal, round, and reactive to light.   Neck: Neck supple. No JVD present.   Normal range of motion.  Cardiovascular:  Normal rate, regular rhythm, normal heart sounds and intact distal pulses.     Exam reveals no gallop and no friction rub.       No murmur heard.  Pulmonary/Chest: Breath sounds normal. No respiratory distress.   Abdominal: Abdomen is soft. Bowel sounds are normal. There is no abdominal tenderness.   Musculoskeletal:         General: Normal range of motion.      Cervical back: Normal range of motion and neck supple.      Right lower le+ Edema present.      Left lower le+ Edema present.      Comments: Right lateral lumbar tenderness. Chronic venous statis, changes hyperpigmentation.      Lymphadenopathy:     He has no cervical adenopathy.   Neurological: He is alert and oriented to person, place, and time. He has normal strength. GCS score is 15. GCS eye subscore is 4. GCS verbal subscore is 5. GCS motor subscore is 6.   Skin: Skin is warm and dry. Capillary refill takes less than 2 seconds.   Psychiatric: He has a normal mood and affect. Thought content normal.         ED Course   Procedures  Labs Reviewed   COMPREHENSIVE METABOLIC PANEL - Abnormal       Result Value    Sodium 135 (*)     Potassium 4.3      Chloride 105      CO2 19 (*)     Glucose 234 (*)     BUN 12      Creatinine 0.9      Calcium 8.9      Protein Total 7.2      Albumin 3.9      Bilirubin Total 0.7      ALP 82       AST 22      ALT 21      Anion Gap 11      eGFR >60      Narrative:     Specimen slightly hemolyzed.   URINALYSIS, REFLEX TO URINE CULTURE - Abnormal    Color, UA Yellow      Appearance, UA Clear      pH, UA 7.0      Spec Grav UA 1.010      Protein, UA Negative      Glucose, UA 2+ (*)     Ketones, UA Negative      Bilirubin, UA Negative      Blood, UA Negative      Nitrites, UA Negative      Urobilinogen, UA Negative      Leukocyte Esterase, UA Negative     POCT GLUCOSE - Abnormal    POCT Glucose 245 (*)    TROPONIN I - Normal    Troponin-I <0.006     B-TYPE NATRIURETIC PEPTIDE - Normal    BNP <10     CBC WITH DIFFERENTIAL - Normal    WBC 4.40      RBC 4.87      HGB 14.5      HCT 42.4      MCV 87      MCH 29.8      MCHC 34.2      RDW 12.5      Platelet Count 167      MPV 9.9      Nucleated RBC 0      Neut % 61.1      Lymph % 27.3      Mono % 9.8      Eos % 1.1      Basophil % 0.2      Imm Grans % 0.5      Neut # 2.69      Lymph # 1.20      Mono # 0.43      Eos # 0.05      Baso # 0.01      Imm Grans # 0.02     CBC W/ AUTO DIFFERENTIAL    Narrative:     The following orders were created for panel order CBC auto differential.  Procedure                               Abnormality         Status                     ---------                               -----------         ------                     CBC with Differential[[id number]]       Normal              Final result                 Please view results for these tests on the individual orders.   GREY TOP URINE HOLD    Extra Tube Hold for add-ons.       EKG Readings: (Independently Interpreted)   Rhythm: Normal Sinus Rhythm. Heart Rate: 80. Ectopy: No Ectopy. Conduction: Normal. ST Segments: Normal ST Segments. T Waves: Normal. Clinical Impression: Normal Sinus Rhythm     ECG Results              EKG 12-lead (Final result)        Collection Time Result Time QRS Duration OHS QTC Calculation    07/07/25 06:36:18 07/09/25 18:29:48 76 445                     Final result  "by Interface, Lab In [facility] (07/09/25 18:29:55)                   Narrative:    Test Reason : I10,    Vent. Rate :  80 BPM     Atrial Rate :  80 BPM     P-R Int : 170 ms          QRS Dur :  76 ms      QT Int : 386 ms       P-R-T Axes :  78  51  54 degrees    QTcB Int : 445 ms    Normal sinus rhythm  Anterior infarct When compared with ECG of 08-Oct-2021 18:18,  Significant changes have occurred  Confirmed by Luis Fernando Hodges ([id number]) on 7/9/2025 6:29:46 PM    Referred By: AAAREFERRAL SELF           Confirmed By: Luis Fernando Hodges                                  Imaging Results              X-Ray Chest AP Portable (Final result)  Result time 07/07/25 07:15:00      Final result by Romero Magaña MD (07/07/25 07:15:00)                   Impression:      No focal consolidation identified on this single view.  No detrimental change when compared with 04/27/2023.      Electronically signed by: Romero Magaña MD  Date:    07/07/2025  Time:    07:15               Narrative:    EXAMINATION:  XR CHEST AP PORTABLE    CLINICAL HISTORY:  Provided history is "bilateral leg swelling;  ".    TECHNIQUE:  One view of the chest.    COMPARISON:  04/27/2023.    FINDINGS:  Cardiac wires overlie the chest.  Cardiac silhouette is not enlarged.  No focal consolidation.  No sizable pleural effusion.  No pneumothorax.                                       Medications   ketorolac injection 15 mg (15 mg Intravenous Given 7/7/25 0905)   orphenadrine injection 30 mg (30 mg Intramuscular Given 7/7/25 0904)   HYDROcodone-acetaminophen 5-325 mg per tablet 1 tablet (1 tablet Oral Given 7/7/25 0904)     Medical Decision Making  This is an emergent evaluation of a 59 y.o. male who presents with multiple PMHx including hypertension, T2DM (uncontrolled), HDL lipoprotein deficiency, hyperlipidemia, hypokalemia, DDD, and arthritis, who presents to the ED with lower back pain x4 days.. The patient was seen and examined. The history and " physical exam was obtained. The nursing notes and vital signs were reviewed. Secondary to symptoms and examination findings, I ordered labs, imaging, and EKG. Will treat with ketorolac injection 15 mg, orphenadrine injection 30 mg, and hydrocodone acetaminophen 5-325 mg.     Differential Diagnosis includes, but is not limited to:  Cauda equina syndrome, diskitis/osteomyelitis, epidural/paraspinal abscess, AAA, aortic dissection, post-op/hardware infection, trauma/vertebral fracture, spinal cord injury, disc herniation, spinal stenosis, sciatica, radiculopathy, neoplasm, lumbar muscle strain, muscle spasm, neuropathic pain, UTI/pyelonephritis, nephrolithiasis, peripheral edema, uncontrolled hypertension, renal insufficiency, liver failure, DVT, cellulitis, edema related to trauma, hypothyroidism        Amount and/or Complexity of Data Reviewed  Labs: ordered. Decision-making details documented in ED Course.  Radiology: ordered. Decision-making details documented in ED Course.  ECG/medicine tests: ordered and independent interpretation performed. Decision-making details documented in ED Course.    Risk  Prescription drug management.            Scribe Attestation:   Scribe #1: I performed the above scribed service and the documentation accurately describes the services I performed. I attest to the accuracy of the note.                               Clinical Impression:  Final diagnoses:  [I10] Hypertension  [M54.9] Back pain, unspecified back location, unspecified back pain laterality, unspecified chronicity (Primary)  [E11.42] Type 2 diabetes mellitus with diabetic polyneuropathy, without long-term current use of insulin          ED Disposition Condition    Discharge Stable          ED Prescriptions       Medication Sig Dispense Start Date End Date Auth. Provider    losartan (COZAAR) 50 MG tablet () Take 1 tablet (50 mg total) by mouth once daily. 90 tablet 2025 Fernando Driscoll MD     metFORMIN (GLUCOPHAGE) 1000 MG tablet Take 1 tablet (1,000 mg total) by mouth 2 (two) times daily with meals. 180 tablet 2025 Fernando Driscoll MD    HYDROcodone-acetaminophen (NORCO) 5-325 mg per tablet () Take 1 tablet by mouth every 6 (six) hours as needed for Pain. 20 tablet 2025 Fernando Driscoll MD    tiZANidine (ZANAFLEX) 4 MG tablet () Take 1 tablet (4 mg total) by mouth every 8 (eight) hours as needed (muscle spasm). 25 tablet 2025 Fernando Driscoll MD    ibuprofen (ADVIL,MOTRIN) 800 MG tablet Take 1 tablet (800 mg total) by mouth every 6 (six) hours as needed for Pain. 20 tablet 2025 -- Fernando Driscoll MD          Follow-up Information       Follow up With Specialties Details Why Contact Info    Sae Starkey MD Internal Medicine Schedule an appointment as soon as possible for a visit   [address] [facility] [id number] [phone]      [facility] - Emergency Dept Emergency Medicine  As needed [address] [facility] [place] [address] [phone]            I, Fernando Driscoll, personally performed the services described in this documentation. All medical record entries made by the scribe were at my direction and in my presence. I have reviewed the chart and agree that the record reflects my personal performance and is accurate and complete.      DISCLAIMER: This note was prepared with [employer] voice recognition transcription software. Garbled syntax, mangled pronouns, and other bizarre constructions may be attributed to that software system.         [1]   Social History  Tobacco Use    Smoking status: Former     Types: Cigars    Smokeless tobacco: Never    Tobacco comments:     Patient Quit Smoking Cigars in .   Substance Use Topics    Alcohol use: Yes     Alcohol/week: 6.0 standard drinks of alcohol     Types: 6 Cans of beer per week     Comment: per week    Drug  use: No        Fernando Driscoll MD  07/21/25 1208

## 2025-07-08 LAB — HOLD SPECIMEN: NORMAL

## 2025-07-09 ENCOUNTER — OFFICE VISIT (OUTPATIENT)
Dept: FAMILY MEDICINE | Facility: CLINIC | Age: 60
End: 2025-07-09
Payer: COMMERCIAL

## 2025-07-09 ENCOUNTER — LAB VISIT (OUTPATIENT)
Dept: LAB | Facility: HOSPITAL | Age: 60
End: 2025-07-09
Attending: INTERNAL MEDICINE
Payer: COMMERCIAL

## 2025-07-09 VITALS
HEIGHT: 76 IN | SYSTOLIC BLOOD PRESSURE: 146 MMHG | OXYGEN SATURATION: 99 % | TEMPERATURE: 99 F | HEART RATE: 88 BPM | DIASTOLIC BLOOD PRESSURE: 76 MMHG | WEIGHT: 232.81 LBS | BODY MASS INDEX: 28.35 KG/M2

## 2025-07-09 DIAGNOSIS — E78.5 HYPERLIPIDEMIA, UNSPECIFIED HYPERLIPIDEMIA TYPE: ICD-10-CM

## 2025-07-09 DIAGNOSIS — E11.65 UNCONTROLLED TYPE 2 DIABETES MELLITUS WITH HYPERGLYCEMIA: ICD-10-CM

## 2025-07-09 DIAGNOSIS — E11.40 TYPE 2 DIABETES MELLITUS WITH DIABETIC NEUROPATHY, WITHOUT LONG-TERM CURRENT USE OF INSULIN: ICD-10-CM

## 2025-07-09 DIAGNOSIS — S39.012A LUMBAR STRAIN, INITIAL ENCOUNTER: Primary | ICD-10-CM

## 2025-07-09 DIAGNOSIS — Z86.0100 HISTORY OF COLONIC POLYPS: ICD-10-CM

## 2025-07-09 DIAGNOSIS — I87.2 VENOUS INSUFFICIENCY: ICD-10-CM

## 2025-07-09 DIAGNOSIS — E11.42 DIABETIC POLYNEUROPATHY ASSOCIATED WITH TYPE 2 DIABETES MELLITUS: ICD-10-CM

## 2025-07-09 DIAGNOSIS — Z91.199 POOR COMPLIANCE: ICD-10-CM

## 2025-07-09 DIAGNOSIS — I10 PRIMARY HYPERTENSION: ICD-10-CM

## 2025-07-09 LAB
ALBUMIN SERPL BCP-MCNC: 4.4 G/DL (ref 3.5–5.2)
ALP SERPL-CCNC: 93 UNIT/L (ref 40–150)
ALT SERPL W/O P-5'-P-CCNC: 24 UNIT/L (ref 10–44)
ANION GAP (OHS): 9 MMOL/L (ref 8–16)
AST SERPL-CCNC: 21 UNIT/L (ref 11–45)
BILIRUB SERPL-MCNC: 0.8 MG/DL (ref 0.1–1)
BUN SERPL-MCNC: 11 MG/DL (ref 6–20)
CALCIUM SERPL-MCNC: 9.4 MG/DL (ref 8.7–10.5)
CHLORIDE SERPL-SCNC: 102 MMOL/L (ref 95–110)
CHOLEST SERPL-MCNC: 214 MG/DL (ref 120–199)
CHOLEST/HDLC SERPL: 5.4 {RATIO} (ref 2–5)
CO2 SERPL-SCNC: 26 MMOL/L (ref 23–29)
CREAT SERPL-MCNC: 0.9 MG/DL (ref 0.5–1.4)
EAG (OHS): 160 MG/DL (ref 68–131)
GFR SERPLBLD CREATININE-BSD FMLA CKD-EPI: >60 ML/MIN/1.73/M2
GLUCOSE SERPL-MCNC: 143 MG/DL (ref 70–110)
HBA1C MFR BLD: 7.2 % (ref 4–5.6)
HDLC SERPL-MCNC: 40 MG/DL (ref 40–75)
HDLC SERPL: 18.7 % (ref 20–50)
LDLC SERPL CALC-MCNC: 151.6 MG/DL (ref 63–159)
NONHDLC SERPL-MCNC: 174 MG/DL
OHS QRS DURATION: 76 MS
OHS QTC CALCULATION: 445 MS
POTASSIUM SERPL-SCNC: 4.3 MMOL/L (ref 3.5–5.1)
PROT SERPL-MCNC: 7.6 GM/DL (ref 6–8.4)
SODIUM SERPL-SCNC: 137 MMOL/L (ref 136–145)
TRIGL SERPL-MCNC: 112 MG/DL (ref 30–150)

## 2025-07-09 PROCEDURE — G2211 COMPLEX E/M VISIT ADD ON: HCPCS | Mod: S$GLB,COE,, | Performed by: INTERNAL MEDICINE

## 2025-07-09 PROCEDURE — 82040 ASSAY OF SERUM ALBUMIN: CPT

## 2025-07-09 PROCEDURE — 36415 COLL VENOUS BLD VENIPUNCTURE: CPT | Mod: PO

## 2025-07-09 PROCEDURE — 99214 OFFICE O/P EST MOD 30 MIN: CPT | Mod: S$GLB,COE,, | Performed by: INTERNAL MEDICINE

## 2025-07-09 PROCEDURE — 4010F ACE/ARB THERAPY RXD/TAKEN: CPT | Mod: CPTII,S$GLB,COE, | Performed by: INTERNAL MEDICINE

## 2025-07-09 PROCEDURE — 83036 HEMOGLOBIN GLYCOSYLATED A1C: CPT

## 2025-07-09 PROCEDURE — 99999 PR PBB SHADOW E&M-EST. PATIENT-LVL III: CPT | Mod: PBBFAC,COE,, | Performed by: INTERNAL MEDICINE

## 2025-07-09 PROCEDURE — 3077F SYST BP >= 140 MM HG: CPT | Mod: CPTII,S$GLB,COE, | Performed by: INTERNAL MEDICINE

## 2025-07-09 PROCEDURE — 3078F DIAST BP <80 MM HG: CPT | Mod: CPTII,S$GLB,COE, | Performed by: INTERNAL MEDICINE

## 2025-07-09 PROCEDURE — 1159F MED LIST DOCD IN RCRD: CPT | Mod: CPTII,S$GLB,COE, | Performed by: INTERNAL MEDICINE

## 2025-07-09 PROCEDURE — 3008F BODY MASS INDEX DOCD: CPT | Mod: CPTII,S$GLB,COE, | Performed by: INTERNAL MEDICINE

## 2025-07-09 PROCEDURE — 80061 LIPID PANEL: CPT

## 2025-07-09 RX ORDER — GLIMEPIRIDE 4 MG/1
4 TABLET ORAL
Qty: 90 TABLET | Refills: 2 | Status: SHIPPED | OUTPATIENT
Start: 2025-07-09 | End: 2026-07-09

## 2025-07-09 RX ORDER — LOSARTAN POTASSIUM 50 MG/1
50 TABLET ORAL DAILY
Qty: 90 TABLET | Refills: 3 | Status: SHIPPED | OUTPATIENT
Start: 2025-07-09 | End: 2026-07-09

## 2025-07-09 RX ORDER — ROSUVASTATIN CALCIUM 10 MG/1
10 TABLET, COATED ORAL DAILY
Qty: 90 TABLET | Refills: 3 | Status: SHIPPED | OUTPATIENT
Start: 2025-07-09 | End: 2026-07-09

## 2025-07-09 NOTE — PROGRESS NOTES
Chief complaint:  Follow-up on diabetes and ER    59-year-old black male . Last seen 2016 then 8/18, 8/19 then 3/21, 10/21, 4/23, 10/24.  Patient developed pain in the right lower back.  Looks like he was diagnose with a musculoskeletal pain.  I explained the pathophysiology.  It is reproducible right lower paraspinal pain.  He was planning on going and cutting his grass this afternoon and discuss that he needs to apply heat and do stretching prior to activities and possibly where a lumbar support belt.  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  He has a form at home that he forgot regarding returned to work and he can fill it out and you can return when scheduled.  We discussed that the injury to the muscle may take 4-6 weeks to fully heal so he needs to be very careful with his proper lifting.  Sounds like he was using his work as a form of exercise with lots of repetitive heavy lifting.    Patient also says he got off all his medications and was taking berberine magnesium zinc and other supplements.  He says her sugars were running normal in his was off meds for about two months.  A week or so ago however he was on vacation and when he returned his sugar was 400.  It was 250 at the ER.  He was put back on metformin by the ER but says he was having diarrhea so discuss we do not want him to have diarrhea so he should titrate up slowly and only take the amount of metformin he tolerates.  Resuming sugars will still be uncontrolled an A1c high will also refill his glimepiride.  He did take the Ozempic without problems for two months but then just discontinued.  His wife does not want him to lose weight as he reports.  He is willing to get A1c today.  He has also not been on the Crestor so will reassess lipids presuming it to be high and will use those numbers to help justify him to get back on his Crestor.      MULTIPLE and large polyps 8/16 -ok 9/19, then 9/24 at  "[facility] -5 yrs   . .  Here alone, previously with his wife..  Last A1c was 7.6 , 8.3, 9.7 Sept. He is on metformin he confirms he did not really run out of anything as he is done in the past and last visit we added Amaryl 2 mg..  He ran out of Amaryl.  He did Trulicity in the past without problems but it has been a long time.  Discussed follow-up every three months so when his 90 day supply of metformin is getting low, he should call for labs and an appointment -not done.  He did okay on Trulicity in the past but his wife said you did not want him to lose weight and at the last visit when she was here we discussed that it would be better for him to be closer to 200 lb and he is currently 236.  Patient himself is willing to take something like the Ozempic we discussed how it will help with reduced intake, weight loss all of which would help his diabetes.  9/24 We  increased his Amaryl to 4 mg per day as while titrating up on Ozempic if we can get that covered and sent it to the [facility] pharmacy.  Will have him follow-up for labs before the end of the year.  We also started him on Crestor so will reassess his lipids, LDL was not below 100    ER 7/7  Abel Morris is a 59 y.o. male, with multiple PMHx including hypertension, T2DM (uncontrolled), HDL lipoprotein deficiency, hyperlipidemia, hypokalemia, DDD, and arthritis, who presents to the ED with lower back pain x4 days. Patient reports associated symptoms of bilateral leg swelling. Notes that he does heavy lifting for work. He states that the pain worsens with movements and alleviates when he's doing nothing. Patient reports treating his diabetes with "natural supplements." States that he "don't like the medical route, since it causes issues in the long run." Reports that his blood glucose level when his symptoms started was around 427, but states that it is around 191 at this time when he checked this morning. Endorses attempted treatment for his symptoms " yesterday morning with Tylenol and Ibuprofen with mild relief. Denies any past history of back pain. Denies any recent injury/ fall/ trauma. Denies urinary/ BM concerns, numbness, abdominal pain, nausea, vomiting, fever, rash, calf pain, SOB, or other associated symptoms.   presents to the ED with lower back pain x4 days.. The patient was seen and examined. The history and physical exam was obtained. The nursing notes and vital signs were reviewed. Secondary to symptoms and examination findings, I ordered labs, imaging, and EKG. Will treat with ketorolac injection 15 mg, orphenadrine injection 30 mg, and hydrocodone acetaminophen 5-325 mg.           Last PSA 7/24    He was on Pravachol but just out for no particular reason.  We started him on Crestor 10 mg which will be more potent.  Discussed the benefits of statin therapy.  As above he self discontinued and will restart    When Patient seen 4/23 in preop consultation today from Neurosurgery Dr. Good.  Apparently has cervical disc disease causing weakness and atrophy of the intrinsic muscles in both hands.  He definitely has weakness in the left arm although he did not notice it until he was tested by Neurosurgery.  He does have radicular symptoms down the left arm as well.  Neurosurgery notes reviewed.  Apparently saw pain management had an injection and physical therapy and the nerve impingement improved so apparently this was all cervical spondylosis and radiculopathy not any other neurological problem.  He does still have intrinsic wasting and intrinsic weakness but not getting any thing progressively worse.    Pain mgmt  DISCUSSION: Mr. Morris comes to me as part of [facility]. He was previously worked up by Dr. Mary for cervical radiculopathy. MRI shows a C6/7 foraminal protrusion with possible impingement on the left C7 nerve root. He reports not being able to carry heavy things with that arm, but there is no obvious unilateral  weakness on exam. He does have interosseous weakness bilaterally. Exam does indicate pain with ROM of the neck and reproduction of symptoms when he leans his head left but does not explain his interossei weakness. EMG was not diagnostic for motor neuron disease. He is 50% improved after JUAN PABLO.    PLAN:  Continue physical therapy with emphasis on establishing home exercise program.   Discontinue Lyrica since patient has not been using it. Can resume Lyrica 50mg QHS in the future if patient has return of radicular symptoms.   Discontinue Celecoxib because patient has discontinued use.    RTC as needed.    .  He had a pulmonary embolus related to varicose veins that have been treated apparently in the interval.  He completed a three-month course of anticoagulation.  It is clear for him to hold aspirin.         Reviewed prior 2021 hospitalization.  He was cutting his grass and acute tightness in the chest and shortness of breath.  He was diagnosed with a pulmonary embolus.  His ultrasound was negative except for the right superficial clot that has been there.  We discussed the high probability this could occur again since the night is for the pulmonary embolus presumably is the chronic superficial thrombosis and that still being present would likely indicated increased risk for PE.  He does have a follow-up with vascular scheduled early January and advised him of that not will likely be our 1st time we can assess the need for ongoing anticoagulation or least time we could interrupt anticoagulation to have a vascular procedure.  We discussed not using aspirin or any anti-inflammatories along with the blood thinner and to watch for any signs of bleeding in the stool, urine and so forth.  We discussed being careful around power tools and other such activities.  He is feeling fine with no chest pains or shortness of breath no lower extremity swelling.  He denied any trauma to the lower extremities or any sedentary activity  or long car rides and so forth prior to the PE  * Acute pulmonary embolism witho right heart strain  -Mr. Morris was admitted to inpatient status  -CTA Chest showed extensive pulmonary thromboemboli involving bilateral upper and lower lobar and segmental arteries.  No evidence of central saddle embolus.  Bowing of the interventricular septum likely reflecting right heart strain.  -Troponin elevated at 0.11  -Echo showed normal EF  -Doppler US showed a chronic non-occlusive DVT in R greater Saphenous vein  -No indication for tPA or thrombectomy  -PESI 84 consistend with 1.7 to 3.5% mortality  -Initially treated with full dose lovenox and transitioned to eliquis  -Will d/c home today on eliquis for at least 3 months.  Will refer to hematology oncology for evaluation.     Vascular treated varicose veins     He is poorly compliant with diabetic follow-up.  He said a message that he was developing bilateral leg weakness and some other unspecified symptoms we communicated back and forth over the computer.  Even the possibility of a spinal problem he was referred to Orthopedics in the interval.  He did have an interval appointment with me that apparently he reschedule due to the weather.  Orthopedic note reviewed and was felt to be secondary to diabetic neuropathy.      Reviewed neurology assessment.  Agree with seeing vascular.  He does have a swollen right lower extremity which is more swollen over the years and he has had varicose veins.  He clearly has venous insufficiency worse on the right than the left and some of this could be contributing to his pain.  He has had superficial blood clots which need to be evaluated any may not have had an ultrasound of the left leg yet.  We discussed stockings in the future and that some of this could easily be treated with laser.    Impression:  No evidence of DVT in the right lower extremity.  Chronic thrombus within the mid to lower greater saphenous vein.  Probable chronically  thrombosed varicose veins located laterally at the level of the knee and not significantly changed from prior ultrasound dated 01/10/2016.  Electronically signed by resident: Malcolm Perez  Date:                                            02/09/2021    Discussed it diabetic neuropathy.  He is taking 300 gabapentin 3 times per day.  He had Cymbalta at any says it makes him sleepy but advised him I think it might be the gabapentin more so but he can try taking the Cymbalta at night and I would agree with the Cymbalta and titration.  Gabapentin should also be increased and he can start with the nighttime dose going up to a 900 her 1200 mg if necessary.  He will call me and I will refill the prescription.  Lyrica may cause too much fluid retention and I would hold off on switching him to that until he gets his venous insufficiency changes addressed     NEURO -NM neuro in [place]  Assessment and Plan:  Abel Morris is a 53 y.o., man with peripheral polyneuropathy as a consequence of his diabetes and history of heavy alcohol intake.  His elevated protein on CSF is consistent with his longstanding, poorly controlled diabetes.  Will add other labs given his history to exclude any other exacerbating factors.     At this point, the best measure to prevent worsening is to limit use of alcohol and get blood sugar under control.   Advise an exercise program with balance and strengthening.   Advise neuropathic pain control as needed.  Gabapentin can be further maximized up to 1200mg TID as long as his kidney function tolerates.  Options include Lyrica, Trileptal, Amitriptyline  Also advise adding alpha lipoic acid 600mg daily for diabetic neuropathy management.  Advise seeing podiatry regularly for foot care in the setting of diabetes.  Advise seeing ophthalmology yearly for eye care in the setting of diabetes.   Not sure if he should see a peripheral vascular specialist.  Will bring this up to his PCP and defer  "referral to them so he can see someone more local.    Referral to pain management for painful diabetic neuropathy.      Seen Ortho:  HPI: Abel Morris is a 53 y.o. male who presents today complaining of bilateral leg pain     Duration of symptoms:  6 months  Trauma or new activity: no  Pain is constant  Has a very difficult time relaying his symptoms and responds that he is "unsure" or "it is hard to describe" in response to many questions  Occasional back pain   No numbness or paresthesias  Symptoms are not consistent with radiculopathy or myelopathy  Legs "feel tight" - describes this sensation as the feeling of a vacuum sealed back   Feels that is difficult to walk after he has been on his feet for a while -- feels uncoordinated, weak, painful   Pain localized from mid shin (anterior and posterior), radiates down to the plantar aspect of bilateral feet.     Vascular 9/21  symptomatic right lower extremity varicose veins despite medical therapy including compression s/p stab phlebectomy  -recommend cont compression with Rx stockings, elevation, dietary changes associated with water and sodium intake discussed at length with patient  -Exercise   -Recommend warm compresses, NSAID and elevation for thrombophlebitis prn  -RTC 6 mo    U/s -5/2021:  R superficial and deep venous reflux, no DVT, R GSV prox chronic thrombus, L deep and superficial vein reflux, L anterior accessory not visible.    ROS:   CONST: weight stable. EYES: no vision change. ENT: no sore throat. CV: no chest pain w/ exertion. RESP: no shortness of breath. GI: no nausea, vomiting, diarrhea. No dysphagia. : no urinary issues. MUSCULOSKELETAL: no new myalgias or arthralgias. SKIN: no new changes. NEURO: no focal deficits. PSYCH: no new issues. ENDOCRINE: no polyuria. HEME: no lymph nodes. ALLERGY: no general pruritis.      PAST MEDICAL HISTORY:                                                        1.  Diabetes.    Uncontrolled  , Possible " "neuropathy                                                          2.  Hyperlipidemia.  Lopid added   , Pravachol added                                                       3.  Low HDL.    4.  Erectile dysfunction   5.  Poor compliance   6.  epicondylitis     7.  Hoarseness -seen by ENT- did inhaler     8.  Superficial thrombosis and varicose veins right lower leg, seeing vascular       9.  MULTIPLE and large polyps  -ok  then  at [facility]-5 yrs    10.  PE         11.  peripheral polyneuropathy as a consequence of his diabetes and history of heavy alcohol intake                                                                                                                       PAST SURGICAL HISTORY:  Vein treatment                                                                                                                             FAMILY HISTORY:  Father doing well with history of colon polyps.  Mom is      with history of breast cancer.  Three brothers and two sisters      with no known medical problems.                                                                                                                           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  Quit smoking in  and only had a brief      period of smoking.  He reports "moderate" amount of alcohol with no          frequency stated.     Few beers now and then     Gen: no distress  Reproducible pain in the right lower paraspinal, mid paraspinal.  Spine nontender.      Labs reviewed, x-ray reports reviewed,    Diagnoses and all orders for this visit:    Uncontrolled type 2 diabetes mellitus with hyperglycemia, presumably uncontrolled, he reports normal glucose readings when on his supplements and I presume you might have been on a good diet but admittedly got off the diet for a one week vacation and sugars he says were in the 400 range.  That " may affect his A1c.  He was off meds for about two months.  Will recheck but presumably restart metformin and some dose of glimepiride either once or twice a day depending in it is okay for him to continue supplement we discussed that is supplements were not to replace medications as they might be helpful but not that helpful to get his uncontrolled diabetes back to normal.  -     Hemoglobin A1C; Future  -     Lipid Panel; Future    Lumbar strain, initial encounter, explained pathophysiology and this is muscular and will need to heal on its own    Poor compliance, patient self discontinued all his medications without medical recommendation and he has not been good about follow up every three-month    Hyperlipidemia, unspecified hyperlipidemia type, restart Crestor  -     Lipid Panel; Future    History of colonic polyps, reviewed interval report and looks like he is up-to-date with scope 9/24, five years    Venous insufficiency, chronic and stable, no edema today    Primary hypertension, apparently uncontrolled and was put back on his losartan or given losartan by the ER    Diabetic polyneuropathy associated with type 2 diabetes mellitus, will not improve if diabetes is not under control    Other orders  -     rosuvastatin (CRESTOR) 10 MG tablet; Take 1 tablet (10 mg total) by mouth once daily.  -     glimepiride (AMARYL) 4 MG tablet; Take 1 tablet (4 mg total) by mouth before breakfast.  -     losartan (COZAAR) 50 MG tablet; Take 1 tablet (50 mg total) by mouth once daily.         This is a patient with chronic and complex diagnoses whose overall, ongoing care is being managed and monitored by me and our primary care clinic. As such,   is the appropriate add-on code to accompany the other E/M billing for this visit.